# Patient Record
Sex: FEMALE | Race: ASIAN | NOT HISPANIC OR LATINO | Employment: FULL TIME | URBAN - METROPOLITAN AREA
[De-identification: names, ages, dates, MRNs, and addresses within clinical notes are randomized per-mention and may not be internally consistent; named-entity substitution may affect disease eponyms.]

---

## 2017-03-24 ENCOUNTER — HOSPITAL ENCOUNTER (OUTPATIENT)
Facility: HOSPITAL | Age: 62
Setting detail: HOSPITAL OUTPATIENT SURGERY
Discharge: HOME | End: 2017-03-24
Attending: INTERNAL MEDICINE | Admitting: INTERNAL MEDICINE

## 2017-03-24 ENCOUNTER — ANESTHESIA EVENT (OUTPATIENT)
Dept: GASTROENTEROLOGY | Facility: HOSPITAL | Age: 62
End: 2017-03-24

## 2017-03-24 ENCOUNTER — ON CAMPUS - OUTPATIENT (OUTPATIENT)
Dept: URBAN - METROPOLITAN AREA HOSPITAL 114 | Facility: HOSPITAL | Age: 62
End: 2017-03-24
Payer: COMMERCIAL

## 2017-03-24 ENCOUNTER — ANESTHESIA (OUTPATIENT)
Dept: GASTROENTEROLOGY | Facility: HOSPITAL | Age: 62
End: 2017-03-24

## 2017-03-24 VITALS
DIASTOLIC BLOOD PRESSURE: 83 MMHG | SYSTOLIC BLOOD PRESSURE: 121 MMHG | TEMPERATURE: 98.2 F | HEIGHT: 64 IN | HEART RATE: 85 BPM | WEIGHT: 133.44 LBS | RESPIRATION RATE: 12 BRPM | OXYGEN SATURATION: 100 % | BODY MASS INDEX: 22.78 KG/M2

## 2017-03-24 DIAGNOSIS — K21.9 GERD (GASTROESOPHAGEAL REFLUX DISEASE): ICD-10-CM

## 2017-03-24 DIAGNOSIS — K64.0 FIRST DEGREE HEMORRHOIDS: ICD-10-CM

## 2017-03-24 DIAGNOSIS — Z86.010 PERSONAL HISTORY OF COLONIC POLYPS: ICD-10-CM

## 2017-03-24 DIAGNOSIS — K31.7 POLYP OF STOMACH AND DUODENUM: ICD-10-CM

## 2017-03-24 DIAGNOSIS — Z98.890 OTHER SPECIFIED POSTPROCEDURAL STATES: ICD-10-CM

## 2017-03-24 DIAGNOSIS — K29.50 UNSPECIFIED CHRONIC GASTRITIS WITHOUT BLEEDING: ICD-10-CM

## 2017-03-24 PROCEDURE — 45378 DIAGNOSTIC COLONOSCOPY: CPT | Mod: 33 | Performed by: INTERNAL MEDICINE

## 2017-03-24 PROCEDURE — 43239 EGD BIOPSY SINGLE/MULTIPLE: CPT | Performed by: INTERNAL MEDICINE

## 2017-03-24 PROCEDURE — 25010000002 PROPOFOL 10 MG/ML EMULSION: Performed by: ANESTHESIOLOGY

## 2017-03-24 PROCEDURE — 88312 SPECIAL STAINS GROUP 1: CPT | Performed by: INTERNAL MEDICINE

## 2017-03-24 PROCEDURE — 88305 TISSUE EXAM BY PATHOLOGIST: CPT | Performed by: INTERNAL MEDICINE

## 2017-03-24 RX ORDER — SODIUM CHLORIDE, SODIUM LACTATE, POTASSIUM CHLORIDE, CALCIUM CHLORIDE 600; 310; 30; 20 MG/100ML; MG/100ML; MG/100ML; MG/100ML
30 INJECTION, SOLUTION INTRAVENOUS CONTINUOUS PRN
Status: DISCONTINUED | OUTPATIENT
Start: 2017-03-24 | End: 2017-03-24 | Stop reason: HOSPADM

## 2017-03-24 RX ORDER — ASPIRIN 81 MG/1
81 TABLET ORAL DAILY
COMMUNITY
End: 2018-08-02 | Stop reason: HOSPADM

## 2017-03-24 RX ORDER — OMEPRAZOLE/SODIUM BICARBONATE 40; 1680 MG/1; MG/1
1 POWDER, FOR SUSPENSION ORAL
COMMUNITY
End: 2018-07-20

## 2017-03-24 RX ORDER — LIDOCAINE HYDROCHLORIDE 20 MG/ML
INJECTION, SOLUTION INFILTRATION; PERINEURAL AS NEEDED
Status: DISCONTINUED | OUTPATIENT
Start: 2017-03-24 | End: 2017-03-24 | Stop reason: SURG

## 2017-03-24 RX ORDER — PROPOFOL 10 MG/ML
VIAL (ML) INTRAVENOUS CONTINUOUS PRN
Status: DISCONTINUED | OUTPATIENT
Start: 2017-03-24 | End: 2017-03-24 | Stop reason: SURG

## 2017-03-24 RX ORDER — PROPOFOL 10 MG/ML
VIAL (ML) INTRAVENOUS AS NEEDED
Status: DISCONTINUED | OUTPATIENT
Start: 2017-03-24 | End: 2017-03-24 | Stop reason: SURG

## 2017-03-24 RX ORDER — ESTRADIOL 0.5 MG/1
0.5 TABLET ORAL DAILY
COMMUNITY
End: 2018-07-20

## 2017-03-24 RX ORDER — FEXOFENADINE HCL AND PSEUDOEPHEDRINE HCI 180; 240 MG/1; MG/1
1 TABLET, EXTENDED RELEASE ORAL DAILY
COMMUNITY
End: 2021-03-26 | Stop reason: ALTCHOICE

## 2017-03-24 RX ADMIN — SODIUM CHLORIDE, POTASSIUM CHLORIDE, SODIUM LACTATE AND CALCIUM CHLORIDE 30 ML/HR: 600; 310; 30; 20 INJECTION, SOLUTION INTRAVENOUS at 08:02

## 2017-03-24 RX ADMIN — PROPOFOL 50 MG: 10 INJECTION, EMULSION INTRAVENOUS at 08:46

## 2017-03-24 RX ADMIN — LIDOCAINE HYDROCHLORIDE 40 MG: 20 INJECTION, SOLUTION INFILTRATION; PERINEURAL at 08:41

## 2017-03-24 RX ADMIN — PROPOFOL 150 MG: 10 INJECTION, EMULSION INTRAVENOUS at 08:41

## 2017-03-24 RX ADMIN — PROPOFOL 50 MG: 10 INJECTION, EMULSION INTRAVENOUS at 09:00

## 2017-03-24 RX ADMIN — PROPOFOL 300 MCG/KG/MIN: 10 INJECTION, EMULSION INTRAVENOUS at 08:42

## 2017-03-24 RX ADMIN — SODIUM CHLORIDE, POTASSIUM CHLORIDE, SODIUM LACTATE AND CALCIUM CHLORIDE: 600; 310; 30; 20 INJECTION, SOLUTION INTRAVENOUS at 08:37

## 2017-03-24 NOTE — ANESTHESIA POSTPROCEDURE EVALUATION
Patient: Carly Mendoza    Procedure Summary     Date Anesthesia Start Anesthesia Stop Room / Location    03/24/17 0837 0911  [facility] /  [facility]       Procedure Diagnosis Surgeon Provider    COLONOSCOPY TO CECUM AND TI (N/A ); ESOPHAGOGASTRODUODENOSCOPY (N/A Esophagus) No diagnosis on file. MD Floyd Williamson MD          Anesthesia Type: MAC  Last vitals  /79 (03/24/17 0922)    Temp      Pulse 80 (03/24/17 0922)   Resp 12 (03/24/17 0922)    SpO2 100 % (03/24/17 0922)      Post Anesthesia Care and Evaluation    Patient location during evaluation: bedside  Patient participation: complete - patient participated  Level of consciousness: awake and alert  Pain management: adequate  Airway patency: patent  Anesthetic complications: No anesthetic complications    Cardiovascular status: acceptable  Respiratory status: acceptable  Hydration status: acceptable

## 2017-03-24 NOTE — ANESTHESIA PREPROCEDURE EVALUATION
Anesthesia Evaluation     Patient summary reviewed and Nursing notes reviewed      Airway   Mallampati: II  TM distance: >3 FB  Neck ROM: full  no difficulty expected  Dental - normal exam     Pulmonary - negative pulmonary ROS and normal exam   Cardiovascular - negative cardio ROS and normal exam        Neuro/Psych- negative ROS  GI/Hepatic/Renal/Endo    (+)  GERD,     Musculoskeletal (-) negative ROS    Abdominal  - normal exam   Substance History - negative use     OB/GYN negative ob/gyn ROS         Other                                    Anesthesia Plan    ASA 2     MAC     intravenous induction   Anesthetic plan and risks discussed with patient.

## 2017-03-24 NOTE — H&P
Patient Care Team:  No Known Provider as PCP - General    Chief complaint gerd, hx of polyps    Subjective     History of Present Illness  Hx of gerd, requiring zegerid despite fundoplication   Review of Systems   All other systems reviewed and are negative.       Past Medical History:   Diagnosis Date   • Environmental allergies    • GERD (gastroesophageal reflux disease)      Past Surgical History:   Procedure Laterality Date   • HYSTERECTOMY       Family History   Problem Relation Age of Onset   • Colon cancer Mother      Social History   Substance Use Topics   • Smoking status: Never Smoker   • Smokeless tobacco: None   • Alcohol use Yes      Comment: social     Prescriptions Prior to Admission   Medication Sig Dispense Refill Last Dose   • aspirin 81 MG EC tablet Take 81 mg by mouth Daily.   3/17/2017   • estradiol (ESTRACE) 0.5 MG tablet Take 0.5 mg by mouth Daily.   3/23/2017 at Unknown time   • fexofenadine-pseudoephedrine (ALLEGRA-D 24) 180-240 MG per 24 hr tablet Take 1 tablet by mouth Daily.   3/24/2017 at Unknown time   • omeprazole-sodium bicarbonate (ZEGERID)  MG pack packet Take  by mouth Every Morning Before Breakfast.   Past Week at Unknown time     Allergies:  Latex and Moxifloxacin    Objective      Vital Signs  Temp:  [98.2 °F (36.8 °C)] 98.2 °F (36.8 °C)  Heart Rate:  [86] 86  Resp:  [20] 20  BP: (117)/(82) 117/82    Physical Exam   Constitutional: She is oriented to person, place, and time. She appears well-developed and well-nourished.   HENT:   Mouth/Throat: Oropharynx is clear and moist.   Eyes: Conjunctivae are normal.   Neck: Neck supple.   Cardiovascular: Normal rate and regular rhythm.    Pulmonary/Chest: Effort normal and breath sounds normal.   Abdominal: Soft. Bowel sounds are normal.   Neurological: She is alert and oriented to person, place, and time.   Skin: Skin is warm and dry.   Psychiatric: She has a normal mood and affect.       Results Review:   None       Assessment/Plan     Active Problems:    * No active hospital problems. *      Assessment:  (Gerd  Hx of polyps).     Plan:   (Upper and lower tract endoscopy, risks, alternatives and benefits dicussed  with patient and patient is agreeable to proceed.).       I discussed the patients findings and my recommendations with patient and nursing staff    Aleksander Bella MD  03/24/17  8:43 AM

## 2017-03-24 NOTE — PLAN OF CARE
Problem: Patient Care Overview (Adult)  Goal: Plan of Care Review  Outcome: Ongoing (interventions implemented as appropriate)    03/24/17 0736   Coping/Psychosocial Response Interventions   Plan Of Care Reviewed With patient       Goal: Adult Individualization and Mutuality  Outcome: Ongoing (interventions implemented as appropriate)    03/24/17 0736   Individualization   Patient Specific Preferences none       Goal: Discharge Needs Assessment  Outcome: Ongoing (interventions implemented as appropriate)    03/24/17 0736   Discharge Needs Assessment   Concerns To Be Addressed no discharge needs identified   Living Environment   Transportation Available family or friend will provide         Problem: GI Endoscopy (Adult)  Goal: Signs and Symptoms of Listed Potential Problems Will be Absent or Manageable (GI Endoscopy)  Outcome: Ongoing (interventions implemented as appropriate)    03/24/17 0736   GI Endoscopy   Problems Assessed (GI Endoscopy) pain   Problems Present (GI Endoscopy) none

## 2017-03-27 LAB
CYTO UR: NORMAL
LAB AP CASE REPORT: NORMAL
Lab: NORMAL
PATH REPORT.FINAL DX SPEC: NORMAL
PATH REPORT.GROSS SPEC: NORMAL

## 2018-05-30 ENCOUNTER — TRANSCRIBE ORDERS (OUTPATIENT)
Dept: ADMINISTRATIVE | Facility: HOSPITAL | Age: 63
End: 2018-05-30

## 2018-05-30 DIAGNOSIS — N63.10 BREAST MASS, RIGHT: Primary | ICD-10-CM

## 2018-06-01 ENCOUNTER — HOSPITAL ENCOUNTER (OUTPATIENT)
Dept: MAMMOGRAPHY | Facility: HOSPITAL | Age: 63
Discharge: HOME | End: 2018-06-01
Attending: OBSTETRICS & GYNECOLOGY | Admitting: OBSTETRICS & GYNECOLOGY

## 2018-06-01 ENCOUNTER — HOSPITAL ENCOUNTER (OUTPATIENT)
Dept: ULTRASOUND IMAGING | Facility: HOSPITAL | Age: 63
Discharge: HOME | End: 2018-06-01

## 2018-06-01 DIAGNOSIS — N63.10 BREAST MASS, RIGHT: ICD-10-CM

## 2018-06-01 PROCEDURE — 77066 DX MAMMO INCL CAD BI: CPT

## 2018-06-01 PROCEDURE — 76642 ULTRASOUND BREAST LIMITED: CPT

## 2018-06-15 ENCOUNTER — HOSPITAL ENCOUNTER (OUTPATIENT)
Dept: MAMMOGRAPHY | Facility: HOSPITAL | Age: 63
Discharge: HOME | End: 2018-06-15

## 2018-06-15 ENCOUNTER — HOSPITAL ENCOUNTER (OUTPATIENT)
Dept: ULTRASOUND IMAGING | Facility: HOSPITAL | Age: 63
Discharge: HOME | End: 2018-06-15
Attending: OBSTETRICS & GYNECOLOGY | Admitting: OBSTETRICS & GYNECOLOGY

## 2018-06-15 VITALS
HEART RATE: 88 BPM | BODY MASS INDEX: 23.9 KG/M2 | WEIGHT: 140 LBS | SYSTOLIC BLOOD PRESSURE: 124 MMHG | HEIGHT: 64 IN | RESPIRATION RATE: 18 BRPM | TEMPERATURE: 97.2 F | DIASTOLIC BLOOD PRESSURE: 84 MMHG

## 2018-06-15 DIAGNOSIS — IMO0002 MASS: ICD-10-CM

## 2018-06-15 PROCEDURE — 77065 DX MAMMO INCL CAD UNI: CPT

## 2018-06-15 PROCEDURE — A4648 IMPLANTABLE TISSUE MARKER: HCPCS

## 2018-06-15 PROCEDURE — 88341 IMHCHEM/IMCYTCHM EA ADD ANTB: CPT | Performed by: OBSTETRICS & GYNECOLOGY

## 2018-06-15 PROCEDURE — 88342 IMHCHEM/IMCYTCHM 1ST ANTB: CPT | Performed by: OBSTETRICS & GYNECOLOGY

## 2018-06-15 PROCEDURE — 88305 TISSUE EXAM BY PATHOLOGIST: CPT | Performed by: OBSTETRICS & GYNECOLOGY

## 2018-06-15 PROCEDURE — 88360 TUMOR IMMUNOHISTOCHEM/MANUAL: CPT | Performed by: OBSTETRICS & GYNECOLOGY

## 2018-06-15 RX ORDER — LIDOCAINE HYDROCHLORIDE 10 MG/ML
20 INJECTION, SOLUTION INFILTRATION; PERINEURAL ONCE
Status: COMPLETED | OUTPATIENT
Start: 2018-06-15 | End: 2018-06-15

## 2018-06-15 RX ADMIN — LIDOCAINE HYDROCHLORIDE 10 ML: 10 INJECTION, SOLUTION INFILTRATION; PERINEURAL at 11:35

## 2018-06-15 RX ADMIN — LIDOCAINE HYDROCHLORIDE 17 ML: 10 INJECTION, SOLUTION INFILTRATION; PERINEURAL at 10:42

## 2018-06-15 NOTE — NURSING NOTE
Patient with biopsy x2 to right breast. Firm pressure was help x20 minutes by two ultrasound techs. Hematoma 2.5 inches x 2.5 inches present at site when this nurse was called to ultrasound. Discussed with Dr. Arora that site still oozing. Ten minutes of additional pressure help to site and then applied D-stat per Dr. Arora's order. Hematoma decreased in size to 1.5 x 1.5 inches after additional pressure. Dr. Arora then proceeded on to biopsy additional site in right breast. No issues with bleeding with second site. D-stat remains in place to first biopsy site with skin affix dry and intact to more lateral second site. More medial site did have some dark pink bruising right at needle entry site. No bruising noted at other site. Post mammo. Images completed. Ice pack with protective covering applied over both sites. Patient reported she does find the area tender but not painful.  Explained to patient that I needed to apply a wide Ace pressure wrap in lieu of bra since she has a hematoma. 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 Discharge instructions discussed with understanding voiced by patient. Copies provided to patient. No distress noted. To home via private vehicle accompanied by her .

## 2018-06-25 LAB
CYTO UR: NORMAL
DX PRELIMINARY: NORMAL
LAB AP CASE REPORT: NORMAL
LAB AP CLINICAL INFORMATION: NORMAL
LAB AP INTRADEPARTMENTAL CONSULT: NORMAL
LAB AP SPECIAL STAINS: NORMAL
LAB AP SYNOPTIC CHECKLIST: NORMAL
PATH REPORT.ADDENDUM SPEC: NORMAL
PATH REPORT.FINAL DX SPEC: NORMAL
PATH REPORT.GROSS SPEC: NORMAL

## 2018-06-28 ENCOUNTER — OFFICE VISIT (OUTPATIENT)
Dept: MAMMOGRAPHY | Facility: CLINIC | Age: 63
End: 2018-06-28

## 2018-06-28 ENCOUNTER — TELEPHONE (OUTPATIENT)
Dept: MAMMOGRAPHY | Facility: CLINIC | Age: 63
End: 2018-06-28

## 2018-06-28 ENCOUNTER — DOCUMENTATION (OUTPATIENT)
Dept: OTHER | Facility: HOSPITAL | Age: 63
End: 2018-06-28

## 2018-06-28 VITALS
OXYGEN SATURATION: 95 % | HEIGHT: 64 IN | DIASTOLIC BLOOD PRESSURE: 75 MMHG | HEART RATE: 107 BPM | WEIGHT: 144 LBS | SYSTOLIC BLOOD PRESSURE: 118 MMHG | TEMPERATURE: 97.6 F | BODY MASS INDEX: 24.59 KG/M2

## 2018-06-28 DIAGNOSIS — C50.911 INVASIVE DUCTAL CARCINOMA OF RIGHT BREAST (HCC): Primary | ICD-10-CM

## 2018-06-28 DIAGNOSIS — Z17.0 MALIGNANT NEOPLASM OF OVERLAPPING SITES OF RIGHT BREAST IN FEMALE, ESTROGEN RECEPTOR POSITIVE (HCC): Primary | ICD-10-CM

## 2018-06-28 DIAGNOSIS — C50.811 MALIGNANT NEOPLASM OF OVERLAPPING SITES OF RIGHT BREAST IN FEMALE, ESTROGEN RECEPTOR POSITIVE (HCC): Primary | ICD-10-CM

## 2018-06-28 PROCEDURE — 99205 OFFICE O/P NEW HI 60 MIN: CPT | Performed by: SURGERY

## 2018-06-28 RX ORDER — TURMERIC ROOT EXTRACT 500 MG
1000 TABLET ORAL DAILY
COMMUNITY

## 2018-06-28 RX ORDER — CHLORTHALIDONE 25 MG/1
25 TABLET ORAL NIGHTLY
COMMUNITY
Start: 2018-06-05 | End: 2020-10-02 | Stop reason: SDUPTHER

## 2018-06-28 NOTE — PROGRESS NOTES
Met patient today with Dr. Gudino. He discussed genetic counseling/possible testing with patient and she is interested. I offered her a spot in our Tuesday morning clinic next week, but Tuesday's don't work with her schedule. We discussed that she could be seen at the [facility] and she prefers that and would like to see Dr. Wahl. Referral placed.

## 2018-06-28 NOTE — PROGRESS NOTES
Chief Complaint: Carly Mendoza is a 62 y.o.. female here today for Breast Cancer (right breast)        History of Present Illness:  Patient presents with newly diagnosed breast cancer.  The patient has never had a mammogram until her most recent one.  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  She felt a lump and promptly had mammograms and an ultrasound.  She was noted to have a 4.2 cm irregular mass in the 9:30 position of the right breast.  There were also some pleomorphic calcifications in the lower inner quadrant of the right breast.  The radiologist felt that there was an ultrasound correlate and recommended biopsy of this area as well.  The pathology reveals a grade 1 invasive ductal cancer at the 9:30 position.  There are also fragments of invasive ductal cancer at the 4 to 5 o'clock position.  Note was made that the cancer is probably in the lymphatic spaces.  The tumor is ER positive, WV positive, and HER-2 negative.  The patient underwent a total abdominal hysterectomy and BSO at age 33 and has been on hormone replacement therapy for the last 30 years.  She recently stopped taking them.  Her family history is significant for a mother with colon cancer, maternal aunt with breast cancer and a maternal aunt with uterine cancer.  The patient has never had any prior breast biopsies.    She denies any musculoskeletal pain, headaches, or balance problems.  Review of Systems:  Review of Systems   Constitutional: Positive for fatigue.   Endocrine: Positive for hot flashes.   Skin:        The patient denies any noticeable changes to the skin of the breast.    All other systems reviewed and are negative.       Past Medical and Surgical History:  Breast Biopsy History:  Patient has had the following breast biopsies:right breast 2018- malignant   Breast Cancer HIstory:  Patient does not have a past medical history of breast cancer.  Breast Operations, and  year:  none  History   Smoking Status   • Never Smoker   Smokeless Tobacco   • Never Used     Obstetric History:  Patient is postmenopausal due to removal of her uterus in the following year:1988   Number of pregnancies:0  Number of live births: 0  Number of abortions or miscarriages: 0  Length of time taking birth control pills:0  Patient is presently taking the following hormone replacement:Estradiol for past 30 years    Past Surgical History:   Procedure Laterality Date   • BREAST BIOPSY Right 2018    Malignant   • COLONOSCOPY N/A 3/24/2017    Procedure: COLONOSCOPY TO CECUM AND TI;  Surgeon: Aleksander Bella MD;  Location: [facility] ENDOSCOPY;  Service:    • ENDOSCOPY N/A 3/24/2017    Procedure: ESOPHAGOGASTRODUODENOSCOPY;  Surgeon: Aleksander Bella MD;  Location: [facility] ENDOSCOPY;  Service:    • HYSTERECTOMY     • OOPHORECTOMY         Past Medical History:   Diagnosis Date   • Environmental allergies    • GERD (gastroesophageal reflux disease)    • Hypertension        Prior Hospitalizations, other than for surgery or childbirth, and year:  none    Social History:  Patient is .  Patient has no children.    Family History:  Family History   Problem Relation Age of Onset   • Colon cancer Mother 73   • Breast cancer Maternal Aunt 60   • Uterine cancer Maternal Aunt 75       Vital Signs:  Vitals:    06/28/18 1406   BP: 118/75   Pulse: 107   Temp: 97.6 °F (36.4 °C)   SpO2: 95%       Medications:    Current Outpatient Prescriptions:     Current Outpatient Prescriptions:   •  aspirin 81 MG EC tablet, Take 81 mg by mouth Daily., Disp: , Rfl:   •  chlorthalidone (HYGROTON) 25 MG tablet, , Disp: , Rfl:   •  fexofenadine-pseudoephedrine (ALLEGRA-D 24) 180-240 MG per 24 hr tablet, Take 1 tablet by mouth Daily., Disp: , Rfl:   •  omeprazole-sodium bicarbonate (ZEGERID)  MG pack packet, Take  by mouth Every Morning Before Breakfast., Disp: , Rfl:   •  Turmeric 500 MG tablet, Take 1,000 mg by mouth., Disp: , Rfl:   •   estradiol (ESTRACE) 0.5 MG tablet, Take 0.5 mg by mouth Daily., Disp: , Rfl:     Physical Examination:  General Appearance:   Patient is in no distress.  She is well kept and has an average build.   Psychiatric:  Patient with appropriate mood and affect. Alert and oriented to self, time, and place.    Breast, RIGHT:  small sized, symmetric with the contralateral side.  there is an obvious palpable mass in the upper outer quadrant extending to just under the edge of the areola.  She has a large hematoma in the lower inner quadrant making evaluation difficult in this area.  I do not detect any nipple discharge or see any significant skin dimpling or retraction..    Breast, LEFT:  small sized, symmetric with the contralateral side.  Breast skin is without erythema, edema, rashes.  There are no visible abnormalities upon inspection during the arm-raising maneuver or with hands on hips in the sitting position. There is no nipple retraction, discharge or nipple/areolar skin changes.There are no masses palpable in the sitting or supine positions.    Lymphatic:  There is no axillary, cervical, infraclavicular, or supraclavicular adenopathy bilaterally.  Eyes:  Pupils are round and reactive to light.  Cardiovascular:  Heart rate and rhythm are regular.  Respiratory:  Lungs are clear bilaterally with no crackles or wheezes in any lung field.  Gastrointestinal:  Abdomen is soft, nondistended, and nontender.  I do not detect any abdominal masses or see evidence of hepatosplenomegaly.    Musculoskeletal:  Good strength in all 4 extremities.   There is good range of motion in both shoulders.    Skin:  No new skin lesions or rashes on the skin excluding the breast (see breast exam above).    Assessment:  1. Malignant neoplasm of overlapping sites of right breast in female, estrogen receptor positive          Plan:  The patient came by herself today.  The face-to-face office visit lasted 50 minutes.  We began the conversation  discussing her pathology report.  We talked about the origin of most of breast cancers from either the ducts or the lobules.  The difference between invasive and in situ disease was explained and the visual was drawn for her.  When invasion has occurred, the lymph nodes need to be evaluated.  When there is in situ disease the lymph nodes should be considered if the area of concern is widespread or it is high-grade.  We also discussed the significance of hormone receptors.  They often can give us some idea how the breast cancer will behave and potentially lead us to offer neoadjuvant chemotherapy.    Next we discussed the surgical options which include breast conserving therapy versus a mastectomy.  With breast conserving therapy we are talking about a lumpectomy with margins, lymph node evaluation, and radiation treatment.  The radiation treatment is generally given to the entire breast for 6 weeks.  The side effects consist of local skin change and potential injury to nearby structures such as the lung or the heart.  A mastectomy would involve removing the breast tissues with preservation of the pectoral muscles and evaluation of the lymph nodes if indicated.  The potential for reconstruction by either an implant or autologous tissue was discussed.  This could be performed on a delayed basis or immediately depending on a multitude of factors.  The survival rates for these 2 procedures are equivalent but there are incidences where one may be favored over the other.  There are times when a lumpectomy is not possible due to the large tumor to breast ratio or the location of the tumor.  Previous radiation to the chest wall or collagen disorders such as scleroderma would make radiation treatment and possible and therefore exclude breast conserving therapy as an option.    I do think an MRI would be helpful in her case.  She would also appeared to be a candidate for genetic testing which could potentially affect surgical  decision making.  The patient is very anxious to have surgery as she feels that this is potentially spreading.  She is not a candidate for lumpectomy and is quite comfortable with a mastectomy.  She would like to consider reconstruction and she has an appointment to see Dr. Waterhouse tomorrow.  I will call her back after the MRI.  The patient would probably prefer to have surgery rather than waiting on genetic testing and we will just see how all of that plays out.  CPT coding:    Next Appointment:  No Follow-up on file.            EMR Dragon/transcription disclaimer:    Much of this encounter note is an electronic transcription/translocation of spoken language to printed text.  The electronic translation of spoken language may permit erroneous, or at times, nonsensical words or phrases to be inadvertently transcribed.  Although I have reviewed the note from such areas, some may still exist.

## 2018-07-07 ENCOUNTER — HOSPITAL ENCOUNTER (OUTPATIENT)
Dept: MRI IMAGING | Facility: HOSPITAL | Age: 63
Discharge: HOME | End: 2018-07-07
Attending: SURGERY | Admitting: SURGERY

## 2018-07-07 DIAGNOSIS — C50.811 MALIGNANT NEOPLASM OF OVERLAPPING SITES OF RIGHT BREAST IN FEMALE, ESTROGEN RECEPTOR POSITIVE (HCC): ICD-10-CM

## 2018-07-07 DIAGNOSIS — Z17.0 MALIGNANT NEOPLASM OF OVERLAPPING SITES OF RIGHT BREAST IN FEMALE, ESTROGEN RECEPTOR POSITIVE (HCC): ICD-10-CM

## 2018-07-07 PROCEDURE — A9577 INJ MULTIHANCE: HCPCS | Performed by: SURGERY

## 2018-07-07 PROCEDURE — 0 GADOBENATE DIMEGLUMINE 529 MG/ML SOLUTION: Performed by: SURGERY

## 2018-07-07 PROCEDURE — 0159T HC MRI BREAST COMPUTER ANALYSIS: CPT

## 2018-07-07 PROCEDURE — 82565 ASSAY OF CREATININE: CPT

## 2018-07-07 PROCEDURE — C8908 MRI W/O FOL W/CONT, BREAST,: HCPCS

## 2018-07-07 RX ADMIN — GADOBENATE DIMEGLUMINE 13 ML: 529 INJECTION, SOLUTION INTRAVENOUS at 13:33

## 2018-07-08 LAB — CREAT BLDA-MCNC: 0.9 MG/DL (ref 0.6–1.3)

## 2018-07-10 ENCOUNTER — TELEPHONE (OUTPATIENT)
Dept: OTHER | Facility: HOSPITAL | Age: 63
End: 2018-07-10

## 2018-07-10 NOTE — TELEPHONE ENCOUNTER
I spoke with Carly today to see if she has had her appointment yet at the [facility]. She states she has not. I spoke with Bridgette at the [facility] and she will be calling the patient to schedule the appointment.

## 2018-07-11 ENCOUNTER — DOCUMENTATION (OUTPATIENT)
Dept: OTHER | Facility: HOSPITAL | Age: 63
End: 2018-07-11

## 2018-07-11 NOTE — PROGRESS NOTES
I received a message from Bridgette at the [facility] stating patient will be seen on 7-13-18 at 1:00.

## 2018-07-16 ENCOUNTER — TELEPHONE (OUTPATIENT)
Dept: MAMMOGRAPHY | Facility: CLINIC | Age: 63
End: 2018-07-16

## 2018-07-17 ENCOUNTER — TELEPHONE (OUTPATIENT)
Dept: MAMMOGRAPHY | Facility: CLINIC | Age: 63
End: 2018-07-17

## 2018-07-17 ENCOUNTER — PREP FOR SURGERY (OUTPATIENT)
Dept: OTHER | Facility: HOSPITAL | Age: 63
End: 2018-07-17

## 2018-07-17 DIAGNOSIS — Z17.0 MALIGNANT NEOPLASM OF OVERLAPPING SITES OF RIGHT BREAST IN FEMALE, ESTROGEN RECEPTOR POSITIVE (HCC): Primary | ICD-10-CM

## 2018-07-17 DIAGNOSIS — C50.811 MALIGNANT NEOPLASM OF OVERLAPPING SITES OF RIGHT BREAST IN FEMALE, ESTROGEN RECEPTOR POSITIVE (HCC): Primary | ICD-10-CM

## 2018-07-17 RX ORDER — LIDOCAINE AND PRILOCAINE 25; 25 MG/G; MG/G
CREAM TOPICAL ONCE
Status: CANCELLED | OUTPATIENT
Start: 2018-08-01 | End: 2018-08-01

## 2018-07-17 RX ORDER — DIAZEPAM 5 MG/1
10 TABLET ORAL ONCE
Status: CANCELLED | OUTPATIENT
Start: 2018-08-01 | End: 2018-08-01

## 2018-07-17 RX ORDER — ACETAMINOPHEN 500 MG
1000 TABLET ORAL ONCE
Status: CANCELLED | OUTPATIENT
Start: 2018-08-01 | End: 2018-08-01

## 2018-07-17 RX ORDER — CEFAZOLIN SODIUM 2 G/100ML
2 INJECTION, SOLUTION INTRAVENOUS ONCE
Status: CANCELLED | OUTPATIENT
Start: 2018-08-01 | End: 2018-08-01

## 2018-07-17 RX ORDER — CELECOXIB 200 MG/1
400 CAPSULE ORAL ONCE
Status: CANCELLED | OUTPATIENT
Start: 2018-08-01 | End: 2018-08-01

## 2018-07-17 NOTE — TELEPHONE ENCOUNTER
I told her the MRI shows extensive enhancement of almost the entire upper half of her breast.  She has some borderline enlarged lymph nodes but they all have normal architecture.  The other breast looks fine.  The patient however prefers bilateral mastectomies and I think that is reasonable considering the extent of her disease and difficulty in picking this up of the small size.

## 2018-07-18 DIAGNOSIS — C50.811 MALIGNANT NEOPLASM OF OVERLAPPING SITES OF RIGHT BREAST IN FEMALE, ESTROGEN RECEPTOR POSITIVE (HCC): Primary | ICD-10-CM

## 2018-07-18 DIAGNOSIS — Z17.0 MALIGNANT NEOPLASM OF OVERLAPPING SITES OF RIGHT BREAST IN FEMALE, ESTROGEN RECEPTOR POSITIVE (HCC): Primary | ICD-10-CM

## 2018-07-20 ENCOUNTER — APPOINTMENT (OUTPATIENT)
Dept: PREADMISSION TESTING | Facility: HOSPITAL | Age: 63
End: 2018-07-20

## 2018-07-20 VITALS
WEIGHT: 144.3 LBS | HEART RATE: 95 BPM | TEMPERATURE: 98.1 F | DIASTOLIC BLOOD PRESSURE: 84 MMHG | SYSTOLIC BLOOD PRESSURE: 120 MMHG | OXYGEN SATURATION: 98 % | BODY MASS INDEX: 24.63 KG/M2 | HEIGHT: 64 IN | RESPIRATION RATE: 16 BRPM

## 2018-07-20 LAB
ANION GAP SERPL CALCULATED.3IONS-SCNC: 15.7 MMOL/L
BUN BLD-MCNC: 20 MG/DL (ref 8–23)
BUN/CREAT SERPL: 19 (ref 7–25)
CALCIUM SPEC-SCNC: 9.4 MG/DL (ref 8.6–10.5)
CHLORIDE SERPL-SCNC: 96 MMOL/L (ref 98–107)
CO2 SERPL-SCNC: 29.3 MMOL/L (ref 22–29)
CREAT BLD-MCNC: 1.05 MG/DL (ref 0.57–1)
DEPRECATED RDW RBC AUTO: 43.8 FL (ref 37–54)
ERYTHROCYTE [DISTWIDTH] IN BLOOD BY AUTOMATED COUNT: 14.8 % (ref 11.7–13)
GFR SERPL CREATININE-BSD FRML MDRD: 53 ML/MIN/1.73
GFR SERPL CREATININE-BSD FRML MDRD: 65 ML/MIN/1.73
GLUCOSE BLD-MCNC: 90 MG/DL (ref 65–99)
HCT VFR BLD AUTO: 39.8 % (ref 35.6–45.5)
HGB BLD-MCNC: 12.5 G/DL (ref 11.9–15.5)
MCH RBC QN AUTO: 25.6 PG (ref 26.9–32)
MCHC RBC AUTO-ENTMCNC: 31.4 G/DL (ref 32.4–36.3)
MCV RBC AUTO: 81.6 FL (ref 80.5–98.2)
PLATELET # BLD AUTO: 365 10*3/MM3 (ref 140–500)
PMV BLD AUTO: 9.3 FL (ref 6–12)
POTASSIUM BLD-SCNC: 3.7 MMOL/L (ref 3.5–5.2)
RBC # BLD AUTO: 4.88 10*6/MM3 (ref 3.9–5.2)
SODIUM BLD-SCNC: 141 MMOL/L (ref 136–145)
WBC NRBC COR # BLD: 8.03 10*3/MM3 (ref 4.5–10.7)

## 2018-07-20 PROCEDURE — 80048 BASIC METABOLIC PNL TOTAL CA: CPT | Performed by: SURGERY

## 2018-07-20 PROCEDURE — 93010 ELECTROCARDIOGRAM REPORT: CPT | Performed by: INTERNAL MEDICINE

## 2018-07-20 PROCEDURE — 85027 COMPLETE CBC AUTOMATED: CPT | Performed by: SURGERY

## 2018-07-20 PROCEDURE — 36415 COLL VENOUS BLD VENIPUNCTURE: CPT

## 2018-07-20 PROCEDURE — 93005 ELECTROCARDIOGRAM TRACING: CPT

## 2018-07-20 RX ORDER — OMEPRAZOLE AND SODIUM BICARBONATE 40; 1100 MG/1; MG/1
1 CAPSULE ORAL NIGHTLY
COMMUNITY

## 2018-07-20 NOTE — DISCHARGE INSTRUCTIONS
Take the following medications the morning of surgery with a small sip of water:    ZEGERID    General Instructions:  • Do not eat solid food after midnight the night before surgery.  • You may drink clear liquids day of surgery but must stop at least one hour before your hospital arrival time.  • It is beneficial for you to have a clear drink that contains carbohydrates the day of surgery.  We suggest a 12 to 20 ounce bottle of Gatorade or Powerade for non-diabetic patients or a 12 to 20 ounce bottle of G2 or Powerade Zero for diabetic patients. (Pediatric patients, are not advised to drink a 12 to 20 ounce carbohydrate drink)    Clear liquids are liquids you can see through.  Nothing red in color.     Plain water                               Sports drinks  Sodas                                   Gelatin (Jell-O)  Fruit juices without pulp such as white grape juice and apple juice  Popsicles that contain no fruit or yogurt  Tea or coffee (no cream or milk added)  Gatorade / Powerade  G2 / Powerade Zero    • Infants may have breast milk up to four hours before surgery.  • Infants drinking formula may drink formula up to six hours before surgery.   • Patients who avoid smoking, chewing tobacco and alcohol for 4 weeks prior to surgery have a reduced risk of post-operative complications.  Quit smoking as many days before surgery as you can.  • Do not smoke, use chewing tobacco or drink alcohol the day of surgery.   • If applicable bring your C-PAP/ BI-PAP machine.  • Bring any papers given to you in the doctor’s office.  • Wear clean comfortable clothes and socks.  • Do not wear contact lenses or make-up.  Bring a case for your glasses.   • Bring crutches or walker if applicable.  • Remove all piercings.  Leave jewelry and any other valuables at home.  • Hair extensions with metal clips must be removed prior to surgery.  • The Pre-Admission Testing nurse will instruct you to bring medications if unable to obtain an  accurate list in Pre-Admission Testing.        If you were given a blood bank ID arm band remember to bring it with you the day of surgery.    Preventing a Surgical Site Infection:  • For 2 to 3 days before surgery, avoid shaving with a razor because the razor can irritate skin and make it easier to develop an infection.    • Any areas of open skin can increase the risk of a post-operative wound infection by allowing bacteria to enter and travel throughout the body.  Notify your surgeon if you have any skin wounds / rashes even if it is not near the expected surgical site.  The area will need assessed to determine if surgery should be delayed until it is healed.  • The night prior to surgery sleep in a clean bed with clean clothing.  Do not allow pets to sleep with you.  • Shower on the morning of surgery using a fresh bar of anti-bacterial soap (such as Dial) and clean washcloth.  Dry with a clean towel and dress in clean clothing.  • Ask your surgeon if you will be receiving antibiotics prior to surgery.  • Make sure you, your family, and all healthcare providers clean their hands with soap and water or an alcohol based hand  before caring for you or your wound.    Day of surgery:  Upon arrival, a Pre-op nurse and Anesthesiologist will review your health history, obtain vital signs, and answer questions you may have.  The only belongings needed at this time will be your home medications and if applicable your C-PAP/BI-PAP machine.  If you are staying overnight your family can leave the rest of your belongings in the car and bring them to your room later.  A Pre-op nurse will start an IV and you may receive medication in preparation for surgery, including something to help you relax.  Your family will be able to see you in the Pre-op area.  While you are in surgery your family should notify the waiting room  if they leave the waiting room area and provide a contact phone number.    Please be  aware that surgery does come with discomfort.  We want to make every effort to control your discomfort so please discuss any uncontrolled symptoms with your nurse.   Your doctor will most likely have prescribed pain medications.      If you are going home after surgery you will receive individualized written care instructions before being discharged.  A responsible adult must drive you to and from the hospital on the day of your surgery and stay with you for 24 hours.    If you are staying overnight following surgery, you will be transported to your hospital room following the recovery period.  [facility] has all private rooms.    You have received a list of surgical assistants for your reference.  If you have any questions please call Pre-Admission Testing at [phone].  Deductibles and co-payments are collected on the day of service. Please be prepared to pay the required co-pay, deductible or deposit on the day of service as defined by your plan.

## 2018-08-01 ENCOUNTER — HOSPITAL ENCOUNTER (OUTPATIENT)
Facility: HOSPITAL | Age: 63
Discharge: HOME | End: 2018-08-02
Attending: SURGERY | Admitting: SURGERY

## 2018-08-01 ENCOUNTER — HOSPITAL ENCOUNTER (OUTPATIENT)
Dept: NUCLEAR MEDICINE | Facility: HOSPITAL | Age: 63
Discharge: HOME | End: 2018-08-01
Attending: SURGERY

## 2018-08-01 ENCOUNTER — ANESTHESIA EVENT (OUTPATIENT)
Dept: PERIOP | Facility: HOSPITAL | Age: 63
End: 2018-08-01

## 2018-08-01 ENCOUNTER — ANESTHESIA (OUTPATIENT)
Dept: PERIOP | Facility: HOSPITAL | Age: 63
End: 2018-08-01

## 2018-08-01 DIAGNOSIS — Z17.0 MALIGNANT NEOPLASM OF OVERLAPPING SITES OF RIGHT BREAST IN FEMALE, ESTROGEN RECEPTOR POSITIVE (HCC): ICD-10-CM

## 2018-08-01 DIAGNOSIS — C50.811 MALIGNANT NEOPLASM OF OVERLAPPING SITES OF RIGHT BREAST IN FEMALE, ESTROGEN RECEPTOR POSITIVE (HCC): ICD-10-CM

## 2018-08-01 PROCEDURE — 19303 MAST SIMPLE COMPLETE: CPT | Performed by: SURGERY

## 2018-08-01 PROCEDURE — 25010000002 ONDANSETRON PER 1 MG: Performed by: NURSE ANESTHETIST, CERTIFIED REGISTERED

## 2018-08-01 PROCEDURE — A9541 TC99M SULFUR COLLOID: HCPCS | Performed by: SURGERY

## 2018-08-01 PROCEDURE — 38525 BIOPSY/REMOVAL LYMPH NODES: CPT | Performed by: SURGERY

## 2018-08-01 PROCEDURE — 25010000003 CEFAZOLIN 1-4 GM/50ML-% SOLUTION: Performed by: PLASTIC SURGERY

## 2018-08-01 PROCEDURE — 25010000002 PROPOFOL 10 MG/ML EMULSION: Performed by: NURSE ANESTHETIST, CERTIFIED REGISTERED

## 2018-08-01 PROCEDURE — 25010000002 FENTANYL CITRATE (PF) 100 MCG/2ML SOLUTION: Performed by: ANESTHESIOLOGY

## 2018-08-01 PROCEDURE — C1789 PROSTHESIS, BREAST, IMP: HCPCS | Performed by: SURGERY

## 2018-08-01 PROCEDURE — 0 TECHNETIUM FILTERED SULFUR COLLOID: Performed by: SURGERY

## 2018-08-01 PROCEDURE — 88307 TISSUE EXAM BY PATHOLOGIST: CPT | Performed by: SURGERY

## 2018-08-01 PROCEDURE — 25010000002 DEXAMETHASONE PER 1 MG: Performed by: NURSE ANESTHETIST, CERTIFIED REGISTERED

## 2018-08-01 PROCEDURE — 38792 RA TRACER ID OF SENTINL NODE: CPT

## 2018-08-01 PROCEDURE — 25010000002 MIDAZOLAM PER 1 MG: Performed by: ANESTHESIOLOGY

## 2018-08-01 PROCEDURE — 25010000002 HYDROMORPHONE PER 4 MG: Performed by: NURSE ANESTHETIST, CERTIFIED REGISTERED

## 2018-08-01 PROCEDURE — 88331 PATH CONSLTJ SURG 1 BLK 1SPC: CPT | Performed by: SURGERY

## 2018-08-01 PROCEDURE — 38900 IO MAP OF SENT LYMPH NODE: CPT | Performed by: SURGERY

## 2018-08-01 PROCEDURE — 25010000002 MORPHINE SULFATE (PF) 2 MG/ML SOLUTION: Performed by: PLASTIC SURGERY

## 2018-08-01 PROCEDURE — 25010000002 ONDANSETRON PER 1 MG: Performed by: PLASTIC SURGERY

## 2018-08-01 PROCEDURE — 88360 TUMOR IMMUNOHISTOCHEM/MANUAL: CPT | Performed by: SURGERY

## 2018-08-01 PROCEDURE — 25010000003 CEFAZOLIN IN DEXTROSE 2-4 GM/100ML-% SOLUTION: Performed by: SURGERY

## 2018-08-01 PROCEDURE — 25010000002 FENTANYL CITRATE (PF) 100 MCG/2ML SOLUTION: Performed by: NURSE ANESTHETIST, CERTIFIED REGISTERED

## 2018-08-01 PROCEDURE — 88305 TISSUE EXAM BY PATHOLOGIST: CPT | Performed by: SURGERY

## 2018-08-01 DEVICE — GRFT TISS ALLODERM RTM PERF THK 9.6X19.3CM: Type: IMPLANTABLE DEVICE | Site: BREAST | Status: FUNCTIONAL

## 2018-08-01 DEVICE — TEXTURED, HIGH PROFILE, SUTURE TABS, INTEGRAL INJECTION DOME, 475CC
Type: IMPLANTABLE DEVICE | Site: BREAST | Status: FUNCTIONAL
Brand: ARTOURA BREAST TISSUE EXPANDER

## 2018-08-01 RX ORDER — ROCURONIUM BROMIDE 10 MG/ML
INJECTION, SOLUTION INTRAVENOUS AS NEEDED
Status: DISCONTINUED | OUTPATIENT
Start: 2018-08-01 | End: 2018-08-01 | Stop reason: SURG

## 2018-08-01 RX ORDER — SODIUM CHLORIDE 0.9 % (FLUSH) 0.9 %
1-10 SYRINGE (ML) INJECTION AS NEEDED
Status: DISCONTINUED | OUTPATIENT
Start: 2018-08-01 | End: 2018-08-02 | Stop reason: HOSPADM

## 2018-08-01 RX ORDER — MAGNESIUM HYDROXIDE 1200 MG/15ML
LIQUID ORAL AS NEEDED
Status: DISCONTINUED | OUTPATIENT
Start: 2018-08-01 | End: 2018-08-01 | Stop reason: HOSPADM

## 2018-08-01 RX ORDER — SODIUM CHLORIDE, SODIUM LACTATE, POTASSIUM CHLORIDE, CALCIUM CHLORIDE 600; 310; 30; 20 MG/100ML; MG/100ML; MG/100ML; MG/100ML
INJECTION, SOLUTION INTRAVENOUS CONTINUOUS PRN
Status: DISCONTINUED | OUTPATIENT
Start: 2018-08-01 | End: 2018-08-01 | Stop reason: SURG

## 2018-08-01 RX ORDER — BUPIVACAINE HYDROCHLORIDE AND EPINEPHRINE 2.5; 5 MG/ML; UG/ML
INJECTION, SOLUTION INFILTRATION; PERINEURAL AS NEEDED
Status: DISCONTINUED | OUTPATIENT
Start: 2018-08-01 | End: 2018-08-01 | Stop reason: HOSPADM

## 2018-08-01 RX ORDER — ACETAMINOPHEN 325 MG/1
650 TABLET ORAL EVERY 4 HOURS PRN
Status: DISCONTINUED | OUTPATIENT
Start: 2018-08-01 | End: 2018-08-02 | Stop reason: HOSPADM

## 2018-08-01 RX ORDER — CELECOXIB 200 MG/1
400 CAPSULE ORAL ONCE
Status: DISCONTINUED | OUTPATIENT
Start: 2018-08-01 | End: 2018-08-01 | Stop reason: HOSPADM

## 2018-08-01 RX ORDER — LIDOCAINE HYDROCHLORIDE 20 MG/ML
INJECTION, SOLUTION INFILTRATION; PERINEURAL AS NEEDED
Status: DISCONTINUED | OUTPATIENT
Start: 2018-08-01 | End: 2018-08-01 | Stop reason: SURG

## 2018-08-01 RX ORDER — FAMOTIDINE 10 MG/ML
20 INJECTION, SOLUTION INTRAVENOUS ONCE
Status: COMPLETED | OUTPATIENT
Start: 2018-08-01 | End: 2018-08-01

## 2018-08-01 RX ORDER — ONDANSETRON 4 MG/1
4 TABLET, ORALLY DISINTEGRATING ORAL EVERY 6 HOURS PRN
Status: DISCONTINUED | OUTPATIENT
Start: 2018-08-01 | End: 2018-08-02 | Stop reason: HOSPADM

## 2018-08-01 RX ORDER — MIDAZOLAM HYDROCHLORIDE 1 MG/ML
1 INJECTION INTRAMUSCULAR; INTRAVENOUS
Status: DISCONTINUED | OUTPATIENT
Start: 2018-08-01 | End: 2018-08-01 | Stop reason: HOSPADM

## 2018-08-01 RX ORDER — DIAZEPAM 5 MG/1
10 TABLET ORAL ONCE
Status: COMPLETED | OUTPATIENT
Start: 2018-08-01 | End: 2018-08-01

## 2018-08-01 RX ORDER — SODIUM CHLORIDE, SODIUM LACTATE, POTASSIUM CHLORIDE, CALCIUM CHLORIDE 600; 310; 30; 20 MG/100ML; MG/100ML; MG/100ML; MG/100ML
9 INJECTION, SOLUTION INTRAVENOUS CONTINUOUS
Status: DISCONTINUED | OUTPATIENT
Start: 2018-08-01 | End: 2018-08-02 | Stop reason: HOSPADM

## 2018-08-01 RX ORDER — MORPHINE SULFATE 2 MG/ML
4 INJECTION, SOLUTION INTRAMUSCULAR; INTRAVENOUS
Status: DISCONTINUED | OUTPATIENT
Start: 2018-08-01 | End: 2018-08-02 | Stop reason: HOSPADM

## 2018-08-01 RX ORDER — PROMETHAZINE HYDROCHLORIDE 25 MG/1
25 TABLET ORAL ONCE AS NEEDED
Status: DISCONTINUED | OUTPATIENT
Start: 2018-08-01 | End: 2018-08-01 | Stop reason: HOSPADM

## 2018-08-01 RX ORDER — LIDOCAINE AND PRILOCAINE 25; 25 MG/G; MG/G
CREAM TOPICAL ONCE
Status: COMPLETED | OUTPATIENT
Start: 2018-08-01 | End: 2018-08-01

## 2018-08-01 RX ORDER — LIDOCAINE HYDROCHLORIDE 10 MG/ML
0.5 INJECTION, SOLUTION EPIDURAL; INFILTRATION; INTRACAUDAL; PERINEURAL ONCE AS NEEDED
Status: DISCONTINUED | OUTPATIENT
Start: 2018-08-01 | End: 2018-08-01 | Stop reason: HOSPADM

## 2018-08-01 RX ORDER — PROMETHAZINE HYDROCHLORIDE 25 MG/ML
12.5 INJECTION, SOLUTION INTRAMUSCULAR; INTRAVENOUS ONCE AS NEEDED
Status: DISCONTINUED | OUTPATIENT
Start: 2018-08-01 | End: 2018-08-01 | Stop reason: HOSPADM

## 2018-08-01 RX ORDER — SODIUM CHLORIDE, SODIUM LACTATE, POTASSIUM CHLORIDE, CALCIUM CHLORIDE 600; 310; 30; 20 MG/100ML; MG/100ML; MG/100ML; MG/100ML
75 INJECTION, SOLUTION INTRAVENOUS CONTINUOUS
Status: DISCONTINUED | OUTPATIENT
Start: 2018-08-01 | End: 2018-08-02 | Stop reason: HOSPADM

## 2018-08-01 RX ORDER — OXYCODONE AND ACETAMINOPHEN 7.5; 325 MG/1; MG/1
1 TABLET ORAL ONCE AS NEEDED
Status: DISCONTINUED | OUTPATIENT
Start: 2018-08-01 | End: 2018-08-01 | Stop reason: HOSPADM

## 2018-08-01 RX ORDER — NALOXONE HCL 0.4 MG/ML
0.4 VIAL (ML) INJECTION
Status: DISCONTINUED | OUTPATIENT
Start: 2018-08-01 | End: 2018-08-02 | Stop reason: HOSPADM

## 2018-08-01 RX ORDER — PROPOFOL 10 MG/ML
VIAL (ML) INTRAVENOUS AS NEEDED
Status: DISCONTINUED | OUTPATIENT
Start: 2018-08-01 | End: 2018-08-01 | Stop reason: SURG

## 2018-08-01 RX ORDER — MEPERIDINE HYDROCHLORIDE 25 MG/ML
12.5 INJECTION INTRAMUSCULAR; INTRAVENOUS; SUBCUTANEOUS
Status: DISCONTINUED | OUTPATIENT
Start: 2018-08-01 | End: 2018-08-01 | Stop reason: HOSPADM

## 2018-08-01 RX ORDER — OXYCODONE AND ACETAMINOPHEN 7.5; 325 MG/1; MG/1
1 TABLET ORAL EVERY 4 HOURS PRN
Status: DISCONTINUED | OUTPATIENT
Start: 2018-08-01 | End: 2018-08-02 | Stop reason: HOSPADM

## 2018-08-01 RX ORDER — PROMETHAZINE HYDROCHLORIDE 25 MG/1
25 TABLET ORAL EVERY 6 HOURS PRN
Status: DISCONTINUED | OUTPATIENT
Start: 2018-08-01 | End: 2018-08-02 | Stop reason: HOSPADM

## 2018-08-01 RX ORDER — ONDANSETRON 2 MG/ML
INJECTION INTRAMUSCULAR; INTRAVENOUS AS NEEDED
Status: DISCONTINUED | OUTPATIENT
Start: 2018-08-01 | End: 2018-08-01 | Stop reason: SURG

## 2018-08-01 RX ORDER — EPHEDRINE SULFATE 50 MG/ML
5 INJECTION, SOLUTION INTRAVENOUS ONCE AS NEEDED
Status: DISCONTINUED | OUTPATIENT
Start: 2018-08-01 | End: 2018-08-01 | Stop reason: HOSPADM

## 2018-08-01 RX ORDER — LABETALOL HYDROCHLORIDE 5 MG/ML
5 INJECTION, SOLUTION INTRAVENOUS
Status: DISCONTINUED | OUTPATIENT
Start: 2018-08-01 | End: 2018-08-01 | Stop reason: HOSPADM

## 2018-08-01 RX ORDER — PROMETHAZINE HYDROCHLORIDE 25 MG/1
25 SUPPOSITORY RECTAL EVERY 6 HOURS PRN
Status: DISCONTINUED | OUTPATIENT
Start: 2018-08-01 | End: 2018-08-02 | Stop reason: HOSPADM

## 2018-08-01 RX ORDER — LIDOCAINE HYDROCHLORIDE 40 MG/ML
SOLUTION TOPICAL AS NEEDED
Status: DISCONTINUED | OUTPATIENT
Start: 2018-08-01 | End: 2018-08-01 | Stop reason: SURG

## 2018-08-01 RX ORDER — HYDROMORPHONE HCL 110MG/55ML
PATIENT CONTROLLED ANALGESIA SYRINGE INTRAVENOUS AS NEEDED
Status: DISCONTINUED | OUTPATIENT
Start: 2018-08-01 | End: 2018-08-01 | Stop reason: SURG

## 2018-08-01 RX ORDER — PROMETHAZINE HYDROCHLORIDE 25 MG/1
12.5 TABLET ORAL ONCE AS NEEDED
Status: DISCONTINUED | OUTPATIENT
Start: 2018-08-01 | End: 2018-08-01 | Stop reason: HOSPADM

## 2018-08-01 RX ORDER — NALOXONE HCL 0.4 MG/ML
0.2 VIAL (ML) INJECTION AS NEEDED
Status: DISCONTINUED | OUTPATIENT
Start: 2018-08-01 | End: 2018-08-01 | Stop reason: HOSPADM

## 2018-08-01 RX ORDER — FLUMAZENIL 0.1 MG/ML
0.2 INJECTION INTRAVENOUS AS NEEDED
Status: DISCONTINUED | OUTPATIENT
Start: 2018-08-01 | End: 2018-08-01 | Stop reason: HOSPADM

## 2018-08-01 RX ORDER — SODIUM CHLORIDE 0.9 % (FLUSH) 0.9 %
1-10 SYRINGE (ML) INJECTION AS NEEDED
Status: DISCONTINUED | OUTPATIENT
Start: 2018-08-01 | End: 2018-08-01 | Stop reason: HOSPADM

## 2018-08-01 RX ORDER — DEXAMETHASONE SODIUM PHOSPHATE 10 MG/ML
INJECTION INTRAMUSCULAR; INTRAVENOUS AS NEEDED
Status: DISCONTINUED | OUTPATIENT
Start: 2018-08-01 | End: 2018-08-01 | Stop reason: SURG

## 2018-08-01 RX ORDER — ACETAMINOPHEN 500 MG
1000 TABLET ORAL ONCE
Status: COMPLETED | OUTPATIENT
Start: 2018-08-01 | End: 2018-08-01

## 2018-08-01 RX ORDER — ONDANSETRON 2 MG/ML
4 INJECTION INTRAMUSCULAR; INTRAVENOUS ONCE AS NEEDED
Status: DISCONTINUED | OUTPATIENT
Start: 2018-08-01 | End: 2018-08-01 | Stop reason: HOSPADM

## 2018-08-01 RX ORDER — SCOLOPAMINE TRANSDERMAL SYSTEM 1 MG/1
1 PATCH, EXTENDED RELEASE TRANSDERMAL
Status: DISCONTINUED | OUTPATIENT
Start: 2018-08-01 | End: 2018-08-01 | Stop reason: HOSPADM

## 2018-08-01 RX ORDER — ONDANSETRON 4 MG/1
4 TABLET, FILM COATED ORAL EVERY 6 HOURS PRN
Status: DISCONTINUED | OUTPATIENT
Start: 2018-08-01 | End: 2018-08-02 | Stop reason: HOSPADM

## 2018-08-01 RX ORDER — CEFAZOLIN SODIUM 2 G/100ML
2 INJECTION, SOLUTION INTRAVENOUS ONCE
Status: COMPLETED | OUTPATIENT
Start: 2018-08-01 | End: 2018-08-01

## 2018-08-01 RX ORDER — ONDANSETRON 2 MG/ML
4 INJECTION INTRAMUSCULAR; INTRAVENOUS EVERY 6 HOURS PRN
Status: DISCONTINUED | OUTPATIENT
Start: 2018-08-01 | End: 2018-08-02 | Stop reason: HOSPADM

## 2018-08-01 RX ORDER — HYDROMORPHONE HYDROCHLORIDE 1 MG/ML
0.5 INJECTION, SOLUTION INTRAMUSCULAR; INTRAVENOUS; SUBCUTANEOUS
Status: DISCONTINUED | OUTPATIENT
Start: 2018-08-01 | End: 2018-08-01 | Stop reason: HOSPADM

## 2018-08-01 RX ORDER — PROMETHAZINE HYDROCHLORIDE 25 MG/ML
12.5 INJECTION, SOLUTION INTRAMUSCULAR; INTRAVENOUS EVERY 6 HOURS PRN
Status: DISCONTINUED | OUTPATIENT
Start: 2018-08-01 | End: 2018-08-02 | Stop reason: HOSPADM

## 2018-08-01 RX ORDER — FENTANYL CITRATE 50 UG/ML
50 INJECTION, SOLUTION INTRAMUSCULAR; INTRAVENOUS
Status: DISCONTINUED | OUTPATIENT
Start: 2018-08-01 | End: 2018-08-01 | Stop reason: HOSPADM

## 2018-08-01 RX ORDER — HYDROCODONE BITARTRATE AND ACETAMINOPHEN 7.5; 325 MG/1; MG/1
1 TABLET ORAL ONCE AS NEEDED
Status: DISCONTINUED | OUTPATIENT
Start: 2018-08-01 | End: 2018-08-01 | Stop reason: HOSPADM

## 2018-08-01 RX ORDER — CEFAZOLIN SODIUM 1 G/50ML
1 INJECTION, SOLUTION INTRAVENOUS EVERY 8 HOURS
Status: COMPLETED | OUTPATIENT
Start: 2018-08-01 | End: 2018-08-02

## 2018-08-01 RX ORDER — DIPHENHYDRAMINE HYDROCHLORIDE 50 MG/ML
12.5 INJECTION INTRAMUSCULAR; INTRAVENOUS
Status: DISCONTINUED | OUTPATIENT
Start: 2018-08-01 | End: 2018-08-01 | Stop reason: HOSPADM

## 2018-08-01 RX ORDER — MIDAZOLAM HYDROCHLORIDE 1 MG/ML
2 INJECTION INTRAMUSCULAR; INTRAVENOUS
Status: DISCONTINUED | OUTPATIENT
Start: 2018-08-01 | End: 2018-08-01 | Stop reason: HOSPADM

## 2018-08-01 RX ORDER — PROMETHAZINE HYDROCHLORIDE 25 MG/1
25 SUPPOSITORY RECTAL ONCE AS NEEDED
Status: DISCONTINUED | OUTPATIENT
Start: 2018-08-01 | End: 2018-08-01 | Stop reason: HOSPADM

## 2018-08-01 RX ADMIN — FENTANYL CITRATE 50 MCG: 50 INJECTION INTRAMUSCULAR; INTRAVENOUS at 08:53

## 2018-08-01 RX ADMIN — SODIUM CHLORIDE, POTASSIUM CHLORIDE, SODIUM LACTATE AND CALCIUM CHLORIDE: 600; 310; 30; 20 INJECTION, SOLUTION INTRAVENOUS at 10:11

## 2018-08-01 RX ADMIN — FENTANYL CITRATE 50 MCG: 50 INJECTION INTRAMUSCULAR; INTRAVENOUS at 09:17

## 2018-08-01 RX ADMIN — PROPOFOL 150 MG: 10 INJECTION, EMULSION INTRAVENOUS at 08:15

## 2018-08-01 RX ADMIN — SODIUM CHLORIDE, POTASSIUM CHLORIDE, SODIUM LACTATE AND CALCIUM CHLORIDE 75 ML/HR: 600; 310; 30; 20 INJECTION, SOLUTION INTRAVENOUS at 22:29

## 2018-08-01 RX ADMIN — SODIUM CHLORIDE, POTASSIUM CHLORIDE, SODIUM LACTATE AND CALCIUM CHLORIDE: 600; 310; 30; 20 INJECTION, SOLUTION INTRAVENOUS at 12:36

## 2018-08-01 RX ADMIN — TECHNETIUM TC 99M SULFUR COLLOID 1 DOSE: KIT at 07:05

## 2018-08-01 RX ADMIN — CEFAZOLIN SODIUM 1 G: 1 INJECTION, SOLUTION INTRAVENOUS at 16:07

## 2018-08-01 RX ADMIN — MORPHINE SULFATE 2 MG: 2 INJECTION, SOLUTION INTRAMUSCULAR; INTRAVENOUS at 19:31

## 2018-08-01 RX ADMIN — FENTANYL CITRATE 50 MCG: 50 INJECTION INTRAMUSCULAR; INTRAVENOUS at 10:25

## 2018-08-01 RX ADMIN — LIDOCAINE HYDROCHLORIDE 100 MG: 20 INJECTION, SOLUTION INFILTRATION; PERINEURAL at 08:15

## 2018-08-01 RX ADMIN — CEFAZOLIN SODIUM 2 G: 2 INJECTION, SOLUTION INTRAVENOUS at 08:08

## 2018-08-01 RX ADMIN — ROCURONIUM BROMIDE 30 MG: 10 INJECTION INTRAVENOUS at 08:15

## 2018-08-01 RX ADMIN — HYDROMORPHONE HYDROCHLORIDE 0.5 MG: 2 INJECTION INTRAMUSCULAR; INTRAVENOUS; SUBCUTANEOUS at 09:35

## 2018-08-01 RX ADMIN — ROCURONIUM BROMIDE 20 MG: 10 INJECTION INTRAVENOUS at 11:31

## 2018-08-01 RX ADMIN — Medication 2 MG: at 08:08

## 2018-08-01 RX ADMIN — LIDOCAINE HYDROCHLORIDE 1 EACH: 40 SOLUTION TOPICAL at 08:17

## 2018-08-01 RX ADMIN — FAMOTIDINE 20 MG: 10 INJECTION, SOLUTION INTRAVENOUS at 06:44

## 2018-08-01 RX ADMIN — DIAZEPAM 10 MG: 5 TABLET ORAL at 06:41

## 2018-08-01 RX ADMIN — FENTANYL CITRATE 50 MCG: 50 INJECTION INTRAMUSCULAR; INTRAVENOUS at 08:15

## 2018-08-01 RX ADMIN — ACETAMINOPHEN 1000 MG: 500 TABLET, FILM COATED ORAL at 06:00

## 2018-08-01 RX ADMIN — ONDANSETRON 4 MG: 2 INJECTION INTRAMUSCULAR; INTRAVENOUS at 13:23

## 2018-08-01 RX ADMIN — SODIUM CHLORIDE, POTASSIUM CHLORIDE, SODIUM LACTATE AND CALCIUM CHLORIDE 75 ML/HR: 600; 310; 30; 20 INJECTION, SOLUTION INTRAVENOUS at 15:24

## 2018-08-01 RX ADMIN — HYDROMORPHONE HYDROCHLORIDE 0.5 MG: 1 INJECTION, SOLUTION INTRAMUSCULAR; INTRAVENOUS; SUBCUTANEOUS at 14:24

## 2018-08-01 RX ADMIN — LIDOCAINE AND PRILOCAINE 1 APPLICATION: 25; 25 CREAM TOPICAL at 06:00

## 2018-08-01 RX ADMIN — DEXAMETHASONE SODIUM PHOSPHATE 6 MG: 10 INJECTION INTRAMUSCULAR; INTRAVENOUS at 08:33

## 2018-08-01 RX ADMIN — FENTANYL CITRATE 50 MCG: 50 INJECTION, SOLUTION INTRAMUSCULAR; INTRAVENOUS at 14:04

## 2018-08-01 RX ADMIN — ONDANSETRON 4 MG: 2 INJECTION, SOLUTION INTRAMUSCULAR; INTRAVENOUS at 19:31

## 2018-08-01 RX ADMIN — SCOPALAMINE 1 PATCH: 1 PATCH, EXTENDED RELEASE TRANSDERMAL at 06:44

## 2018-08-01 RX ADMIN — SODIUM CHLORIDE, POTASSIUM CHLORIDE, SODIUM LACTATE AND CALCIUM CHLORIDE: 600; 310; 30; 20 INJECTION, SOLUTION INTRAVENOUS at 06:51

## 2018-08-01 RX ADMIN — FENTANYL CITRATE 50 MCG: 50 INJECTION INTRAMUSCULAR; INTRAVENOUS at 11:29

## 2018-08-01 RX ADMIN — SODIUM CHLORIDE, POTASSIUM CHLORIDE, SODIUM LACTATE AND CALCIUM CHLORIDE 9 ML/HR: 600; 310; 30; 20 INJECTION, SOLUTION INTRAVENOUS at 06:42

## 2018-08-01 NOTE — ANESTHESIA PROCEDURE NOTES
Airway  Urgency: elective    Date/Time: 8/1/2018 8:17 AM  Airway not difficult    General Information and Staff    Patient location during procedure: OR  Anesthesiologist: EARL AVERY  CRNA: CHEYENNE ROBERTSON    Indications and Patient Condition  Indications for airway management: airway protection    Preoxygenated: yes  MILS not maintained throughout  Mask difficulty assessment: 1 - vent by mask    Final Airway Details  Final airway type: endotracheal airway      Successful airway: ETT  Cuffed: yes   Successful intubation technique: direct laryngoscopy  Facilitating devices/methods: anterior pressure/BURP  Endotracheal tube insertion site: oral  Blade: Scott  Blade size: #3  ETT size: 7.0 mm  Cormack-Lehane Classification: grade IIa - partial view of glottis  Placement verified by: chest auscultation   Cuff volume (mL): 6  Measured from: lips  ETT to lips (cm): 20  Number of attempts at approach: 1    Additional Comments  Pre O2, SIAI

## 2018-08-01 NOTE — ANESTHESIA PREPROCEDURE EVALUATION
Anesthesia Evaluation     Patient summary reviewed and Nursing notes reviewed                Airway   Mallampati: II  Dental    (+) implants    Pulmonary - negative pulmonary ROS   Cardiovascular     ECG reviewed  Rhythm: regular  Rate: normal    (+) hypertension,       Neuro/Psych- negative ROS  GI/Hepatic/Renal/Endo    (+)  GERD,      Musculoskeletal (-) negative ROS    Abdominal    Substance History - negative use     OB/GYN negative ob/gyn ROS         Other      history of cancer active                    Anesthesia Plan    ASA 2     general     intravenous induction   Anesthetic plan and risks discussed with patient.

## 2018-08-01 NOTE — ANESTHESIA POSTPROCEDURE EVALUATION
"Patient: Carly Mendoza    Procedure Summary     Date:  08/01/18 Room / Location:  [facility] OR 02 / [facility] MAIN OR    Anesthesia Start:  0806 Anesthesia Stop:  1348    Procedures:       BILATERAL TOTAL MASTECTOMY WITH RIGHT SENTINEL LYMPH MARTINEZ BIOPSY (Bilateral Breast)      BILATERAL BREAST TISSUE EXPANDER INSERTION WITH ALLODERM (Bilateral Breast) Diagnosis:       Malignant neoplasm of overlapping sites of right breast in female, estrogen receptor positive (CMS/HCC)      (Malignant neoplasm of overlapping sites of right breast in female, estrogen receptor positive (CMS/HCC) [C50.811, Z17.0])    Surgeon:  Kwaku Gudino MD; Maurine Waterhouse, MD Provider:  Herson Plummer MD    Anesthesia Type:  general ASA Status:  2          Anesthesia Type: general  Last vitals  BP   113/72 (08/01/18 1420)   Temp   36.7 °C (98 °F) (08/01/18 1346)   Pulse   88 (08/01/18 1420)   Resp   16 (08/01/18 1420)     SpO2   97 % (08/01/18 1420)     Post Anesthesia Care and Evaluation    Patient location during evaluation: bedside  Patient participation: complete - patient participated  Level of consciousness: awake and alert  Pain management: adequate  Airway patency: patent  Anesthetic complications: No anesthetic complications    Cardiovascular status: acceptable  Respiratory status: acceptable  Hydration status: acceptable    Comments: /72 (BP Location: Left arm, Patient Position: Lying)   Pulse 88   Temp 36.7 °C (98 °F) (Oral)   Resp 16   Ht 162.6 cm (64\")   Wt 65.1 kg (143 lb 9 oz)   SpO2 97%   BMI 24.64 kg/m²       "

## 2018-08-02 VITALS
WEIGHT: 143.56 LBS | HEIGHT: 64 IN | RESPIRATION RATE: 16 BRPM | HEART RATE: 78 BPM | TEMPERATURE: 97.6 F | BODY MASS INDEX: 24.51 KG/M2 | SYSTOLIC BLOOD PRESSURE: 108 MMHG | OXYGEN SATURATION: 97 % | DIASTOLIC BLOOD PRESSURE: 69 MMHG

## 2018-08-02 PROCEDURE — 25010000003 CEFAZOLIN 1-4 GM/50ML-% SOLUTION: Performed by: PLASTIC SURGERY

## 2018-08-02 RX ORDER — OXYCODONE HYDROCHLORIDE AND ACETAMINOPHEN 5; 325 MG/1; MG/1
1 TABLET ORAL ONCE AS NEEDED
Status: DISCONTINUED | OUTPATIENT
Start: 2018-08-02 | End: 2018-08-02 | Stop reason: HOSPADM

## 2018-08-02 RX ORDER — OXYCODONE HYDROCHLORIDE AND ACETAMINOPHEN 5; 325 MG/1; MG/1
1-2 TABLET ORAL EVERY 4 HOURS PRN
Qty: 20 TABLET | Refills: 0 | Status: SHIPPED | OUTPATIENT
Start: 2018-08-02 | End: 2018-09-07

## 2018-08-02 RX ADMIN — CEFAZOLIN SODIUM 1 G: 1 INJECTION, SOLUTION INTRAVENOUS at 00:38

## 2018-08-02 RX ADMIN — OXYCODONE HYDROCHLORIDE AND ACETAMINOPHEN 1 TABLET: 7.5; 325 TABLET ORAL at 10:32

## 2018-08-02 RX ADMIN — CEFAZOLIN SODIUM 1 G: 1 INJECTION, SOLUTION INTRAVENOUS at 08:31

## 2018-08-02 RX ADMIN — OXYCODONE HYDROCHLORIDE AND ACETAMINOPHEN 1 TABLET: 7.5; 325 TABLET ORAL at 03:55

## 2018-08-10 ENCOUNTER — OFFICE VISIT (OUTPATIENT)
Dept: MAMMOGRAPHY | Facility: CLINIC | Age: 63
End: 2018-08-10

## 2018-08-10 VITALS
DIASTOLIC BLOOD PRESSURE: 72 MMHG | OXYGEN SATURATION: 98 % | SYSTOLIC BLOOD PRESSURE: 125 MMHG | TEMPERATURE: 98 F | HEART RATE: 98 BPM

## 2018-08-10 DIAGNOSIS — C50.811 MALIGNANT NEOPLASM OF OVERLAPPING SITES OF RIGHT BREAST IN FEMALE, ESTROGEN RECEPTOR POSITIVE (HCC): Primary | ICD-10-CM

## 2018-08-10 DIAGNOSIS — Z17.0 MALIGNANT NEOPLASM OF OVERLAPPING SITES OF RIGHT BREAST IN FEMALE, ESTROGEN RECEPTOR POSITIVE (HCC): Primary | ICD-10-CM

## 2018-08-10 PROCEDURE — 99024 POSTOP FOLLOW-UP VISIT: CPT | Performed by: SURGERY

## 2018-08-10 NOTE — PROGRESS NOTES
Chief Complaint: Carly Mendoza is a  62 y.o. female, initially referred by No ref. provider found , who is here today for a postoperative visit.    History of Present Illness:  In the interim,Carly Mendoza has had the following procedure and resultant pathology report: She is undergone bilateral mastectomies with a right sentinel lymph node biopsy.  The path report reveals extensive DCIS of the right breast with 2 foci of invasive cancer measuring 1 and 0.8 cm respectively.  The extreme inferior medial aspect of the specimen was positive focally for DCIS.    She has noted no redness, warmth,drainage, swelling at the incision site. Denies fever or chills.      Current Outpatient Prescriptions:   •  cephalexin (KEFLEX) 500 MG capsule, Take 1 capsule by mouth Every 8 (Eight) Hours, Disp: 40 capsule, Rfl: 0  •  chlorthalidone (HYGROTON) 25 MG tablet, Take 25 mg by mouth Daily., Disp: , Rfl:   •  fexofenadine-pseudoephedrine (ALLEGRA-D 24) 180-240 MG per 24 hr tablet, Take 1 tablet by mouth Daily., Disp: , Rfl:   •  omeprazole-sodium bicarbonate (ZEGERID)  MG per capsule, Take 1 capsule by mouth Every Morning Before Breakfast., Disp: , Rfl:   •  oxyCODONE-acetaminophen (PERCOCET) 5-325 MG per tablet, Take 1-2 tablets by mouth Every 4 (Four) Hours As Needed (Pain) for up to 20 doses., Disp: 20 tablet, Rfl: 0  •  Turmeric 500 MG tablet, Take 1,000 mg by mouth Daily., Disp: , Rfl:   Physical examination  Chest-she has undergone bilateral mastectomies with immediate reconstruction.  The incisions both look fine.  She did have a nipple sparing mastectomy on the left side and the nipple areolar complex is viable  Assessment:  Right breast cancer status post bilateral mastectomies area she appears to be healing well but is still restricted in her activity.  I have demonstrated to her how to do the wall walking exercises but asked her to wait until she gets permission from Dr. Waterhouse.    Plan:  I will see  her back 7-10 days after she has permission to do the wall walking exercises.  She does have appointments to see the medical oncologists          EMR Dragon/transcription disclaimer:    Much of this encounter note is an electronic transcription/translocation of spoken language to printed text.  The electronic translation of spoken language may permit erroneous, or at times, nonsensical words or phrases to be inadvertently transcribed.  Although I have reviewed the note from such areas, some may still exist.

## 2018-08-23 ENCOUNTER — APPOINTMENT (OUTPATIENT)
Dept: ONCOLOGY | Facility: CLINIC | Age: 63
End: 2018-08-23

## 2018-08-23 ENCOUNTER — CONSULT (OUTPATIENT)
Dept: ONCOLOGY | Facility: CLINIC | Age: 63
End: 2018-08-23

## 2018-08-23 ENCOUNTER — LAB (OUTPATIENT)
Dept: LAB | Facility: HOSPITAL | Age: 63
End: 2018-08-23

## 2018-08-23 ENCOUNTER — PREP FOR SURGERY (OUTPATIENT)
Dept: OTHER | Facility: HOSPITAL | Age: 63
End: 2018-08-23

## 2018-08-23 VITALS
SYSTOLIC BLOOD PRESSURE: 118 MMHG | HEART RATE: 93 BPM | RESPIRATION RATE: 18 BRPM | HEIGHT: 65 IN | OXYGEN SATURATION: 99 % | TEMPERATURE: 98 F | BODY MASS INDEX: 23.96 KG/M2 | WEIGHT: 143.8 LBS | DIASTOLIC BLOOD PRESSURE: 78 MMHG

## 2018-08-23 DIAGNOSIS — Z17.0 MALIGNANT NEOPLASM OF OVERLAPPING SITES OF RIGHT BREAST IN FEMALE, ESTROGEN RECEPTOR POSITIVE (HCC): Primary | ICD-10-CM

## 2018-08-23 DIAGNOSIS — C50.811 MALIGNANT NEOPLASM OF OVERLAPPING SITES OF RIGHT BREAST IN FEMALE, ESTROGEN RECEPTOR POSITIVE (HCC): Primary | ICD-10-CM

## 2018-08-23 DIAGNOSIS — C50.919 MALIGNANT NEOPLASM OF FEMALE BREAST, UNSPECIFIED ESTROGEN RECEPTOR STATUS, UNSPECIFIED LATERALITY, UNSPECIFIED SITE OF BREAST (HCC): Primary | ICD-10-CM

## 2018-08-23 LAB
BASOPHILS # BLD AUTO: 0.07 10*3/MM3 (ref 0–0.1)
BASOPHILS NFR BLD AUTO: 0.9 % (ref 0–1.1)
DEPRECATED RDW RBC AUTO: 41.7 FL (ref 37–49)
EOSINOPHIL # BLD AUTO: 0.21 10*3/MM3 (ref 0–0.36)
EOSINOPHIL NFR BLD AUTO: 2.8 % (ref 1–5)
ERYTHROCYTE [DISTWIDTH] IN BLOOD BY AUTOMATED COUNT: 14.7 % (ref 11.7–14.5)
HCT VFR BLD AUTO: 39.2 % (ref 34–45)
HGB BLD-MCNC: 12.5 G/DL (ref 11.5–14.9)
IMM GRANULOCYTES # BLD: 0.02 10*3/MM3 (ref 0–0.03)
IMM GRANULOCYTES NFR BLD: 0.3 % (ref 0–0.5)
LYMPHOCYTES # BLD AUTO: 2.49 10*3/MM3 (ref 1–3.5)
LYMPHOCYTES NFR BLD AUTO: 33.1 % (ref 20–49)
MCH RBC QN AUTO: 25.2 PG (ref 27–33)
MCHC RBC AUTO-ENTMCNC: 31.9 G/DL (ref 32–35)
MCV RBC AUTO: 79 FL (ref 83–97)
MONOCYTES # BLD AUTO: 0.7 10*3/MM3 (ref 0.25–0.8)
MONOCYTES NFR BLD AUTO: 9.3 % (ref 4–12)
NEUTROPHILS # BLD AUTO: 4.04 10*3/MM3 (ref 1.5–7)
NEUTROPHILS NFR BLD AUTO: 53.6 % (ref 39–75)
NRBC BLD MANUAL-RTO: 0 /100 WBC (ref 0–0)
PLATELET # BLD AUTO: 418 10*3/MM3 (ref 150–375)
PMV BLD AUTO: 9.6 FL (ref 8.9–12.1)
RBC # BLD AUTO: 4.96 10*6/MM3 (ref 3.9–5)
WBC NRBC COR # BLD: 7.53 10*3/MM3 (ref 4–10)

## 2018-08-23 PROCEDURE — 36415 COLL VENOUS BLD VENIPUNCTURE: CPT | Performed by: INTERNAL MEDICINE

## 2018-08-23 PROCEDURE — G0463 HOSPITAL OUTPT CLINIC VISIT: HCPCS | Performed by: INTERNAL MEDICINE

## 2018-08-23 PROCEDURE — 85025 COMPLETE CBC W/AUTO DIFF WBC: CPT | Performed by: INTERNAL MEDICINE

## 2018-08-23 PROCEDURE — 99245 OFF/OP CONSLTJ NEW/EST HI 55: CPT | Performed by: INTERNAL MEDICINE

## 2018-08-23 RX ORDER — CEFAZOLIN SODIUM 2 G/100ML
2 INJECTION, SOLUTION INTRAVENOUS ONCE
Status: CANCELLED | OUTPATIENT
Start: 2018-08-23 | End: 2018-08-23

## 2018-08-23 NOTE — PROGRESS NOTES
Subjective     REASON FOR CONSULTATION:  Multifocal right breast cancer post bilateral mastectomies  Provide an opinion on any further workup or treatment                             REQUESTING PHYSICIAN:  Kwaku Gudino M.D.    RECORDS OBTAINED:  Records of the patients history including those obtained from the referring provider were reviewed and summarized in detail.    HISTORY OF PRESENT ILLNESS:  The patient is a 62 y.o. year old female who is here for an opinion about the above issue.    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  The patient had never had a previous mammogram and was referred for evaluation at which time she was found to have a 4.2 cm mass in the upper outer quadrant of the right breast at the 9:30 position associated with pleomorphic calcifications in the lower inner quadrant also felt to be suspicious left breast was benign.  Patient underwent ultrasound guided biopsy on 6/15/18 with the biopsy at the 9:30 position with clip placement showing invasive mammary carcinoma ductal type low-grade ER % VA 9800% HER-2 2+ negative by fish.  Patient also underwent biopsy at the 4:00 position which showed a microscopic focus of invasive ductal carcinoma felt to be probably a lymphatic space involvement and was too small to do additional testing and a clip was placed.  Patient went on to have bilateral breast MRIs and was referred to Dr. Gudino MRIs confirmed 2 areas of abnormality at the breast-lower inner quadrant showed a large hematoma with a clip identified and there is extensive clumped abnormal enhancement beginning superior to the biopsy cavity and extending laterally to the entire upper half of the right breast into the upper outer quadrant measuring 1.2 x 1.4 cm and extending over 7 cm.  A biopsy clip was identified in this area the abnormal  enhancement focally abutted the pectoralis muscle medially with no invasion through few borderline enlarged right axillary nodes with normal morphology no internal mammary adenopathy was noted left breast was benign.  Patient went on to have bilateral mastectomies the final path report showed 2 separate primaries in the breast associated with extensive high-grade DCIS measuring 6 cm  The lower inner quadrant lesion measured 1 cm was a grade 3 invasive ductal carcinoma ER 0 TX 0 HER-2 3+ and the lesion at the 9:30 position was a grade 1 invasive ductal carcinoma measuring 8 mm strongly ER/TX positive and HER-2 negative based on preop testing.  3 sentinel nodes were obtained 1 showed isolated tumor cells.  Margins showed  Positivity with DCIS in the inferior medial margin and was close to the deep margin (2mm) and inferior superficial margin(3mm).  Lymphovascular invasion was present.  Left breast is benign    Postoperatively she's done well and had her expanders filled and is here today to discuss adjuvant treatment.  Unfortunately she did not know about the testing on the second tumor at surgery and thought she had a grade 1 ER/TX positive HER-2 negative tumor and was very surprised when I told her the prior markers on the second tumor.    She is  0 para 0 menarche was at age 13 menopause at age 33 when she had a hysterectomy and oophorectomy.  She's been on hormone replacement since age of 33 until her recent diagnosis .    Family history is positive for mother having colon cancer age 72 maternal aunt with breast cancer in her 60s and another maternal aunt with uterine cancer in her 70s.  She's had negative genetic testing but the extended panel is being done and is pending    Past Medical History:   Diagnosis Date   • Environmental allergies    • GERD (gastroesophageal reflux disease)    • Hypertension    • Malignant neoplasm of overlapping sites of right breast in female, estrogen receptor positive  (CMS/[facility]) 7/18/2018        Past Surgical History:   Procedure Laterality Date   • BREAST BIOPSY Right 2018    Malignant   • BREAST RECONSTRUCTION Bilateral 8/1/2018    Procedure: BILATERAL BREAST TISSUE EXPANDER INSERTION WITH ALLODERM;  Surgeon: Waterhouse, Maurine, MD;  Location: [facility] MAIN OR;  Service: Plastics   • COLONOSCOPY N/A 3/24/2017    Procedure: COLONOSCOPY TO CECUM AND TI;  Surgeon: Aleksander Bella MD;  Location: [facility] ENDOSCOPY;  Service:    • ENDOSCOPY N/A 3/24/2017    Procedure: ESOPHAGOGASTRODUODENOSCOPY;  Surgeon: Aleksander Bella MD;  Location: [facility] ENDOSCOPY;  Service:    • HYSTERECTOMY     • MASTECTOMY W/ SENTINEL NODE BIOPSY Bilateral 8/1/2018    Procedure: BILATERAL TOTAL MASTECTOMY WITH RIGHT SENTINEL LYMPH MARTINEZ BIOPSY;  Surgeon: Kwaku Gudino MD;  Location: [facility] MAIN OR;  Service: General   • OOPHORECTOMY          Current Outpatient Prescriptions on File Prior to Visit   Medication Sig Dispense Refill   • chlorthalidone (HYGROTON) 25 MG tablet Take 25 mg by mouth Daily.     • fexofenadine-pseudoephedrine (ALLEGRA-D 24) 180-240 MG per 24 hr tablet Take 1 tablet by mouth Daily.     • omeprazole-sodium bicarbonate (ZEGERID)  MG per capsule Take 1 capsule by mouth Every Morning Before Breakfast.     • Turmeric 500 MG tablet Take 1,000 mg by mouth Daily.     • cephalexin (KEFLEX) 500 MG capsule Take 1 capsule by mouth Every 8 (Eight) Hours 40 capsule 0   • oxyCODONE-acetaminophen (PERCOCET) 5-325 MG per tablet Take 1-2 tablets by mouth Every 4 (Four) Hours As Needed (Pain) for up to 20 doses. 20 tablet 0     No current facility-administered medications on file prior to visit.         ALLERGIES:    Allergies   Allergen Reactions   • Moxifloxacin Anaphylaxis and Rash   • Latex Rash     Added based on information entered during log entry, please review and add reactions, type, and severity as needed        Social History     Social History   • Marital status:      Spouse  "name: FLORINA Gross     Occupational History   •  [employer]     Social History Main Topics   • Smoking status: Never Smoker   • Smokeless tobacco: Never Used   • Alcohol use Yes     1 Glasses of wine per week      Comment: social   • Drug use: No   • Sexual activity: Defer      Comment:      Other Topics Concern   • Not on file        Family History   Problem Relation Age of Onset   • Colon cancer Mother 73   • Breast cancer Maternal Aunt 60   • Uterine cancer Maternal Aunt 75   • Malig Hyperthermia Neg Hx         Review of Systems   Constitutional: Positive for diaphoresis.   Gastrointestinal:        Chronic indigestion   Endocrine: Positive for heat intolerance.   Neurological: Negative for numbness.        Objective     Vitals:    08/23/18 1310   BP: 118/78   Pulse: 93   Resp: 18   Temp: 98 °F (36.7 °C)   SpO2: 99%   Weight: 65.2 kg (143 lb 12.8 oz)   Height: 165 cm (64.96\")   PainSc: 0-No pain     Current Status 8/23/2018   ECOG score 0       Physical Exam    GENERAL:  Well-developed, well-nourished in no acute distress.   SKIN:  Warm, dry without rashes, purpura or petechiae.  EYES:  Pupils equal, round and reactive to light.  EOMs intact.  Conjunctivae normal.  EARS:  Hearing intact.  NOSE:  Septum midline.  No excoriations or nasal discharge.  MOUTH:  Tongue is well-papillated; no stomatitis or ulcers.  Lips normal.  THROAT:  Oropharynx without lesions or exudates.  NECK:  Supple with good range of motion; no thyromegaly or masses, no JVD.  LYMPHATICS:  No cervical, supraclavicular, axillary or inguinal adenopathy.  CHEST:  Lungs clear to auscultation. Good airflow.  BREASTS: biLateral mastectomies with expanders in place  CARDIAC:  Regular rate and rhythm without murmurs, rubs or gallops. Normal S1,S2.  ABDOMEN:  Soft, nontender with no hepatosplenomegaly or masses.  EXTREMITIES:  No clubbing, cyanosis or edema.  NEUROLOGICAL:  Cranial Nerves II-XII grossly intact.  No focal " neurological deficits.  PSYCHIATRIC:  Normal affect and mood.        RECENT LABS:  Hematology WBC   Date Value Ref Range Status   08/23/2018 7.53 4.00 - 10.00 10*3/mm3 Final     RBC   Date Value Ref Range Status   08/23/2018 4.96 3.90 - 5.00 10*6/mm3 Final     Hemoglobin   Date Value Ref Range Status   08/23/2018 12.5 11.5 - 14.9 g/dL Final     Hematocrit   Date Value Ref Range Status   08/23/2018 39.2 34.0 - 45.0 % Final     Platelets   Date Value Ref Range Status   08/23/2018 418 (H) 150 - 375 10*3/mm3 Final        SYNOPTIC REPORT (Based on [facility] Cancer Case Summary, version January 2018):                Procedure: Total mastectomy.               Specimen/tumor laterality: Right.                Tumor site: Lower inner quadrant and central/inferior breast.                 Tumor size (greastest dimension of largest invasive focus): 1 cm (lower inner                       quadrant tumor).               Histologic type: Invasive carcinoma of no special type (ductal, not otherwise specified).                 Histologic grade (Frannie Histologic Score):                              Glandular (acinar)/tubular differentiation: Score 3.                             Nuclear pleomorphism: Score 3.                             Mitotic rate: Score 2.                            Overall grade: Grade 3 (score 8 of 9).                            NOTE: The above Frannie Histologic Score applies to the largest and                                    highest grade tumor (lower inner quadrant tumor).  The tumor in the                                    central/inferior breast is intermediate grade (tubular score 3, nuclear                                    score 2, mitotic score 1).               Tumor focality: Multiple foci of invasive carcinoma.                             Number of foci: 2.                              Sizes of individual foci: 1 cm (lower inner quadrant tumor) and approximately                                     0.8 cm (central/inferior tumor).               Ductal carcinoma in situ (DCIS): Present, positive for extensive intraductal component.                            Size (extent) of DCIS: Approximately greater than 6 cm (estimated based                                    on gross and microscopic findings).                            Architectural pattern: Solid and cribriform.                            Nuclear grade: Grade III (high).                            Necrosis: Central comedonecrosis.               Lobular carcinoma in situ (LCIS): No LCIS in specimen.               Margins:                             Invasive carcinoma margins: Uninvolved by invasive carcinoma.                                          Distance from closest margin: 2 mm (deep margin).                             DCIS margins: Positive for DCIS (inferior medial margin [lower inner quadrant]).                                          Additional DCIS margins: DCIS extends to within 2 mm of deep margin                                                 and to within 3 mm of inferior superficial soft tissue margin.  Atypical                                                 ductal hyperplasia is focally present at the deep margin.               Regional lymph nodes: Involved by tumor cells.                             Number of lymph nodes with macrometastases: 0.                            Number of lymph nodes with micrometastases: 0.                            Number of lymph nodes with isolated tumor cells: 1.                            Number of lymph nodes examined: 3.                             Number of sentinel lymph nodes examined: 3.                Treatment effect: No known presurgical therapy.                Lymphovascular invasion: Present.               Dermal lymphovascular invasion: Not identified.                Pathologic stage classification:                             TNM descriptors: m (multiple foci of invasive carcinoma).                              Primary tumor: pT1b.                            Regional lymph nodes: pN0(i+)(sn).                             Distant metastasis: Not applicable.                Additional pathologic findings:                              Ductal hyperplasia of the usual type.                              Fibroinflammatory changes consistent with sites of prior biopsy (2 prior biopsy                                    sites).                 Ancillary studies: ER, NV, and HER-2/yoselin studies performed on previous biopsy                       specimen (see [id number]), presumably on the central/inferior tumor.  ER, NV,                       and HER-2/yoselin testing will be performed on the lower inner quadrant tumor with                       results to be reported in an addendum.      Assessment/Plan   1.  Multifocal right breast cancer T1 BN(ic)-isolated tumor cells medial tumor being grade 3 ER/NV negative HER-2 3+ lateral tumor being grade 1 ER/NV positive HER-2 2+ negative fish  2.  Positive family history of multiple malignancies genetic testing pending    Plan  1.  Echocardiogram  2.  Port placement  3.  Oncotype testing of her hormone positive tumor and HER-2 testing on the isolated tumor cells  4.  Chemotherapy education with tentative plans for weekly Taxol Herceptin for 12 weeks followed by Herceptin for a year although if the isolated tumor cells are found to be HER-2 positive consider addition of perjeta  5 .return in 4 weeks to start chemotherapy final chemotherapy protocol will be discussed after presentation at the tumor board    We discussed whether or not radiation would be indicated and except for the fact that there is a positive margin with high-grade DCIS normally she would not need radiation but I recommended a consultation because of the margins and the high grade nature of the DCIS.    We also discussed thePAXMAN system for hair loss and she is interested and would like to know the cost  involved and this will be discussed when she comes in for chemotherapy education in 2 weeks    Overall but he was taken aback because she did not expect the HER-2 positivity of her tumor but adjusted well to the news and hopefully will be able to take the treatment is recommended  -

## 2018-09-07 ENCOUNTER — APPOINTMENT (OUTPATIENT)
Dept: PREADMISSION TESTING | Facility: HOSPITAL | Age: 63
End: 2018-09-07

## 2018-09-07 ENCOUNTER — APPOINTMENT (OUTPATIENT)
Dept: LAB | Facility: HOSPITAL | Age: 63
End: 2018-09-07

## 2018-09-07 ENCOUNTER — OFFICE VISIT (OUTPATIENT)
Dept: ONCOLOGY | Facility: CLINIC | Age: 63
End: 2018-09-07

## 2018-09-07 VITALS — WEIGHT: 143.3 LBS | BODY MASS INDEX: 23.88 KG/M2

## 2018-09-07 DIAGNOSIS — C50.811 MALIGNANT NEOPLASM OF OVERLAPPING SITES OF RIGHT BREAST IN FEMALE, ESTROGEN RECEPTOR POSITIVE (HCC): Primary | ICD-10-CM

## 2018-09-07 DIAGNOSIS — Z17.0 MALIGNANT NEOPLASM OF OVERLAPPING SITES OF RIGHT BREAST IN FEMALE, ESTROGEN RECEPTOR POSITIVE (HCC): Primary | ICD-10-CM

## 2018-09-07 LAB
ANION GAP SERPL CALCULATED.3IONS-SCNC: 12.5 MMOL/L
BASOPHILS # BLD AUTO: 0.04 10*3/MM3 (ref 0–0.2)
BASOPHILS NFR BLD AUTO: 0.6 % (ref 0–1.5)
BUN BLD-MCNC: 21 MG/DL (ref 8–23)
BUN/CREAT SERPL: 24.1 (ref 7–25)
CALCIUM SPEC-SCNC: 10.2 MG/DL (ref 8.6–10.5)
CHLORIDE SERPL-SCNC: 97 MMOL/L (ref 98–107)
CO2 SERPL-SCNC: 29.5 MMOL/L (ref 22–29)
CREAT BLD-MCNC: 0.87 MG/DL (ref 0.57–1)
DEPRECATED RDW RBC AUTO: 42.8 FL (ref 37–54)
EOSINOPHIL # BLD AUTO: 0.14 10*3/MM3 (ref 0–0.7)
EOSINOPHIL NFR BLD AUTO: 2 % (ref 0.3–6.2)
ERYTHROCYTE [DISTWIDTH] IN BLOOD BY AUTOMATED COUNT: 14.8 % (ref 11.7–13)
GFR SERPL CREATININE-BSD FRML MDRD: 66 ML/MIN/1.73
GFR SERPL CREATININE-BSD FRML MDRD: 80 ML/MIN/1.73
GLUCOSE BLD-MCNC: 89 MG/DL (ref 65–99)
HCT VFR BLD AUTO: 38.4 % (ref 35.6–45.5)
HGB BLD-MCNC: 12.1 G/DL (ref 11.9–15.5)
IMM GRANULOCYTES # BLD: 0.01 10*3/MM3 (ref 0–0.03)
IMM GRANULOCYTES NFR BLD: 0.1 % (ref 0–0.5)
LYMPHOCYTES # BLD AUTO: 2.36 10*3/MM3 (ref 0.9–4.8)
LYMPHOCYTES NFR BLD AUTO: 34.2 % (ref 19.6–45.3)
MCH RBC QN AUTO: 24.9 PG (ref 26.9–32)
MCHC RBC AUTO-ENTMCNC: 31.5 G/DL (ref 32.4–36.3)
MCV RBC AUTO: 79 FL (ref 80.5–98.2)
MONOCYTES # BLD AUTO: 0.39 10*3/MM3 (ref 0.2–1.2)
MONOCYTES NFR BLD AUTO: 5.7 % (ref 5–12)
NEUTROPHILS # BLD AUTO: 3.97 10*3/MM3 (ref 1.9–8.1)
NEUTROPHILS NFR BLD AUTO: 57.5 % (ref 42.7–76)
PLATELET # BLD AUTO: 340 10*3/MM3 (ref 140–500)
PMV BLD AUTO: 9.7 FL (ref 6–12)
POTASSIUM BLD-SCNC: 4.1 MMOL/L (ref 3.5–5.2)
RBC # BLD AUTO: 4.86 10*6/MM3 (ref 3.9–5.2)
SODIUM BLD-SCNC: 139 MMOL/L (ref 136–145)
WBC NRBC COR # BLD: 6.9 10*3/MM3 (ref 4.5–10.7)

## 2018-09-07 PROCEDURE — 99215 OFFICE O/P EST HI 40 MIN: CPT | Performed by: NURSE PRACTITIONER

## 2018-09-07 PROCEDURE — 85025 COMPLETE CBC W/AUTO DIFF WBC: CPT | Performed by: SURGERY

## 2018-09-07 PROCEDURE — 36415 COLL VENOUS BLD VENIPUNCTURE: CPT

## 2018-09-07 PROCEDURE — 80048 BASIC METABOLIC PNL TOTAL CA: CPT | Performed by: SURGERY

## 2018-09-07 RX ORDER — ACETAMINOPHEN 500 MG
500 TABLET ORAL EVERY 6 HOURS PRN
COMMUNITY
End: 2018-09-14 | Stop reason: HOSPADM

## 2018-09-07 RX ORDER — ONDANSETRON HYDROCHLORIDE 8 MG/1
8 TABLET, FILM COATED ORAL EVERY 8 HOURS PRN
Qty: 30 TABLET | Refills: 2 | Status: SHIPPED | OUTPATIENT
Start: 2018-09-07 | End: 2019-03-29

## 2018-09-07 NOTE — PROGRESS NOTES
Subjective     PATIENT NAME:  Carly Mendoza  YOB: 1955  PATIENTS AGE:  62 y.o.  PATIENTS SEX:  female  DATE OF SERVICE:  09/07/2018  PROVIDER:  JESSICA An      ____________________PATIENT EDUCATION____________________    PATIENT EDUCATION:  Today I met with the patient to discuss the chemotherapy regimen recommended for treatment of her disease.  The patient was given explanation of treatment premed side effects including office policy that prohibits patients to drive if sedating medications are administered, MD explanation given regarding benefits, side effects, toxicities and goals of treatment.  The patient received a Chemotherapy/Biotherapy Plan Summary including diagnosis and specific treatment plan.    SIDE EFFECTS:  Common side effects were discussed with the patient.  Discussion included hair loss/discoloration, anemia/fatigue, infection/chills/fever, appetite, bleeding risk/precautions, constipation, diarrhea, mouth sores, taste alteration, loss of appetite,nausea/vomiting, peripheral neuropathy, skin/nail changes, rash, muscle aches/weakness, photosensitivity, weight gain/loss, hearing loss, dizziness, menopausal symptoms, menstrrual irregularity, sterility, high blood pressure, heart damage, liver damage, lung damage, kidney damage, DVT/PE risk, fluid retention, pleural/pericardial effusion, somnolence, electrolyte/LFT imbalance, vein exercises and/or the possible need for vascular access/port placement.  The patient was advice that although uncommon, leakage of an infused medication from the vein or venous access device (port) may lead to skin breakdown and/or other tissue damage.  The patient was advised that he/she may have pain, bleeding, and/or bruising from the insertion of a needle in their vein or venous access device (port).  The patient was further advised that, in spite of proper technique, infection with redness and irritation may rarely occur at the site  where the needle was inserted.  The patient was advised that if complications occur, additional medical treatment is available.    Discussion also included side effects specific to drugs in the treatment plan, Taxol and Herceptin specifically.    The patient was also educated about the [employer] and filled out the enrollment.    A total of 60  minutes were spent with the patient, with 100% of time spent in education and counseling.

## 2018-09-07 NOTE — DISCHARGE INSTRUCTIONS
Take the following medications the morning of surgery with a small sip of water:        General Instructions:  • Do not eat solid food after midnight the night before surgery.  • You may drink clear liquids day of surgery but must stop at least one hour before your hospital arrival time.  • It is beneficial for you to have a clear drink that contains carbohydrates the day of surgery.  We suggest a 12 to 20 ounce bottle of Gatorade or Powerade for non-diabetic patients or a 12 to 20 ounce bottle of G2 or Powerade Zero for diabetic patients. (Pediatric patients, are not advised to drink a 12 to 20 ounce carbohydrate drink)    Clear liquids are liquids you can see through.  Nothing red in color.     Plain water                               Sports drinks  Sodas                                   Gelatin (Jell-O)  Fruit juices without pulp such as white grape juice and apple juice  Popsicles that contain no fruit or yogurt  Tea or coffee (no cream or milk added)  Gatorade / Powerade  G2 / Powerade Zero    • Infants may have breast milk up to four hours before surgery.  • Infants drinking formula may drink formula up to six hours before surgery.   • Patients who avoid smoking, chewing tobacco and alcohol for 4 weeks prior to surgery have a reduced risk of post-operative complications.  Quit smoking as many days before surgery as you can.  • Do not smoke, use chewing tobacco or drink alcohol the day of surgery.   • If applicable bring your C-PAP/ BI-PAP machine.  • Bring any papers given to you in the doctor’s office.  • Wear clean comfortable clothes and socks.  • Do not wear contact lenses or make-up.  Bring a case for your glasses.   • Bring crutches or walker if applicable.  • Remove all piercings.  Leave jewelry and any other valuables at home.  • Hair extensions with metal clips must be removed prior to surgery.  • The Pre-Admission Testing nurse will instruct you to bring medications if unable to obtain an accurate  list in Pre-Admission Testing.        If you were given a blood bank ID arm band remember to bring it with you the day of surgery.    Preventing a Surgical Site Infection:  • For 2 to 3 days before surgery, avoid shaving with a razor because the razor can irritate skin and make it easier to develop an infection.    • Any areas of open skin can increase the risk of a post-operative wound infection by allowing bacteria to enter and travel throughout the body.  Notify your surgeon if you have any skin wounds / rashes even if it is not near the expected surgical site.  The area will need assessed to determine if surgery should be delayed until it is healed.  • The night prior to surgery sleep in a clean bed with clean clothing.  Do not allow pets to sleep with you.  • Shower on the morning of surgery using a fresh bar of anti-bacterial soap (such as Dial) and clean washcloth.  Dry with a clean towel and dress in clean clothing.  • Ask your surgeon if you will be receiving antibiotics prior to surgery.  • Make sure you, your family, and all healthcare providers clean their hands with soap and water or an alcohol based hand  before caring for you or your wound.    Day of surgery:9/14/18   1200  Upon arrival, a Pre-op nurse and Anesthesiologist will review your health history, obtain vital signs, and answer questions you may have.  The only belongings needed at this time will be your home medications and if applicable your C-PAP/BI-PAP machine.  If you are staying overnight your family can leave the rest of your belongings in the car and bring them to your room later.  A Pre-op nurse will start an IV and you may receive medication in preparation for surgery, including something to help you relax.  Your family will be able to see you in the Pre-op area.  While you are in surgery your family should notify the waiting room  if they leave the waiting room area and provide a contact phone number.    Please  be aware that surgery does come with discomfort.  We want to make every effort to control your discomfort so please discuss any uncontrolled symptoms with your nurse.   Your doctor will most likely have prescribed pain medications.      If you are going home after surgery you will receive individualized written care instructions before being discharged.  A responsible adult must drive you to and from the hospital on the day of your surgery and stay with you for 24 hours.    If you are staying overnight following surgery, you will be transported to your hospital room following the recovery period.  [facility] has all private rooms.    You have received a list of surgical assistants for your reference.  If you have any questions please call Pre-Admission Testing at [phone].  Deductibles and co-payments are collected on the day of service. Please be prepared to pay the required co-pay, deductible or deposit on the day of service as defined by your plan.

## 2018-09-14 ENCOUNTER — ANESTHESIA (OUTPATIENT)
Dept: PERIOP | Facility: HOSPITAL | Age: 63
End: 2018-09-14

## 2018-09-14 ENCOUNTER — HOSPITAL ENCOUNTER (OUTPATIENT)
Facility: HOSPITAL | Age: 63
Setting detail: HOSPITAL OUTPATIENT SURGERY
Discharge: HOME | End: 2018-09-14
Attending: SURGERY | Admitting: SURGERY

## 2018-09-14 ENCOUNTER — ANESTHESIA EVENT (OUTPATIENT)
Dept: PERIOP | Facility: HOSPITAL | Age: 63
End: 2018-09-14

## 2018-09-14 ENCOUNTER — HOSPITAL ENCOUNTER (OUTPATIENT)
Dept: CARDIOLOGY | Facility: HOSPITAL | Age: 63
Discharge: HOME | End: 2018-09-14
Attending: INTERNAL MEDICINE

## 2018-09-14 ENCOUNTER — APPOINTMENT (OUTPATIENT)
Dept: GENERAL RADIOLOGY | Facility: HOSPITAL | Age: 63
End: 2018-09-14

## 2018-09-14 VITALS
HEIGHT: 65 IN | WEIGHT: 143 LBS | HEART RATE: 77 BPM | OXYGEN SATURATION: 98 % | BODY MASS INDEX: 23.82 KG/M2 | DIASTOLIC BLOOD PRESSURE: 70 MMHG | SYSTOLIC BLOOD PRESSURE: 128 MMHG

## 2018-09-14 VITALS
SYSTOLIC BLOOD PRESSURE: 132 MMHG | DIASTOLIC BLOOD PRESSURE: 89 MMHG | OXYGEN SATURATION: 97 % | WEIGHT: 141 LBS | HEART RATE: 82 BPM | HEIGHT: 64 IN | TEMPERATURE: 97.5 F | RESPIRATION RATE: 18 BRPM | BODY MASS INDEX: 24.07 KG/M2

## 2018-09-14 DIAGNOSIS — C50.811 MALIGNANT NEOPLASM OF OVERLAPPING SITES OF RIGHT BREAST IN FEMALE, ESTROGEN RECEPTOR POSITIVE (HCC): ICD-10-CM

## 2018-09-14 DIAGNOSIS — Z17.0 MALIGNANT NEOPLASM OF OVERLAPPING SITES OF RIGHT BREAST IN FEMALE, ESTROGEN RECEPTOR POSITIVE (HCC): ICD-10-CM

## 2018-09-14 LAB
BH CV ECHO MEAS - AO MAX PG (FULL): 2 MMHG
BH CV ECHO MEAS - AO MAX PG: 4.8 MMHG
BH CV ECHO MEAS - AO MEAN PG (FULL): 1.2 MMHG
BH CV ECHO MEAS - AO MEAN PG: 2.8 MMHG
BH CV ECHO MEAS - AO V2 MAX: 109.9 CM/SEC
BH CV ECHO MEAS - AO V2 MEAN: 78.8 CM/SEC
BH CV ECHO MEAS - AO V2 VTI: 22.5 CM
BH CV ECHO MEAS - BSA(HAYCOCK): 1.7 M^2
BH CV ECHO MEAS - BSA: 1.7 M^2
BH CV ECHO MEAS - BZI_BMI: 24.5 KILOGRAMS/M^2
BH CV ECHO MEAS - BZI_METRIC_HEIGHT: 162.6 CM
BH CV ECHO MEAS - BZI_METRIC_WEIGHT: 64.9 KG
BH CV ECHO MEAS - CONTRAST EF (2CH): 61 CM2
BH CV ECHO MEAS - CONTRAST EF 4CH: 62 CM2
BH CV ECHO MEAS - EDV(TEICH): 45.5 ML
BH CV ECHO MEAS - EF(CUBED): 78.6 %
BH CV ECHO MEAS - EF(MOD-BP): 61 %
BH CV ECHO MEAS - EF(TEICH): 72.1 %
BH CV ECHO MEAS - ESV(TEICH): 12.7 ML
BH CV ECHO MEAS - FS: 40.2 %
BH CV ECHO MEAS - IVS/LVPW: 1
BH CV ECHO MEAS - IVSD: 1 CM
BH CV ECHO MEAS - LAT PEAK E' VEL: 6 CM/SEC
BH CV ECHO MEAS - LV MASS(C)D: 96.2 GRAMS
BH CV ECHO MEAS - LV MASS(C)DI: 56.7 GRAMS/M^2
BH CV ECHO MEAS - LV MAX PG: 2.8 MMHG
BH CV ECHO MEAS - LV MEAN PG: 1.7 MMHG
BH CV ECHO MEAS - LV V1 MAX: 84.4 CM/SEC
BH CV ECHO MEAS - LV V1 MEAN: 59.7 CM/SEC
BH CV ECHO MEAS - LV V1 VTI: 16.2 CM
BH CV ECHO MEAS - LVIDD: 3.3 CM
BH CV ECHO MEAS - LVIDS: 2 CM
BH CV ECHO MEAS - LVPWD: 1 CM
BH CV ECHO MEAS - MED PEAK E' VEL: 7 CM/SEC
BH CV ECHO MEAS - MV A DUR: 0.13 SEC
BH CV ECHO MEAS - MV A MAX VEL: 84.4 CM/SEC
BH CV ECHO MEAS - MV DEC SLOPE: 230.3 CM/SEC^2
BH CV ECHO MEAS - MV DEC TIME: 0.21 SEC
BH CV ECHO MEAS - MV E MAX VEL: 49.5 CM/SEC
BH CV ECHO MEAS - MV E/A: 0.59
BH CV ECHO MEAS - MV MAX PG: 3.3 MMHG
BH CV ECHO MEAS - MV MEAN PG: 1.8 MMHG
BH CV ECHO MEAS - MV P1/2T MAX VEL: 50 CM/SEC
BH CV ECHO MEAS - MV P1/2T: 63.5 MSEC
BH CV ECHO MEAS - MV V2 MAX: 91.2 CM/SEC
BH CV ECHO MEAS - MV V2 MEAN: 63.9 CM/SEC
BH CV ECHO MEAS - MV V2 VTI: 15.8 CM
BH CV ECHO MEAS - MVA P1/2T LCG: 4.4 CM^2
BH CV ECHO MEAS - MVA(P1/2T): 3.5 CM^2
BH CV ECHO MEAS - PULM A REVS DUR: 0.1 SEC
BH CV ECHO MEAS - PULM A REVS VEL: 50.9 CM/SEC
BH CV ECHO MEAS - PULM DIAS VEL: 44.6 CM/SEC
BH CV ECHO MEAS - PULM S/D: 1.4
BH CV ECHO MEAS - PULM SYS VEL: 61.6 CM/SEC
BH CV ECHO MEAS - SI(CUBED): 17.3 ML/M^2
BH CV ECHO MEAS - SI(TEICH): 19.3 ML/M^2
BH CV ECHO MEAS - SUP REN AO DIAM: 1.8 CM
BH CV ECHO MEAS - SV(CUBED): 29.3 ML
BH CV ECHO MEAS - SV(TEICH): 32.8 ML
BH CV ECHO MEASUREMENTS AVERAGE E/E' RATIO: 7.62
LEFT ATRIUM VOLUME INDEX: 16 ML/M2
LEFT ATRIUM VOLUME: 28 CM3
LV EF 2D ECHO EST: 60 %
MAXIMAL PREDICTED HEART RATE: 159 BPM
STRESS TARGET HR: 135 BPM

## 2018-09-14 PROCEDURE — 25010000002 MIDAZOLAM PER 1 MG: Performed by: ANESTHESIOLOGY

## 2018-09-14 PROCEDURE — 36561 INSERT TUNNELED CV CATH: CPT | Performed by: SURGERY

## 2018-09-14 PROCEDURE — 25010000002 PROPOFOL 10 MG/ML EMULSION: Performed by: NURSE ANESTHETIST, CERTIFIED REGISTERED

## 2018-09-14 PROCEDURE — C1788 PORT, INDWELLING, IMP: HCPCS | Performed by: SURGERY

## 2018-09-14 PROCEDURE — 93306 TTE W/DOPPLER COMPLETE: CPT | Performed by: INTERNAL MEDICINE

## 2018-09-14 PROCEDURE — 25010000002 FENTANYL CITRATE (PF) 100 MCG/2ML SOLUTION: Performed by: NURSE ANESTHETIST, CERTIFIED REGISTERED

## 2018-09-14 PROCEDURE — 25010000002 DEXAMETHASONE PER 1 MG: Performed by: NURSE ANESTHETIST, CERTIFIED REGISTERED

## 2018-09-14 PROCEDURE — 77001 FLUOROGUIDE FOR VEIN DEVICE: CPT | Performed by: SURGERY

## 2018-09-14 PROCEDURE — 25010000002 PROMETHAZINE PER 50 MG: Performed by: NURSE ANESTHETIST, CERTIFIED REGISTERED

## 2018-09-14 PROCEDURE — 25010000002 HEPARIN (PORCINE) PER 1000 UNITS: Performed by: SURGERY

## 2018-09-14 PROCEDURE — 77001 FLUOROGUIDE FOR VEIN DEVICE: CPT

## 2018-09-14 PROCEDURE — 25010000002 ONDANSETRON PER 1 MG: Performed by: NURSE ANESTHETIST, CERTIFIED REGISTERED

## 2018-09-14 PROCEDURE — 25010000002 PERFLUTREN (DEFINITY) 8.476 MG IN SODIUM CHLORIDE 0.9 % 10 ML INJECTION: Performed by: INTERNAL MEDICINE

## 2018-09-14 PROCEDURE — 93306 TTE W/DOPPLER COMPLETE: CPT

## 2018-09-14 PROCEDURE — 25010000003 CEFAZOLIN IN DEXTROSE 2-4 GM/100ML-% SOLUTION: Performed by: SURGERY

## 2018-09-14 DEVICE — POWERPORT CLEARVUE IMPLANTABLE PORT WITH ATTACHABLE 8F POLYURETHANE OPEN-ENDED SINGLE-LUMEN VENOUS CATHETER INTERMEDIATE KIT
Type: IMPLANTABLE DEVICE | Site: SUBCLAVIAN | Status: FUNCTIONAL
Brand: POWERPORT CLEARVUE

## 2018-09-14 RX ORDER — CEFAZOLIN SODIUM 2 G/100ML
2 INJECTION, SOLUTION INTRAVENOUS ONCE
Status: COMPLETED | OUTPATIENT
Start: 2018-09-14 | End: 2018-09-14

## 2018-09-14 RX ORDER — FLUMAZENIL 0.1 MG/ML
0.2 INJECTION INTRAVENOUS AS NEEDED
Status: DISCONTINUED | OUTPATIENT
Start: 2018-09-14 | End: 2018-09-14 | Stop reason: HOSPADM

## 2018-09-14 RX ORDER — BUPIVACAINE HYDROCHLORIDE 2.5 MG/ML
INJECTION, SOLUTION EPIDURAL; INFILTRATION; INTRACAUDAL AS NEEDED
Status: DISCONTINUED | OUTPATIENT
Start: 2018-09-14 | End: 2018-09-14 | Stop reason: HOSPADM

## 2018-09-14 RX ORDER — ONDANSETRON 2 MG/ML
4 INJECTION INTRAMUSCULAR; INTRAVENOUS ONCE AS NEEDED
Status: COMPLETED | OUTPATIENT
Start: 2018-09-14 | End: 2018-09-14

## 2018-09-14 RX ORDER — PROMETHAZINE HYDROCHLORIDE 25 MG/1
12.5 TABLET ORAL ONCE AS NEEDED
Status: DISCONTINUED | OUTPATIENT
Start: 2018-09-14 | End: 2018-09-14 | Stop reason: HOSPADM

## 2018-09-14 RX ORDER — PROMETHAZINE HYDROCHLORIDE 25 MG/ML
12.5 INJECTION, SOLUTION INTRAMUSCULAR; INTRAVENOUS ONCE AS NEEDED
Status: COMPLETED | OUTPATIENT
Start: 2018-09-14 | End: 2018-09-14

## 2018-09-14 RX ORDER — SODIUM CHLORIDE 0.9 % (FLUSH) 0.9 %
1-10 SYRINGE (ML) INJECTION AS NEEDED
Status: DISCONTINUED | OUTPATIENT
Start: 2018-09-14 | End: 2018-09-14 | Stop reason: HOSPADM

## 2018-09-14 RX ORDER — PROPOFOL 10 MG/ML
VIAL (ML) INTRAVENOUS AS NEEDED
Status: DISCONTINUED | OUTPATIENT
Start: 2018-09-14 | End: 2018-09-14 | Stop reason: SURG

## 2018-09-14 RX ORDER — RANITIDINE 150 MG/1
150 TABLET ORAL DAILY
COMMUNITY
End: 2019-12-06

## 2018-09-14 RX ORDER — ONDANSETRON 2 MG/ML
INJECTION INTRAMUSCULAR; INTRAVENOUS AS NEEDED
Status: DISCONTINUED | OUTPATIENT
Start: 2018-09-14 | End: 2018-09-14 | Stop reason: SURG

## 2018-09-14 RX ORDER — DIPHENHYDRAMINE HYDROCHLORIDE 50 MG/ML
12.5 INJECTION INTRAMUSCULAR; INTRAVENOUS
Status: DISCONTINUED | OUTPATIENT
Start: 2018-09-14 | End: 2018-09-14 | Stop reason: HOSPADM

## 2018-09-14 RX ORDER — MIDAZOLAM HYDROCHLORIDE 1 MG/ML
2 INJECTION INTRAMUSCULAR; INTRAVENOUS
Status: DISCONTINUED | OUTPATIENT
Start: 2018-09-14 | End: 2018-09-14 | Stop reason: HOSPADM

## 2018-09-14 RX ORDER — EPHEDRINE SULFATE 50 MG/ML
5 INJECTION, SOLUTION INTRAVENOUS ONCE AS NEEDED
Status: DISCONTINUED | OUTPATIENT
Start: 2018-09-14 | End: 2018-09-14 | Stop reason: HOSPADM

## 2018-09-14 RX ORDER — FAMOTIDINE 10 MG/ML
20 INJECTION, SOLUTION INTRAVENOUS ONCE
Status: COMPLETED | OUTPATIENT
Start: 2018-09-14 | End: 2018-09-14

## 2018-09-14 RX ORDER — LIDOCAINE HYDROCHLORIDE 20 MG/ML
INJECTION, SOLUTION INFILTRATION; PERINEURAL AS NEEDED
Status: DISCONTINUED | OUTPATIENT
Start: 2018-09-14 | End: 2018-09-14 | Stop reason: SURG

## 2018-09-14 RX ORDER — MIDAZOLAM HYDROCHLORIDE 1 MG/ML
1 INJECTION INTRAMUSCULAR; INTRAVENOUS
Status: DISCONTINUED | OUTPATIENT
Start: 2018-09-14 | End: 2018-09-14 | Stop reason: HOSPADM

## 2018-09-14 RX ORDER — FENTANYL CITRATE 50 UG/ML
INJECTION, SOLUTION INTRAMUSCULAR; INTRAVENOUS AS NEEDED
Status: DISCONTINUED | OUTPATIENT
Start: 2018-09-14 | End: 2018-09-14 | Stop reason: SURG

## 2018-09-14 RX ORDER — SODIUM CHLORIDE, SODIUM LACTATE, POTASSIUM CHLORIDE, CALCIUM CHLORIDE 600; 310; 30; 20 MG/100ML; MG/100ML; MG/100ML; MG/100ML
9 INJECTION, SOLUTION INTRAVENOUS CONTINUOUS
Status: DISCONTINUED | OUTPATIENT
Start: 2018-09-14 | End: 2018-09-14 | Stop reason: HOSPADM

## 2018-09-14 RX ORDER — DEXAMETHASONE SODIUM PHOSPHATE 10 MG/ML
INJECTION INTRAMUSCULAR; INTRAVENOUS AS NEEDED
Status: DISCONTINUED | OUTPATIENT
Start: 2018-09-14 | End: 2018-09-14 | Stop reason: SURG

## 2018-09-14 RX ORDER — HYDROCODONE BITARTRATE AND ACETAMINOPHEN 7.5; 325 MG/1; MG/1
1 TABLET ORAL ONCE AS NEEDED
Status: DISCONTINUED | OUTPATIENT
Start: 2018-09-14 | End: 2018-09-14 | Stop reason: HOSPADM

## 2018-09-14 RX ORDER — FENTANYL CITRATE 50 UG/ML
50 INJECTION, SOLUTION INTRAMUSCULAR; INTRAVENOUS
Status: DISCONTINUED | OUTPATIENT
Start: 2018-09-14 | End: 2018-09-14 | Stop reason: HOSPADM

## 2018-09-14 RX ORDER — LABETALOL HYDROCHLORIDE 5 MG/ML
5 INJECTION, SOLUTION INTRAVENOUS
Status: DISCONTINUED | OUTPATIENT
Start: 2018-09-14 | End: 2018-09-14 | Stop reason: HOSPADM

## 2018-09-14 RX ORDER — PROMETHAZINE HYDROCHLORIDE 25 MG/1
25 TABLET ORAL ONCE AS NEEDED
Status: COMPLETED | OUTPATIENT
Start: 2018-09-14 | End: 2018-09-14

## 2018-09-14 RX ORDER — LIDOCAINE HYDROCHLORIDE 10 MG/ML
0.5 INJECTION, SOLUTION EPIDURAL; INFILTRATION; INTRACAUDAL; PERINEURAL ONCE AS NEEDED
Status: DISCONTINUED | OUTPATIENT
Start: 2018-09-14 | End: 2018-09-14 | Stop reason: HOSPADM

## 2018-09-14 RX ORDER — MAGNESIUM HYDROXIDE 1200 MG/15ML
LIQUID ORAL AS NEEDED
Status: DISCONTINUED | OUTPATIENT
Start: 2018-09-14 | End: 2018-09-14 | Stop reason: HOSPADM

## 2018-09-14 RX ORDER — OXYCODONE AND ACETAMINOPHEN 7.5; 325 MG/1; MG/1
1 TABLET ORAL ONCE AS NEEDED
Status: DISCONTINUED | OUTPATIENT
Start: 2018-09-14 | End: 2018-09-14 | Stop reason: HOSPADM

## 2018-09-14 RX ORDER — HYDROCODONE BITARTRATE AND ACETAMINOPHEN 5; 325 MG/1; MG/1
1-2 TABLET ORAL EVERY 4 HOURS PRN
Qty: 8 TABLET | Refills: 0 | Status: SHIPPED | OUTPATIENT
Start: 2018-09-14 | End: 2019-01-25

## 2018-09-14 RX ORDER — PROMETHAZINE HYDROCHLORIDE 25 MG/1
25 SUPPOSITORY RECTAL ONCE AS NEEDED
Status: COMPLETED | OUTPATIENT
Start: 2018-09-14 | End: 2018-09-14

## 2018-09-14 RX ORDER — NALOXONE HCL 0.4 MG/ML
0.2 VIAL (ML) INJECTION AS NEEDED
Status: DISCONTINUED | OUTPATIENT
Start: 2018-09-14 | End: 2018-09-14 | Stop reason: HOSPADM

## 2018-09-14 RX ADMIN — ONDANSETRON 4 MG: 2 INJECTION INTRAMUSCULAR; INTRAVENOUS at 14:40

## 2018-09-14 RX ADMIN — SODIUM CHLORIDE, POTASSIUM CHLORIDE, SODIUM LACTATE AND CALCIUM CHLORIDE: 600; 310; 30; 20 INJECTION, SOLUTION INTRAVENOUS at 15:10

## 2018-09-14 RX ADMIN — LIDOCAINE HYDROCHLORIDE 60 MG: 20 INJECTION, SOLUTION INFILTRATION; PERINEURAL at 14:12

## 2018-09-14 RX ADMIN — FENTANYL CITRATE 25 MCG: 50 INJECTION INTRAMUSCULAR; INTRAVENOUS at 15:05

## 2018-09-14 RX ADMIN — ONDANSETRON 4 MG: 2 INJECTION INTRAMUSCULAR; INTRAVENOUS at 16:45

## 2018-09-14 RX ADMIN — FENTANYL CITRATE 50 MCG: 50 INJECTION INTRAMUSCULAR; INTRAVENOUS at 15:45

## 2018-09-14 RX ADMIN — PROMETHAZINE HYDROCHLORIDE 12.5 MG: 25 INJECTION INTRAMUSCULAR; INTRAVENOUS at 17:19

## 2018-09-14 RX ADMIN — CEFAZOLIN SODIUM 2 G: 2 INJECTION, SOLUTION INTRAVENOUS at 14:11

## 2018-09-14 RX ADMIN — PERFLUTREN 1.5 ML: 6.52 INJECTION, SUSPENSION INTRAVENOUS at 09:39

## 2018-09-14 RX ADMIN — SODIUM CHLORIDE, POTASSIUM CHLORIDE, SODIUM LACTATE AND CALCIUM CHLORIDE: 600; 310; 30; 20 INJECTION, SOLUTION INTRAVENOUS at 14:12

## 2018-09-14 RX ADMIN — FENTANYL CITRATE 25 MCG: 50 INJECTION INTRAMUSCULAR; INTRAVENOUS at 14:55

## 2018-09-14 RX ADMIN — FENTANYL CITRATE 25 MCG: 50 INJECTION INTRAMUSCULAR; INTRAVENOUS at 14:12

## 2018-09-14 RX ADMIN — PROPOFOL 200 MG: 10 INJECTION, EMULSION INTRAVENOUS at 14:12

## 2018-09-14 RX ADMIN — MIDAZOLAM HYDROCHLORIDE 2 MG: 2 INJECTION, SOLUTION INTRAMUSCULAR; INTRAVENOUS at 12:33

## 2018-09-14 RX ADMIN — FENTANYL CITRATE 25 MCG: 50 INJECTION INTRAMUSCULAR; INTRAVENOUS at 14:40

## 2018-09-14 RX ADMIN — FAMOTIDINE 20 MG: 10 INJECTION, SOLUTION INTRAVENOUS at 12:33

## 2018-09-14 RX ADMIN — SODIUM CHLORIDE, POTASSIUM CHLORIDE, SODIUM LACTATE AND CALCIUM CHLORIDE 9 ML/HR: 600; 310; 30; 20 INJECTION, SOLUTION INTRAVENOUS at 12:33

## 2018-09-14 RX ADMIN — DEXAMETHASONE SODIUM PHOSPHATE 6 MG: 10 INJECTION INTRAMUSCULAR; INTRAVENOUS at 14:40

## 2018-09-14 NOTE — PERIOPERATIVE NURSING NOTE
PT STATES HAD BILAT MASTECTOMY IN AUG 2018, AS WELL AS SENTINEL LYMPH NODE REMOVAL ON RIGHT.  PT STATES WAS INSTRUCTED PER DR. LEON THAT SHE COULD HAVE BP AND IV STICKS ON RIGHT ARM.  SURGEON TOOK TWO OTHER LYMPH NODES ON THE RIGHT WITH THE SENTINEL, BUT GAVE PT NO RESTRICTION ON USE OF THIS ARM

## 2018-09-14 NOTE — ANESTHESIA PREPROCEDURE EVALUATION
Anesthesia Evaluation     Patient summary reviewed   no history of anesthetic complications:  NPO Solid Status: > 8 hours  NPO Liquid Status: > 2 hours           Airway   Mallampati: I  TM distance: >3 FB  Neck ROM: full  no difficulty expected  Dental      Pulmonary     breath sounds clear to auscultation  (-) shortness of breath, not a smoker  Cardiovascular   Exercise tolerance: good (4-7 METS)    Rhythm: regular  Rate: normal    (+) hypertension,   (-) angina, HER      Neuro/Psych  GI/Hepatic/Renal/Endo    (+)  GERD well controlled,      Musculoskeletal     Abdominal    Substance History      OB/GYN          Other      history of cancer                    Anesthesia Plan    ASA 2     MAC     intravenous induction   Anesthetic plan, all risks, benefits, and alternatives have been provided, discussed and informed consent has been obtained with: patient.

## 2018-09-14 NOTE — H&P
History of Present Illness:  In the interim,Carly Mendoza has had the following procedure and resultant pathology report: She is undergone bilateral mastectomies with a right sentinel lymph node biopsy.  The path report reveals extensive DCIS of the right breast with 2 foci of invasive cancer measuring 1 and 0.8 cm respectively.  The extreme inferior medial aspect of the specimen was positive focally for DCIS.     She has noted no redness, warmth,drainage, swelling at the incision site. Denies fever or chills.   The patient has seen the medical oncologist who recommended chemotherapy.  She needs a MediPort to do that.     Current Outpatient Prescriptions:   •  cephalexin (KEFLEX) 500 MG capsule, Take 1 capsule by mouth Every 8 (Eight) Hours, Disp: 40 capsule, Rfl: 0  •  chlorthalidone (HYGROTON) 25 MG tablet, Take 25 mg by mouth Daily., Disp: , Rfl:   •  fexofenadine-pseudoephedrine (ALLEGRA-D 24) 180-240 MG per 24 hr tablet, Take 1 tablet by mouth Daily., Disp: , Rfl:   •  omeprazole-sodium bicarbonate (ZEGERID)  MG per capsule, Take 1 capsule by mouth Every Morning Before Breakfast., Disp: , Rfl:   •  oxyCODONE-acetaminophen (PERCOCET) 5-325 MG per tablet, Take 1-2 tablets by mouth Every 4 (Four) Hours As Needed (Pain) for up to 20 doses., Disp: 20 tablet, Rfl: 0  •  Turmeric 500 MG tablet, Take 1,000 mg by mouth Daily., Disp: , Rfl:   Physical examination  Chest-she has undergone bilateral mastectomies with immediate reconstruction.  The incisions both look fine.  She did have a nipple sparing mastectomy on the left side and the nipple areolar complex is viable  Assessment:  Right breast cancer     Plan-we will place a left sided MediPort today.  The patient understands the risk of infection, bleeding, anesthesia, and pneumothorax.   : Yes

## 2018-09-14 NOTE — ANESTHESIA POSTPROCEDURE EVALUATION
Patient: Carly Mendoza    Procedure Summary     Date:  09/14/18 Room / Location:  [facility] OR 06 / [facility] MAIN OR    Anesthesia Start:  1408 Anesthesia Stop:  1517    Procedure:  INSERTION VENOUS ACCESS DEVICE (Left ) Diagnosis:       Malignant neoplasm of overlapping sites of right breast in female, estrogen receptor positive (CMS/HCC)      (Malignant neoplasm of overlapping sites of right breast in female, estrogen receptor positive (CMS/HCC) [C50.811, Z17.0])    Surgeon:  Kwaku Gudino MD Provider:  Villa Murphy MD    Anesthesia Type:  MAC ASA Status:  2          Anesthesia Type: MAC  Last vitals  BP   132/89 (09/14/18 1735)   Temp   36.4 °C (97.5 °F) (09/14/18 1515)   Pulse   82 (09/14/18 1735)   Resp   18 (09/14/18 1735)     SpO2   97 % (09/14/18 1735)     Post Anesthesia Care and Evaluation    Patient location during evaluation: PHASE II  Level of consciousness: awake and alert  Anesthetic complications: No anesthetic complications

## 2018-09-14 NOTE — OP NOTE
Name: Carly Mendoza  Age: 62 y.o.  Sex: female  :  1955  MRN: [id number]    Pre- op Dx: poor IV access, right breast cancer    Postop DX:  Same    Procedure: Insertion of Central Venous Port    Surgeon : Kwaku Gudino MD    Indications:  vascular access for administration of chemotherapy.    Procedure Details   Informed consent was obtained for the procedure, including sedation.  Risks of lung perforation, hemorrhage, arrhythmia, and adverse drug reaction were discussed.     Sterile technique was used.     Under sterile conditions the neck and chest were prepped and covered with a sterile drape. Local anesthesia was applied to the skin and subcutaneous tissues.  A needle was then inserted into the left  subclavian vein.   A guide wire was then passed easily through the catheter. There were no arrhythmias. A dilator and sheath were advanced over the wire and the wire was removed. The catheter was advanced through the sheath and the sheath was removed.  Fluoroscopy was used to position the tip in the superior vena cava. There was no evidence of a pneumothorax.   A pocket was developed on the anterior chest wall.The catheter was trimmed to the appropriate length.  The catheter was attached to the port in the usual fashion.  The port was anchored to the fascia with prolene and the skin was closed with vicryl.  Steri-Strips and or Dermabond was applied.  Tegaderm was applied    Findings:  There were no changes to vital signs. The port was flushed with heparin. Patient did tolerate procedure well.    EBL:    minimal    Condition:   good

## 2018-09-14 NOTE — ANESTHESIA PROCEDURE NOTES
Airway  Urgency: elective    Airway not difficult    General Information and Staff    Patient location during procedure: OR  Anesthesiologist: ROSS ROBERTSON  CRNA: BUD CHOWDARY    Indications and Patient Condition  Indications for airway management: airway protection    Preoxygenated: yes  MILS not maintained throughout  Mask difficulty assessment: 1 - vent by mask    Final Airway Details  Final airway type: supraglottic airway      Successful airway: classic  Size 4    Number of attempts at approach: 1    Additional Comments  Preoxygenation FEO2 >85, SIVI, LMA placed with ease, teeth/lips as preop. Secured and placement confirmed.

## 2018-09-20 PROBLEM — Z45.2 FITTING AND ADJUSTMENT OF VASCULAR CATHETER: Status: ACTIVE | Noted: 2018-09-20

## 2018-09-20 RX ORDER — SODIUM CHLORIDE 0.9 % (FLUSH) 0.9 %
10 SYRINGE (ML) INJECTION AS NEEDED
Status: CANCELLED | OUTPATIENT
Start: 2018-09-21

## 2018-09-21 ENCOUNTER — INFUSION (OUTPATIENT)
Dept: ONCOLOGY | Facility: HOSPITAL | Age: 63
End: 2018-09-21

## 2018-09-21 ENCOUNTER — OFFICE VISIT (OUTPATIENT)
Dept: ONCOLOGY | Facility: CLINIC | Age: 63
End: 2018-09-21

## 2018-09-21 VITALS
RESPIRATION RATE: 18 BRPM | OXYGEN SATURATION: 99 % | DIASTOLIC BLOOD PRESSURE: 88 MMHG | TEMPERATURE: 98 F | HEART RATE: 88 BPM | WEIGHT: 142.2 LBS | HEIGHT: 65 IN | BODY MASS INDEX: 23.69 KG/M2 | SYSTOLIC BLOOD PRESSURE: 126 MMHG

## 2018-09-21 VITALS — SYSTOLIC BLOOD PRESSURE: 111 MMHG | DIASTOLIC BLOOD PRESSURE: 82 MMHG | HEART RATE: 99 BPM

## 2018-09-21 DIAGNOSIS — C50.811 MALIGNANT NEOPLASM OF OVERLAPPING SITES OF RIGHT BREAST IN FEMALE, ESTROGEN RECEPTOR NEGATIVE (HCC): Primary | ICD-10-CM

## 2018-09-21 DIAGNOSIS — C50.811 MALIGNANT NEOPLASM OF OVERLAPPING SITES OF RIGHT BREAST IN FEMALE, ESTROGEN RECEPTOR NEGATIVE (HCC): ICD-10-CM

## 2018-09-21 DIAGNOSIS — Z17.0 MALIGNANT NEOPLASM OF OVERLAPPING SITES OF RIGHT BREAST IN FEMALE, ESTROGEN RECEPTOR POSITIVE (HCC): ICD-10-CM

## 2018-09-21 DIAGNOSIS — Z17.1 MALIGNANT NEOPLASM OF OVERLAPPING SITES OF RIGHT BREAST IN FEMALE, ESTROGEN RECEPTOR NEGATIVE (HCC): Primary | ICD-10-CM

## 2018-09-21 DIAGNOSIS — C50.811 MALIGNANT NEOPLASM OF OVERLAPPING SITES OF RIGHT BREAST IN FEMALE, ESTROGEN RECEPTOR POSITIVE (HCC): ICD-10-CM

## 2018-09-21 DIAGNOSIS — Z45.2 FITTING AND ADJUSTMENT OF VASCULAR CATHETER: ICD-10-CM

## 2018-09-21 DIAGNOSIS — Z17.1 MALIGNANT NEOPLASM OF OVERLAPPING SITES OF RIGHT BREAST IN FEMALE, ESTROGEN RECEPTOR NEGATIVE (HCC): ICD-10-CM

## 2018-09-21 LAB
ALBUMIN SERPL-MCNC: 4.5 G/DL (ref 3.5–5.2)
ALBUMIN/GLOB SERPL: 1.5 G/DL (ref 1.1–2.4)
ALP SERPL-CCNC: 83 U/L (ref 38–116)
ALT SERPL W P-5'-P-CCNC: 27 U/L (ref 0–33)
ANION GAP SERPL CALCULATED.3IONS-SCNC: 11.6 MMOL/L
AST SERPL-CCNC: 20 U/L (ref 0–32)
BASOPHILS # BLD AUTO: 0.06 10*3/MM3 (ref 0–0.1)
BASOPHILS NFR BLD AUTO: 0.9 % (ref 0–1.1)
BILIRUB SERPL-MCNC: 0.3 MG/DL (ref 0.1–1.2)
BUN BLD-MCNC: 23 MG/DL (ref 6–20)
BUN/CREAT SERPL: 30.3 (ref 7.3–30)
CALCIUM SPEC-SCNC: 9.8 MG/DL (ref 8.5–10.2)
CHLORIDE SERPL-SCNC: 95 MMOL/L (ref 98–107)
CO2 SERPL-SCNC: 31.4 MMOL/L (ref 22–29)
CREAT BLD-MCNC: 0.76 MG/DL (ref 0.6–1.1)
DEPRECATED RDW RBC AUTO: 42 FL (ref 37–49)
EOSINOPHIL # BLD AUTO: 0.17 10*3/MM3 (ref 0–0.36)
EOSINOPHIL NFR BLD AUTO: 2.5 % (ref 1–5)
ERYTHROCYTE [DISTWIDTH] IN BLOOD BY AUTOMATED COUNT: 14.8 % (ref 11.7–14.5)
GFR SERPL CREATININE-BSD FRML MDRD: 77 ML/MIN/1.73
GFR SERPL CREATININE-BSD FRML MDRD: 94 ML/MIN/1.73
GLOBULIN UR ELPH-MCNC: 3.1 GM/DL (ref 1.8–3.5)
GLUCOSE BLD-MCNC: 115 MG/DL (ref 74–124)
HCT VFR BLD AUTO: 37.8 % (ref 34–45)
HGB BLD-MCNC: 11.7 G/DL (ref 11.5–14.9)
IMM GRANULOCYTES # BLD: 0.02 10*3/MM3 (ref 0–0.03)
IMM GRANULOCYTES NFR BLD: 0.3 % (ref 0–0.5)
LYMPHOCYTES # BLD AUTO: 2.04 10*3/MM3 (ref 1–3.5)
LYMPHOCYTES NFR BLD AUTO: 30.4 % (ref 20–49)
MCH RBC QN AUTO: 24.5 PG (ref 27–33)
MCHC RBC AUTO-ENTMCNC: 31 G/DL (ref 32–35)
MCV RBC AUTO: 79.1 FL (ref 83–97)
MONOCYTES # BLD AUTO: 0.6 10*3/MM3 (ref 0.25–0.8)
MONOCYTES NFR BLD AUTO: 9 % (ref 4–12)
NEUTROPHILS # BLD AUTO: 3.81 10*3/MM3 (ref 1.5–7)
NEUTROPHILS NFR BLD AUTO: 56.9 % (ref 39–75)
NRBC BLD MANUAL-RTO: 0 /100 WBC (ref 0–0)
PLATELET # BLD AUTO: 360 10*3/MM3 (ref 150–375)
PMV BLD AUTO: 9.2 FL (ref 8.9–12.1)
POTASSIUM BLD-SCNC: 3.5 MMOL/L (ref 3.5–4.7)
PROT SERPL-MCNC: 7.6 G/DL (ref 6.3–8)
RBC # BLD AUTO: 4.78 10*6/MM3 (ref 3.9–5)
SODIUM BLD-SCNC: 138 MMOL/L (ref 134–145)
WBC NRBC COR # BLD: 6.7 10*3/MM3 (ref 4–10)

## 2018-09-21 PROCEDURE — 25010000002 HEPARIN FLUSH (PORCINE) 100 UNIT/ML SOLUTION: Performed by: INTERNAL MEDICINE

## 2018-09-21 PROCEDURE — 96417 CHEMO IV INFUS EACH ADDL SEQ: CPT | Performed by: INTERNAL MEDICINE

## 2018-09-21 PROCEDURE — 80053 COMPREHEN METABOLIC PANEL: CPT

## 2018-09-21 PROCEDURE — 85025 COMPLETE CBC W/AUTO DIFF WBC: CPT

## 2018-09-21 PROCEDURE — 96413 CHEMO IV INFUSION 1 HR: CPT | Performed by: INTERNAL MEDICINE

## 2018-09-21 PROCEDURE — 25010000002 TRASTUZUMAB PER 10 MG: Performed by: INTERNAL MEDICINE

## 2018-09-21 PROCEDURE — S0353 CANCER TREATMENTPLAN INITIAL: HCPCS | Performed by: INTERNAL MEDICINE

## 2018-09-21 PROCEDURE — 25010000002 PACLITAXEL PER 30 MG: Performed by: INTERNAL MEDICINE

## 2018-09-21 PROCEDURE — 25010000002 DIPHENHYDRAMINE: Performed by: INTERNAL MEDICINE

## 2018-09-21 PROCEDURE — 96415 CHEMO IV INFUSION ADDL HR: CPT | Performed by: INTERNAL MEDICINE

## 2018-09-21 PROCEDURE — 96375 TX/PRO/DX INJ NEW DRUG ADDON: CPT | Performed by: INTERNAL MEDICINE

## 2018-09-21 PROCEDURE — 25010000003 DEXAMETHASONE SODIUM PHOSPHATE 100 MG/10ML SOLUTION: Performed by: INTERNAL MEDICINE

## 2018-09-21 PROCEDURE — 99215 OFFICE O/P EST HI 40 MIN: CPT | Performed by: INTERNAL MEDICINE

## 2018-09-21 RX ORDER — SODIUM CHLORIDE 9 MG/ML
250 INJECTION, SOLUTION INTRAVENOUS ONCE
Status: CANCELLED | OUTPATIENT
Start: 2018-09-28

## 2018-09-21 RX ORDER — FAMOTIDINE 10 MG/ML
20 INJECTION, SOLUTION INTRAVENOUS ONCE
Status: CANCELLED | OUTPATIENT
Start: 2018-09-28

## 2018-09-21 RX ORDER — FAMOTIDINE 10 MG/ML
20 INJECTION, SOLUTION INTRAVENOUS ONCE
Status: COMPLETED | OUTPATIENT
Start: 2018-09-21 | End: 2018-09-21

## 2018-09-21 RX ORDER — SODIUM CHLORIDE 9 MG/ML
250 INJECTION, SOLUTION INTRAVENOUS ONCE
Status: CANCELLED | OUTPATIENT
Start: 2018-10-05

## 2018-09-21 RX ORDER — FAMOTIDINE 10 MG/ML
20 INJECTION, SOLUTION INTRAVENOUS ONCE
Status: CANCELLED | OUTPATIENT
Start: 2018-10-05

## 2018-09-21 RX ORDER — FAMOTIDINE 10 MG/ML
20 INJECTION, SOLUTION INTRAVENOUS ONCE
Status: CANCELLED | OUTPATIENT
Start: 2018-09-21

## 2018-09-21 RX ORDER — SODIUM CHLORIDE 9 MG/ML
250 INJECTION, SOLUTION INTRAVENOUS ONCE
Status: COMPLETED | OUTPATIENT
Start: 2018-09-21 | End: 2018-09-21

## 2018-09-21 RX ORDER — SODIUM CHLORIDE 0.9 % (FLUSH) 0.9 %
10 SYRINGE (ML) INJECTION AS NEEDED
Status: CANCELLED | OUTPATIENT
Start: 2018-09-21

## 2018-09-21 RX ORDER — SODIUM CHLORIDE 9 MG/ML
250 INJECTION, SOLUTION INTRAVENOUS ONCE
Status: CANCELLED | OUTPATIENT
Start: 2018-09-21

## 2018-09-21 RX ORDER — SODIUM CHLORIDE 9 MG/ML
250 INJECTION, SOLUTION INTRAVENOUS ONCE
Status: CANCELLED | OUTPATIENT
Start: 2018-10-12

## 2018-09-21 RX ORDER — SODIUM CHLORIDE 0.9 % (FLUSH) 0.9 %
10 SYRINGE (ML) INJECTION AS NEEDED
Status: DISCONTINUED | OUTPATIENT
Start: 2018-09-21 | End: 2018-09-21 | Stop reason: HOSPADM

## 2018-09-21 RX ADMIN — FAMOTIDINE 20 MG: 10 INJECTION, SOLUTION INTRAVENOUS at 09:55

## 2018-09-21 RX ADMIN — SODIUM CHLORIDE, PRESERVATIVE FREE 500 UNITS: 5 INJECTION INTRAVENOUS at 15:53

## 2018-09-21 RX ADMIN — DEXAMETHASONE SODIUM PHOSPHATE 12 MG: 10 INJECTION, SOLUTION INTRAMUSCULAR; INTRAVENOUS at 10:25

## 2018-09-21 RX ADMIN — TRASTUZUMAB 260 MG: 150 INJECTION, POWDER, LYOPHILIZED, FOR SOLUTION INTRAVENOUS at 13:14

## 2018-09-21 RX ADMIN — DIPHENHYDRAMINE HYDROCHLORIDE 50 MG: 50 INJECTION INTRAMUSCULAR; INTRAVENOUS at 10:05

## 2018-09-21 RX ADMIN — PACLITAXEL 135 MG: 6 INJECTION, SOLUTION INTRAVENOUS at 11:15

## 2018-09-21 RX ADMIN — SODIUM CHLORIDE 250 ML: 9 INJECTION, SOLUTION INTRAVENOUS at 09:53

## 2018-09-21 NOTE — PROGRESS NOTES
Kyree cap intact for 45 minutes prior to chemo infusion. Cap remained on during 2 hour infusion of Taxol and for I hour post infusion. Patient discharged with personal cap and token present. Instructed to notify MD if any signs of infection or fever of 100.4 of greater. Patient verbalized understanding.

## 2018-09-21 NOTE — PROGRESS NOTES
Subjective     REASON FOR CONSULTATION:    1.Multifocal right breast cancer post bilateral mastectomies- T1b N0+-10:00 tumor 8 mm grade 1 ER/VA positive HER-2 negative Oncotype pending    2.  5:00 tumor right breast ER/VA negative HER-2 positive  3.  Negative genetic testing  4.  Adequate echo weekly Taxol Herceptin started on 9/21/18                          Referring physician Kwaku Gudino M.D.      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  She is presented at the multidiscipline conference and everybody felt comfortable with weekly Taxol Herceptin for 12 weeks followed by Herceptin for a year and Oncotype testing on the second tumor for which she would receive an aromatase inhibitor for 5 years unless it was high risk which is very unlikely    She is here today having had an echo which showed an adequate ejection fraction  Of 61% and she had a port placed and is here to start treatment.    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    Unfortunately the wrong specimen was sent for Oncotype testing and ER negative specimen was sent and I talked to the pathologist and they will send the correct specimen for Oncotype DX testing with no talked to the patient.    That he has no questions and is happy to finally get started with therapy.    Genetic testing results are negative using the INVITAE  panel  -      Past Medical History:   Diagnosis Date   • Environmental allergies    • GERD  (gastroesophageal reflux disease)    • History of foreign travel     [place]   • Hypertension    • Malignant neoplasm of overlapping sites of right breast in female, estrogen receptor positive (CMS/HCC) 7/18/2018        Past Surgical History:   Procedure Laterality Date   • BREAST BIOPSY Right 2018    Malignant   • BREAST RECONSTRUCTION Bilateral 8/1/2018    Procedure: BILATERAL BREAST TISSUE EXPANDER INSERTION WITH ALLODERM;  Surgeon: Waterhouse, Maurine, MD;  Location: [facility] MAIN OR;  Service: Plastics   • COLONOSCOPY N/A 3/24/2017    Procedure: COLONOSCOPY TO CECUM AND TI;  Surgeon: Aleksander Bella MD;  Location: [facility] ENDOSCOPY;  Service:    • ENDOSCOPY N/A 3/24/2017    Procedure: ESOPHAGOGASTRODUODENOSCOPY;  Surgeon: Aleksander Bella MD;  Location: [facility] ENDOSCOPY;  Service:    • HYSTERECTOMY  1989   • MASTECTOMY W/ SENTINEL NODE BIOPSY Bilateral 8/1/2018    Procedure: BILATERAL TOTAL MASTECTOMY WITH RIGHT SENTINEL LYMPH MARTINEZ BIOPSY;  Surgeon: Kwaku Gudino MD;  Location: [facility] MAIN OR;  Service: General   • OOPHORECTOMY Bilateral 1989   • VENOUS ACCESS DEVICE (PORT) INSERTION Left 9/14/2018    Procedure: INSERTION VENOUS ACCESS DEVICE;  Surgeon: Kwaku Gudino MD;  Location: [facility] MAIN OR;  Service: General      ONCOLOGIC HISTORY;  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  The patient had never had a previous mammogram and was referred for evaluation at which time she was found to have a 4.2 cm mass in the upper outer quadrant of the right breast at the 9:30 position associated with pleomorphic calcifications in the lower inner quadrant also felt to be suspicious left breast was benign.  Patient underwent ultrasound guided biopsy on 6/15/18 with the biopsy at the 9:30 position with clip placement showing invasive mammary carcinoma ductal type low-grade ER % GA 9800% HER-2 2+  negative by fish.  Patient also underwent biopsy at the 4:00 position which showed a microscopic focus of invasive ductal carcinoma felt to be probably a lymphatic space involvement and was too small to do additional testing and a clip was placed.  Patient went on to have bilateral breast MRIs and was referred to Dr. Gudino MRIs confirmed 2 areas of abnormality at the breast-lower inner quadrant showed a large hematoma with a clip identified and there is extensive clumped abnormal enhancement beginning superior to the biopsy cavity and extending laterally to the entire upper half of the right breast into the upper outer quadrant measuring 1.2 x 1.4 cm and extending over 7 cm.  A biopsy clip was identified in this area the abnormal enhancement focally abutted the pectoralis muscle medially with no invasion through few borderline enlarged right axillary nodes with normal morphology no internal mammary adenopathy was noted left breast was benign.  Patient went on to have bilateral mastectomies the final path report showed 2 separate primaries in the breast associated with extensive high-grade DCIS measuring 6 cm  The lower inner quadrant lesion measured 1 cm was a grade 3 invasive ductal carcinoma ER 0 HI 0 HER-2 3+ and the lesion at the 9:30 position was a grade 1 invasive ductal carcinoma measuring 8 mm strongly ER/HI positive and HER-2 negative based on preop testing.  3 sentinel nodes were obtained 1 showed isolated tumor cells.  Margins showed  Positivity with DCIS in the inferior medial margin and was close to the deep margin (2mm) and inferior superficial margin(3mm).  Lymphovascular invasion was present.  Left breast is benign    Postoperatively she's done well and had her expanders filled and is here today to discuss adjuvant treatment.  Unfortunately she did not know about the testing on the second tumor at surgery and thought she had a grade 1 ER/HI positive HER-2 negative tumor and was very surprised when  I told her the prior markers on the second tumor.    She is  0 para 0 menarche was at age 13 menopause at age 33 when she had a hysterectomy and oophorectomy.  She's been on hormone replacement since age of 33 until her recent diagnosis .    Family history is positive for mother having colon cancer age 72 maternal aunt with breast cancer in her 60s and another maternal aunt with uterine cancer in her 70s.  She's had negative genetic testing but the extended panel is being done and is pending    We discussed treatment plans and I have scheduled her for Chemotherapy education with tentative plans for weekly Taxol Herceptin for 12 weeks followed by Herceptin for a year  We will do an Oncotype DX score on the hormone positive tumor which is low-grade unlikely to be high risk    We discussed whether or not radiation would be indicated and except for the fact that there is a positive margin with high-grade DCIS normally she would not need radiation but I recommended a consultation because of the margins and the high grade nature of the DCIS.    We also discussed [facility] system for hair loss and she is interested and would like to know the cost involved and this will be discussed when she comes in for chemotherapy education in 2 weeks    Overall but he was taken aback because she did not expect the HER-2 positivity of her tumor but adjusted well to the news and hopefully will be able to take the treatment is recommended    Current Outpatient Prescriptions on File Prior to Visit   Medication Sig Dispense Refill   • chlorthalidone (HYGROTON) 25 MG tablet Take 25 mg by mouth Every Night.     • fexofenadine-pseudoephedrine (ALLEGRA-D 24) 180-240 MG per 24 hr tablet Take 1 tablet by mouth Daily.     • omeprazole-sodium bicarbonate (ZEGERID)  MG per capsule Take 1 capsule by mouth Every Night.     • raNITIdine (ZANTAC) 150 MG tablet Take 150 mg by mouth 2 (Two) Times a Day.     • Turmeric 500 MG tablet Take 1,000  "mg by mouth Daily.     • HYDROcodone-acetaminophen (NORCO) 5-325 MG per tablet Take 1-2 tablets by mouth Every 4 (Four) Hours As Needed (Pain). 8 tablet 0   • ondansetron (ZOFRAN) 8 MG tablet Take 1 tablet by mouth Every 8 (Eight) Hours As Needed for Nausea or Vomiting. 30 tablet 2     No current facility-administered medications on file prior to visit.         ALLERGIES:    Allergies   Allergen Reactions   • Moxifloxacin Anaphylaxis and Rash   • Latex Rash     Added based on information entered during log entry, please review and add reactions, type, and severity as needed        Social History     Social History   • Marital status:      Spouse name: FLORINA Gross   • Number of children: 0   • Years of education: Graduate School     Occupational History   • Physician [employer]     Social History Main Topics   • Smoking status: Never Smoker   • Smokeless tobacco: Never Used   • Alcohol use Yes     1 Glasses of wine per week   • Drug use: No     Other Topics Concern   • Not on file        Family History   Problem Relation Age of Onset   • Colon cancer Mother 72   • Breast cancer Maternal Aunt 60   • Uterine cancer Maternal Aunt 75   • Stroke Father    • Malig Hyperthermia Neg Hx         Review of Systems   Constitutional: Positive for diaphoresis.   Gastrointestinal:        Chronic indigestion   Endocrine: Positive for heat intolerance.   Neurological: Negative for numbness.        Objective     Vitals:    09/21/18 0837   BP: 126/88   Pulse: 88   Resp: 18   Temp: 98 °F (36.7 °C)   SpO2: 99%   Weight: 64.5 kg (142 lb 3.2 oz)   Height: 165 cm (64.96\")   PainSc: 0-No pain     Current Status 9/21/2018   ECOG score 0       Physical Exam    GENERAL:  Well-developed, well-nourished in no acute distress.   SKIN:  Warm, dry without rashes, purpura or petechiae.  EYES:  Pupils equal, round and reactive to light.  EOMs intact.  Conjunctivae normal.  EARS:  Hearing intact.  NOSE:  Septum midline. "  No excoriations or nasal discharge.  MOUTH:  Tongue is well-papillated; no stomatitis or ulcers.  Lips normal.  THROAT:  Oropharynx without lesions or exudates.  NECK:  Supple with good range of motion; no thyromegaly or masses, no JVD.  LYMPHATICS:  No cervical, supraclavicular, axillary or inguinal adenopathy.  CHEST:  Lungs clear to auscultation. Good airflow.  BREASTS: biLateral mastectomies with expanders in place-the middle of her incision on the right breast shows sutures with no active infection   CARDIAC:  Regular rate and rhythm without murmurs, rubs or gallops. Normal S1,S2.  ABDOMEN:  Soft, nontender with no hepatosplenomegaly or masses.  EXTREMITIES:  No clubbing, cyanosis or edema.  NEUROLOGICAL:  Cranial Nerves II-XII grossly intact.  No focal neurological deficits.  PSYCHIATRIC:  Normal affect and mood.        RECENT LABS:  Hematology WBC   Date Value Ref Range Status   09/21/2018 6.70 4.00 - 10.00 10*3/mm3 Final     RBC   Date Value Ref Range Status   09/21/2018 4.78 3.90 - 5.00 10*6/mm3 Final     Hemoglobin   Date Value Ref Range Status   09/21/2018 11.7 11.5 - 14.9 g/dL Final     Hematocrit   Date Value Ref Range Status   09/21/2018 37.8 34.0 - 45.0 % Final     Platelets   Date Value Ref Range Status   09/21/2018 360 150 - 375 10*3/mm3 Final        SYNOPTIC REPORT (Based on [facility] Cancer Case Summary, version January 2018):                Procedure: Total mastectomy.               Specimen/tumor laterality: Right.                Tumor site: Lower inner quadrant and central/inferior breast.                 Tumor size (greastest dimension of largest invasive focus): 1 cm (lower inner                       quadrant tumor).               Histologic type: Invasive carcinoma of no special type (ductal, not otherwise specified).                 Histologic grade (Rebeca Histologic Score):                              Glandular (acinar)/tubular differentiation: Score 3.                              Nuclear pleomorphism: Score 3.                             Mitotic rate: Score 2.                            Overall grade: Grade 3 (score 8 of 9).                            NOTE: The above [place] Histologic Score applies to the largest and                                    highest grade tumor (lower inner quadrant tumor).  The tumor in the                                    central/inferior breast is intermediate grade (tubular score 3, nuclear                                    score 2, mitotic score 1).               Tumor focality: Multiple foci of invasive carcinoma.                             Number of foci: 2.                              Sizes of individual foci: 1 cm (lower inner quadrant tumor) and approximately                                    0.8 cm (central/inferior tumor).               Ductal carcinoma in situ (DCIS): Present, positive for extensive intraductal component.                            Size (extent) of DCIS: Approximately greater than 6 cm (estimated based                                    on gross and microscopic findings).                            Architectural pattern: Solid and cribriform.                            Nuclear grade: Grade III (high).                            Necrosis: Central comedonecrosis.               Lobular carcinoma in situ (LCIS): No LCIS in specimen.               Margins:                             Invasive carcinoma margins: Uninvolved by invasive carcinoma.                                          Distance from closest margin: 2 mm (deep margin).                             DCIS margins: Positive for DCIS (inferior medial margin [lower inner quadrant]).                                          Additional DCIS margins: DCIS extends to within 2 mm of deep margin                                                 and to within 3 mm of inferior superficial soft tissue margin.  Atypical                                                 ductal  hyperplasia is focally present at the deep margin.               Regional lymph nodes: Involved by tumor cells.                             Number of lymph nodes with macrometastases: 0.                            Number of lymph nodes with micrometastases: 0.                            Number of lymph nodes with isolated tumor cells: 1.                            Number of lymph nodes examined: 3.                             Number of sentinel lymph nodes examined: 3.                Treatment effect: No known presurgical therapy.                Lymphovascular invasion: Present.               Dermal lymphovascular invasion: Not identified.                Pathologic stage classification:                             TNM descriptors: m (multiple foci of invasive carcinoma).                             Primary tumor: pT1b.                            Regional lymph nodes: pN0(i+)(sn).                             Distant metastasis: Not applicable.                Additional pathologic findings:                              Ductal hyperplasia of the usual type.                              Fibroinflammatory changes consistent with sites of prior biopsy (2 prior biopsy                                    sites).                 Ancillary studies:ER [place] negative HER-2 positive lower inner quadrant tumor       Assessment/Plan   1.  Multifocal right breast cancer T1 BN(ic)-isolated tumor cells medial tumor being grade 3 ER/WY negative HER-2 3+ lateral tumor being grade 1 ER/[place] positive HER-2 2+ negative fish  2.  Positive family history of multiple malignancies genetic testing negative  3.  Oncotype DX score sent on the wrong tumor will be recent on 9/21/18   4.  Wound dehiscence no signs of infection resutured    Plan  1.  Taxol Herceptin weekly  2 [employer] cooling system  3.  Repeat Oncotype on corrected specimen  4.  Return in 1 week for follow-up.              History of Present Illness    Review of Systems    Physical  Exam

## 2018-09-28 ENCOUNTER — OFFICE VISIT (OUTPATIENT)
Dept: ONCOLOGY | Facility: CLINIC | Age: 63
End: 2018-09-28

## 2018-09-28 ENCOUNTER — INFUSION (OUTPATIENT)
Dept: ONCOLOGY | Facility: HOSPITAL | Age: 63
End: 2018-09-28

## 2018-09-28 VITALS
TEMPERATURE: 97.8 F | OXYGEN SATURATION: 98 % | DIASTOLIC BLOOD PRESSURE: 74 MMHG | SYSTOLIC BLOOD PRESSURE: 110 MMHG | BODY MASS INDEX: 24.19 KG/M2 | HEIGHT: 65 IN | RESPIRATION RATE: 16 BRPM | HEART RATE: 78 BPM | WEIGHT: 145.2 LBS

## 2018-09-28 VITALS — HEART RATE: 74 BPM | DIASTOLIC BLOOD PRESSURE: 96 MMHG | SYSTOLIC BLOOD PRESSURE: 156 MMHG

## 2018-09-28 DIAGNOSIS — Z45.2 FITTING AND ADJUSTMENT OF VASCULAR CATHETER: ICD-10-CM

## 2018-09-28 DIAGNOSIS — C50.811 MALIGNANT NEOPLASM OF OVERLAPPING SITES OF RIGHT BREAST IN FEMALE, ESTROGEN RECEPTOR NEGATIVE (HCC): Primary | ICD-10-CM

## 2018-09-28 DIAGNOSIS — Z17.1 MALIGNANT NEOPLASM OF OVERLAPPING SITES OF RIGHT BREAST IN FEMALE, ESTROGEN RECEPTOR NEGATIVE (HCC): Primary | ICD-10-CM

## 2018-09-28 DIAGNOSIS — C50.811 MALIGNANT NEOPLASM OF OVERLAPPING SITES OF RIGHT BREAST IN FEMALE, ESTROGEN RECEPTOR NEGATIVE (HCC): ICD-10-CM

## 2018-09-28 DIAGNOSIS — Z17.1 MALIGNANT NEOPLASM OF OVERLAPPING SITES OF RIGHT BREAST IN FEMALE, ESTROGEN RECEPTOR NEGATIVE (HCC): ICD-10-CM

## 2018-09-28 LAB
BASOPHILS # BLD AUTO: 0.04 10*3/MM3 (ref 0–0.1)
BASOPHILS NFR BLD AUTO: 0.7 % (ref 0–1.1)
DEPRECATED RDW RBC AUTO: 43.2 FL (ref 37–49)
EOSINOPHIL # BLD AUTO: 0.13 10*3/MM3 (ref 0–0.36)
EOSINOPHIL NFR BLD AUTO: 2.3 % (ref 1–5)
ERYTHROCYTE [DISTWIDTH] IN BLOOD BY AUTOMATED COUNT: 15.3 % (ref 11.7–14.5)
HCT VFR BLD AUTO: 35.3 % (ref 34–45)
HGB BLD-MCNC: 11 G/DL (ref 11.5–14.9)
IMM GRANULOCYTES # BLD: 0.05 10*3/MM3 (ref 0–0.03)
IMM GRANULOCYTES NFR BLD: 0.9 % (ref 0–0.5)
LYMPHOCYTES # BLD AUTO: 1.9 10*3/MM3 (ref 1–3.5)
LYMPHOCYTES NFR BLD AUTO: 34.1 % (ref 20–49)
MCH RBC QN AUTO: 24.6 PG (ref 27–33)
MCHC RBC AUTO-ENTMCNC: 31.2 G/DL (ref 32–35)
MCV RBC AUTO: 79 FL (ref 83–97)
MONOCYTES # BLD AUTO: 0.27 10*3/MM3 (ref 0.25–0.8)
MONOCYTES NFR BLD AUTO: 4.8 % (ref 4–12)
NEUTROPHILS # BLD AUTO: 3.19 10*3/MM3 (ref 1.5–7)
NEUTROPHILS NFR BLD AUTO: 57.2 % (ref 39–75)
NRBC BLD MANUAL-RTO: 0 /100 WBC (ref 0–0)
PLATELET # BLD AUTO: 350 10*3/MM3 (ref 150–375)
PMV BLD AUTO: 9.2 FL (ref 8.9–12.1)
RBC # BLD AUTO: 4.47 10*6/MM3 (ref 3.9–5)
WBC NRBC COR # BLD: 5.58 10*3/MM3 (ref 4–10)

## 2018-09-28 PROCEDURE — 99214 OFFICE O/P EST MOD 30 MIN: CPT | Performed by: INTERNAL MEDICINE

## 2018-09-28 PROCEDURE — 96375 TX/PRO/DX INJ NEW DRUG ADDON: CPT | Performed by: INTERNAL MEDICINE

## 2018-09-28 PROCEDURE — 25010000002 TRASTUZUMAB PER 10 MG: Performed by: INTERNAL MEDICINE

## 2018-09-28 PROCEDURE — 25010000002 PALONOSETRON PER 25 MCG: Performed by: INTERNAL MEDICINE

## 2018-09-28 PROCEDURE — 85025 COMPLETE CBC W/AUTO DIFF WBC: CPT

## 2018-09-28 PROCEDURE — 96417 CHEMO IV INFUS EACH ADDL SEQ: CPT | Performed by: INTERNAL MEDICINE

## 2018-09-28 PROCEDURE — 25010000003 DEXAMETHASONE SODIUM PHOSPHATE 100 MG/10ML SOLUTION: Performed by: INTERNAL MEDICINE

## 2018-09-28 PROCEDURE — 25010000002 DIPHENHYDRAMINE PER 50 MG: Performed by: INTERNAL MEDICINE

## 2018-09-28 PROCEDURE — 25010000002 PACLITAXEL PER 30 MG: Performed by: INTERNAL MEDICINE

## 2018-09-28 PROCEDURE — 96413 CHEMO IV INFUSION 1 HR: CPT | Performed by: INTERNAL MEDICINE

## 2018-09-28 PROCEDURE — 25010000002 HEPARIN FLUSH (PORCINE) 100 UNIT/ML SOLUTION: Performed by: INTERNAL MEDICINE

## 2018-09-28 PROCEDURE — 96415 CHEMO IV INFUSION ADDL HR: CPT | Performed by: INTERNAL MEDICINE

## 2018-09-28 RX ORDER — SODIUM CHLORIDE 9 MG/ML
250 INJECTION, SOLUTION INTRAVENOUS ONCE
Status: COMPLETED | OUTPATIENT
Start: 2018-09-28 | End: 2018-09-28

## 2018-09-28 RX ORDER — FAMOTIDINE 10 MG/ML
20 INJECTION, SOLUTION INTRAVENOUS ONCE
Status: COMPLETED | OUTPATIENT
Start: 2018-09-28 | End: 2018-09-28

## 2018-09-28 RX ORDER — GRANISETRON HYDROCHLORIDE 1 MG/ML
1 INJECTION INTRAVENOUS ONCE
Status: CANCELLED
Start: 2018-09-28 | End: 2018-09-28

## 2018-09-28 RX ORDER — PALONOSETRON 0.05 MG/ML
0.25 INJECTION, SOLUTION INTRAVENOUS ONCE
Status: COMPLETED | OUTPATIENT
Start: 2018-09-28 | End: 2018-09-28

## 2018-09-28 RX ORDER — SODIUM CHLORIDE 0.9 % (FLUSH) 0.9 %
10 SYRINGE (ML) INJECTION AS NEEDED
Status: CANCELLED | OUTPATIENT
Start: 2018-09-28

## 2018-09-28 RX ADMIN — SODIUM CHLORIDE 250 ML: 900 INJECTION, SOLUTION INTRAVENOUS at 09:43

## 2018-09-28 RX ADMIN — TRASTUZUMAB 130 MG: 150 INJECTION, POWDER, LYOPHILIZED, FOR SOLUTION INTRAVENOUS at 12:33

## 2018-09-28 RX ADMIN — PACLITAXEL 135 MG: 6 INJECTION, SOLUTION INTRAVENOUS at 10:53

## 2018-09-28 RX ADMIN — PALONOSETRON 0.25 MG: 0.05 INJECTION, SOLUTION INTRAVENOUS at 09:47

## 2018-09-28 RX ADMIN — DEXAMETHASONE SODIUM PHOSPHATE 12 MG: 10 INJECTION, SOLUTION INTRAMUSCULAR; INTRAVENOUS at 10:07

## 2018-09-28 RX ADMIN — DIPHENHYDRAMINE HYDROCHLORIDE 25 MG: 50 INJECTION, SOLUTION INTRAMUSCULAR; INTRAVENOUS at 09:50

## 2018-09-28 RX ADMIN — SODIUM CHLORIDE, PRESERVATIVE FREE 500 UNITS: 5 INJECTION INTRAVENOUS at 13:32

## 2018-09-28 RX ADMIN — FAMOTIDINE 20 MG: 10 INJECTION, SOLUTION INTRAVENOUS at 09:48

## 2018-09-28 NOTE — PROGRESS NOTES
Immediately after receiving Aloxi, pt started having watery itchy eyes and nasal congestion. Pt c/o itching all over but didn't have any hives or redness. After pepcid and Benadryl pre-meds were given, symptoms completely resolved without any additional meds added. Aloxi added to allergy list. Natalia was notified and was able to verify Kytril approval with Tosin DESOUZA and added it to future treatments. Aloxi d/c'd.    Pt had Paxman applied to scalp 45 minutes prior to tx and 90 minutes during Taxol infusion. Paxman remained intact to scalp 1 hour post tx and removed 10 minutes after it was turned off. Pt used our size small cap for tx because her cap did not fit snug enough. Pt was d/cd with her cap and key.Was advised to contact [place] for instructions on how to ship it back and get the correct fitting cap. Pt also reminded to remove and keep her key prior to shipping it. Pt v/u.

## 2018-09-28 NOTE — PROGRESS NOTES
Per verbal order Dr Hudson Velazco added to treatment plan due to patient vomitting. Per [employer] [place] pre-cert Insurance approved Aloxi.    Addendum: patient had reaction to Aloxi. Aloxi discontinue. Per [employer] pre-cert patient approved for Kytril.

## 2018-09-28 NOTE — PROGRESS NOTES
Subjective     REASON FOR CONSULTATION:    1.Multifocal right breast cancer post bilateral mastectomies- T1b N0+-10:00 tumor 8 mm grade 1 ER/MA positive HER-2 negative Oncotype pending    2.  5:00 tumor right breast ER/MA negative HER-2 positive  3.  Negative genetic testing  4.  Adequate echo weekly Taxol Herceptin started on 9/21/18                          Referring physician Kwaku Gudino M.D.      History of Present Illness : This is a 62-year-old lady with a multifocal right breast cancer T1b N0+ ER/MA negative HER-2 positive and the second T1b ER/MA positive HER-2 negative tumor here for second dose of Taxol Herceptin on a weekly schedule.    She did fairly well with the first treatment until Sunday when she had nausea most of the day and then his symptoms improved  She has no neuropathy in her hair obviously has no thinning    Repeat Oncotype on her second tumor is still pending    Right implant was resutured and there is no signs of infection         Past Medical History:   Diagnosis Date   • Environmental allergies    • GERD (gastroesophageal reflux disease)    • History of foreign travel     [place]   • Hypertension    • Malignant neoplasm of overlapping sites of right breast in female, estrogen receptor positive (CMS/HCC) 7/18/2018        Past Surgical History:   Procedure Laterality Date   • BREAST BIOPSY Right 2018    Malignant   • BREAST RECONSTRUCTION Bilateral 8/1/2018    Procedure: BILATERAL BREAST TISSUE EXPANDER INSERTION WITH ALLODERM;  Surgeon: Waterhouse, Maurine, MD;  Location: [facility] OR;  Service: Plastics   • COLONOSCOPY N/A 3/24/2017    Procedure: COLONOSCOPY TO CECUM AND TI;  Surgeon: Aleksander Bella MD;  Location: [facility] END[facility]COPY;  Service:    • ENDOSCOPY N/A 3/24/2017    Procedure: ESOPHAGOGASTRODUODENOSCOPY;  Surgeon: Aleksander Bella MD;  Location: [facility] ENDOSCOPY;  Service:    • HYSTERECTOMY  1989   • MASTECTOMY W/ SENTINEL NODE BIOPSY Bilateral 8/1/2018    Procedure:  BILATERAL TOTAL MASTECTOMY WITH RIGHT SENTINEL LYMPH MARTINEZ BIOPSY;  Surgeon: Kwaku Gudino MD;  Location: [facility];  Service: General   • OOPHORECTOMY Bilateral 1989   • VENOUS ACCESS DEVICE (PORT) INSERTION Left 9/14/2018    Procedure: INSERTION VENOUS ACCESS DEVICE;  Surgeon: Kwaku Gudino MD;  Location: [facility];  Service: General      ONCOLOGIC HISTORY;  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  The patient had never had a previous mammogram and was referred for evaluation at which time she was found to have a 4.2 cm mass in the upper outer quadrant of the right breast at the 9:30 position associated with pleomorphic calcifications in the lower inner quadrant also felt to be suspicious left breast was benign.  Patient underwent ultrasound guided biopsy on 6/15/18 with the biopsy at the 9:30 position with clip placement showing invasive mammary carcinoma ductal type low-grade ER % FL 9800% HER-2 2+ negative by fish.  Patient also underwent biopsy at the 4:00 position which showed a microscopic focus of invasive ductal carcinoma felt to be probably a lymphatic space involvement and was too small to do additional testing and a clip was placed.  Patient went on to have bilateral breast MRIs and was referred to Dr. Gudino MRIs confirmed 2 areas of abnormality at the breast-lower inner quadrant showed a large hematoma with a clip identified and there is extensive clumped abnormal enhancement beginning superior to the biopsy cavity and extending laterally to the entire upper half of the right breast into the upper outer quadrant measuring 1.2 x 1.4 cm and extending over 7 cm.  A biopsy clip was identified in this area the abnormal enhancement focally abutted the pectoralis muscle medially with no invasion through few borderline enlarged right axillary nodes with normal  morphology no internal mammary adenopathy was noted left breast was benign.  Patient went on to have bilateral mastectomies the final path report showed 2 separate primaries in the breast associated with extensive high-grade DCIS measuring 6 cm  The lower inner quadrant lesion measured 1 cm was a grade 3 invasive ductal carcinoma ER 0 IL 0 HER-2 3+ and the lesion at the 9:30 position was a grade 1 invasive ductal carcinoma measuring 8 mm strongly ER/IL positive and HER-2 negative based on preop testing.  3 sentinel nodes were obtained 1 showed isolated tumor cells.  Margins showed  Positivity with DCIS in the inferior medial margin and was close to the deep margin (2mm) and inferior superficial margin(3mm).  Lymphovascular invasion was present.  Left breast is benign    Postoperatively she's done well and had her expanders filled and is here today to discuss adjuvant treatment.  Unfortunately she did not know about the testing on the second tumor at surgery and thought she had a grade 1 ER/IL positive HER-2 negative tumor and was very surprised when I told her the prior markers on the second tumor.    She is  0 para 0 menarche was at age 13 menopause at age 33 when she had a hysterectomy and oophorectomy.  She's been on hormone replacement since age of 33 until her recent diagnosis .    Family history is positive for mother having colon cancer age 72 maternal aunt with breast cancer in her 60s and another maternal aunt with uterine cancer in her 70s.  She's had negative genetic testing but the extended panel is being done and is pending    We discussed treatment plans and I have scheduled her for Chemotherapy education with tentative plans for weekly Taxol Herceptin for 12 weeks followed by Herceptin for a year  We will do an Oncotype DX score on the hormone positive tumor which is low-grade unlikely to be high risk    We discussed whether or not radiation would be indicated and except for the fact that there  is a positive margin with high-grade DCIS normally she would not need radiation but I recommended a consultation because of the margins and the high grade nature of the DCIS.    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  She is presented at the multidiscipline conference and everybody felt comfortable with weekly Taxol Herceptin for 12 weeks followed by Herceptin for a year and Oncotype testing on the second tumor for which she would receive an aromatase inhibitor for 5 years unless it was high risk which is very unlikely    She is here today having had an echo which showed an adequate ejection fraction  Of 61% and she had a port placed and is here to start treatment.    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    Unfortunately the wrong specimen was sent for Oncotype testing and ER negative specimen was sent and I talked to the pathologist and they will send the correct specimen for Oncotype DX testing with no talked to the patient.    That he has no questions and is happy to finally get started with therapy.    Genetic testing  results are negative using the INVITAE  panel  -      Current Outpatient Prescriptions on File Prior to Visit   Medication Sig Dispense Refill   • chlorthalidone (HYGROTON) 25 MG tablet Take 25 mg by mouth Every Night.     • fexofenadine-pseudoephedrine (ALLEGRA-D 24) 180-240 MG per 24 hr tablet Take 1 tablet by mouth Daily.     • omeprazole-sodium bicarbonate (ZEGERID)  MG per capsule Take 1 capsule by mouth Every Night.     • ondansetron (ZOFRAN) 8 MG tablet Take 1 tablet by mouth Every 8 (Eight) Hours As Needed for Nausea or Vomiting. 30 tablet 2   • raNITIdine (ZANTAC) 150 MG tablet Take 150 mg by mouth 2 (Two) Times a Day.     • Turmeric 500 MG tablet Take 1,000 mg by mouth Daily.     • HYDROcodone-acetaminophen (NORCO) 5-325 MG per tablet Take 1-2 tablets by mouth Every 4 (Four) Hours As Needed (Pain). 8 tablet 0     No current facility-administered medications on file prior to visit.         ALLERGIES:    Allergies   Allergen Reactions   • Moxifloxacin Anaphylaxis and Rash   • Latex Rash     Added based on information entered during log entry, please review and add reactions, type, and severity as needed        Social History     Social History   • Marital status:      Spouse name: FLORINA Gross   • Number of children: 0   • Years of education: Graduate School     Occupational History   • Physician [employer]     Social History Main Topics   • Smoking status: Never Smoker   • Smokeless tobacco: Never Used   • Alcohol use Yes     1 Glasses of wine per week   • Drug use: No     Other Topics Concern   • Not on file        Family History   Problem Relation Age of Onset   • Colon cancer Mother 72   • Breast cancer Maternal Aunt 60   • Uterine cancer Maternal Aunt 75   • Stroke Father    • Malig Hyperthermia Neg Hx         Review of Systems   Constitutional: Positive for diaphoresis.   Gastrointestinal:        Chronic indigestion   Endocrine: Positive for heat intolerance.  "  Neurological: Negative for numbness.        Objective     Vitals:    09/28/18 0856   BP: 110/74   Pulse: 78   Resp: 16   Temp: 97.8 °F (36.6 °C)   SpO2: 98%   Weight: 65.9 kg (145 lb 3.2 oz)   Height: 165 cm (64.96\")   PainSc: 0-No pain     Current Status 9/28/2018   ECOG score 0       Physical Exam    GENERAL:  Well-developed, well-nourished in no acute distress.   SKIN:  Warm, dry without rashes, purpura or petechiae.  EYES:  Pupils equal, round and reactive to light.  EOMs intact.  Conjunctivae normal.  EARS:  Hearing intact.  NOSE:  Septum midline.  No excoriations or nasal discharge.  MOUTH:  Tongue is well-papillated; no stomatitis or ulcers.  Lips normal.  THROAT:  Oropharynx without lesions or exudates.  NECK:  Supple with good range of motion; no thyromegaly or masses, no JVD.  LYMPHATICS:  No cervical, supraclavicular, axillary or inguinal adenopathy.  CHEST:  Lungs clear to auscultation. Good airflow.  BREASTS: biLateral mastectomies with expanders in place-the middle of her incision on the right breast shows sutures with no active infection   CARDIAC:  Regular rate and rhythm without murmurs, rubs or gallops. Normal S1,S2.  ABDOMEN:  Soft, nontender with no hepatosplenomegaly or masses.  EXTREMITIES:  No clubbing, cyanosis or edema.  NEUROLOGICAL:  Cranial Nerves II-XII grossly intact.  No focal neurological deficits.  PSYCHIATRIC:  Normal affect and mood.        RECENT LABS:  Hematology WBC   Date Value Ref Range Status   09/28/2018 5.58 4.00 - 10.00 10*3/mm3 Final     RBC   Date Value Ref Range Status   09/28/2018 4.47 3.90 - 5.00 10*6/mm3 Final     Hemoglobin   Date Value Ref Range Status   09/28/2018 11.0 (L) 11.5 - 14.9 g/dL Final     Hematocrit   Date Value Ref Range Status   09/28/2018 35.3 34.0 - 45.0 % Final     Platelets   Date Value Ref Range Status   09/28/2018 350 150 - 375 10*3/mm3 Final        SYNOPTIC REPORT (Based on [facility] Cancer Case Summary, version January 2018):                " Procedure: Total mastectomy.               Specimen/tumor laterality: Right.                Tumor site: Lower inner quadrant and central/inferior breast.                 Tumor size (greastest dimension of largest invasive focus): 1 cm (lower inner                       quadrant tumor).               Histologic type: Invasive carcinoma of no special type (ductal, not otherwise specified).                 Histologic grade (Galloway Histologic Score):                              Glandular (acinar)/tubular differentiation: Score 3.                             Nuclear pleomorphism: Score 3.                             Mitotic rate: Score 2.                            Overall grade: Grade 3 (score 8 of 9).                            NOTE: The above Galloway Histologic Score applies to the largest and                                    highest grade tumor (lower inner quadrant tumor).  The tumor in the                                    central/inferior breast is intermediate grade (tubular score 3, nuclear                                    score 2, mitotic score 1).               Tumor focality: Multiple foci of invasive carcinoma.                             Number of foci: 2.                              Sizes of individual foci: 1 cm (lower inner quadrant tumor) and approximately                                    0.8 cm (central/inferior tumor).               Ductal carcinoma in situ (DCIS): Present, positive for extensive intraductal component.                            Size (extent) of DCIS: Approximately greater than 6 cm (estimated based                                    on gross and microscopic findings).                            Architectural pattern: Solid and cribriform.                            Nuclear grade: Grade III (high).                            Necrosis: Central comedonecrosis.               Lobular carcinoma in situ (LCIS): No LCIS in specimen.               Margins:                              Invasive carcinoma margins: Uninvolved by invasive carcinoma.                                          Distance from closest margin: 2 mm (deep margin).                             DCIS margins: Positive for DCIS (inferior medial margin [lower inner quadrant]).                                          Additional DCIS margins: DCIS extends to within 2 mm of deep margin                                                 and to within 3 mm of inferior superficial soft tissue margin.  Atypical                                                 ductal hyperplasia is focally present at the deep margin.               Regional lymph nodes: Involved by tumor cells.                             Number of lymph nodes with macrometastases: 0.                            Number of lymph nodes with micrometastases: 0.                            Number of lymph nodes with isolated tumor cells: 1.                            Number of lymph nodes examined: 3.                             Number of sentinel lymph nodes examined: 3.                Treatment effect: No known presurgical therapy.                Lymphovascular invasion: Present.               Dermal lymphovascular invasion: Not identified.                Pathologic stage classification:                             TNM descriptors: m (multiple foci of invasive carcinoma).                             Primary tumor: pT1b.                            Regional lymph nodes: pN0(i+)(sn).                             Distant metastasis: Not applicable.                Additional pathologic findings:                              Ductal hyperplasia of the usual type.                              Fibroinflammatory changes consistent with sites of prior biopsy (2 prior biopsy                                    sites).                 Ancillary studies:ER MO negative HER-2 positive lower inner quadrant tumor       Assessment/Plan   1.  Multifocal right breast cancer T1 BN(ic)-isolated  tumor cells medial tumor being grade 3 ER/PA negative HER-2 3+ lateral tumor being grade 1 ER/PA positive HER-2 2+ negative fish  2.  Positive family history of multiple malignancies genetic testing negative  3.  Oncotype DX score sent on the wrong tumor will be recent on 9/21/18   4.  Wound dehiscence no signs of infection resutured    Plan  1.  Taxol Herceptin weekly-add aloxi for delayed nausea  2 [employer] cooling system  3.  Repeat Oncotype on corrected specimen pending  4.  Return in 2 week for follow-up.              History of Present Illness    Review of Systems Physical Exam

## 2018-10-02 LAB
CYTO UR: NORMAL
LAB AP CASE REPORT: NORMAL
Lab: NORMAL
PATH REPORT.ADDENDUM SPEC: NORMAL
PATH REPORT.FINAL DX SPEC: NORMAL
PATH REPORT.GROSS SPEC: NORMAL

## 2018-10-05 ENCOUNTER — INFUSION (OUTPATIENT)
Dept: ONCOLOGY | Facility: HOSPITAL | Age: 63
End: 2018-10-05

## 2018-10-05 VITALS
SYSTOLIC BLOOD PRESSURE: 147 MMHG | DIASTOLIC BLOOD PRESSURE: 89 MMHG | HEART RATE: 72 BPM | TEMPERATURE: 97.8 F | WEIGHT: 134 LBS | BODY MASS INDEX: 22.33 KG/M2

## 2018-10-05 DIAGNOSIS — Z45.2 FITTING AND ADJUSTMENT OF VASCULAR CATHETER: ICD-10-CM

## 2018-10-05 DIAGNOSIS — C50.811 MALIGNANT NEOPLASM OF OVERLAPPING SITES OF RIGHT BREAST IN FEMALE, ESTROGEN RECEPTOR NEGATIVE (HCC): Primary | ICD-10-CM

## 2018-10-05 DIAGNOSIS — Z17.1 MALIGNANT NEOPLASM OF OVERLAPPING SITES OF RIGHT BREAST IN FEMALE, ESTROGEN RECEPTOR NEGATIVE (HCC): Primary | ICD-10-CM

## 2018-10-05 DIAGNOSIS — C50.811 MALIGNANT NEOPLASM OF OVERLAPPING SITES OF RIGHT BREAST IN FEMALE, ESTROGEN RECEPTOR NEGATIVE (HCC): ICD-10-CM

## 2018-10-05 DIAGNOSIS — Z17.1 MALIGNANT NEOPLASM OF OVERLAPPING SITES OF RIGHT BREAST IN FEMALE, ESTROGEN RECEPTOR NEGATIVE (HCC): ICD-10-CM

## 2018-10-05 LAB
BASOPHILS # BLD AUTO: 0.04 10*3/MM3 (ref 0–0.1)
BASOPHILS NFR BLD AUTO: 1 % (ref 0–1.1)
DEPRECATED RDW RBC AUTO: 43.8 FL (ref 37–49)
EOSINOPHIL # BLD AUTO: 0.07 10*3/MM3 (ref 0–0.36)
EOSINOPHIL NFR BLD AUTO: 1.7 % (ref 1–5)
ERYTHROCYTE [DISTWIDTH] IN BLOOD BY AUTOMATED COUNT: 15.8 % (ref 11.7–14.5)
HCT VFR BLD AUTO: 34.3 % (ref 34–45)
HGB BLD-MCNC: 10.7 G/DL (ref 11.5–14.9)
IMM GRANULOCYTES # BLD: 0.02 10*3/MM3 (ref 0–0.03)
IMM GRANULOCYTES NFR BLD: 0.5 % (ref 0–0.5)
LYMPHOCYTES # BLD AUTO: 1.48 10*3/MM3 (ref 1–3.5)
LYMPHOCYTES NFR BLD AUTO: 36.8 % (ref 20–49)
MCH RBC QN AUTO: 24.5 PG (ref 27–33)
MCHC RBC AUTO-ENTMCNC: 31.2 G/DL (ref 32–35)
MCV RBC AUTO: 78.7 FL (ref 83–97)
MONOCYTES # BLD AUTO: 0.21 10*3/MM3 (ref 0.25–0.8)
MONOCYTES NFR BLD AUTO: 5.2 % (ref 4–12)
NEUTROPHILS # BLD AUTO: 2.2 10*3/MM3 (ref 1.5–7)
NEUTROPHILS NFR BLD AUTO: 54.8 % (ref 39–75)
NRBC BLD MANUAL-RTO: 0 /100 WBC (ref 0–0)
PLATELET # BLD AUTO: 428 10*3/MM3 (ref 150–375)
PMV BLD AUTO: 8.7 FL (ref 8.9–12.1)
RBC # BLD AUTO: 4.36 10*6/MM3 (ref 3.9–5)
WBC NRBC COR # BLD: 4.02 10*3/MM3 (ref 4–10)

## 2018-10-05 PROCEDURE — 85025 COMPLETE CBC W/AUTO DIFF WBC: CPT

## 2018-10-05 PROCEDURE — 96375 TX/PRO/DX INJ NEW DRUG ADDON: CPT | Performed by: INTERNAL MEDICINE

## 2018-10-05 PROCEDURE — 96413 CHEMO IV INFUSION 1 HR: CPT | Performed by: INTERNAL MEDICINE

## 2018-10-05 PROCEDURE — 25010000002 DIPHENHYDRAMINE PER 50 MG: Performed by: INTERNAL MEDICINE

## 2018-10-05 PROCEDURE — 25010000002 PACLITAXEL PER 30 MG: Performed by: INTERNAL MEDICINE

## 2018-10-05 PROCEDURE — 96417 CHEMO IV INFUS EACH ADDL SEQ: CPT | Performed by: INTERNAL MEDICINE

## 2018-10-05 PROCEDURE — 25010000002 TRASTUZUMAB PER 10 MG: Performed by: INTERNAL MEDICINE

## 2018-10-05 PROCEDURE — 25010000002 GRANISETRON PER 100 MCG: Performed by: INTERNAL MEDICINE

## 2018-10-05 PROCEDURE — 25010000003 DEXAMETHASONE SODIUM PHOSPHATE 100 MG/10ML SOLUTION: Performed by: INTERNAL MEDICINE

## 2018-10-05 RX ORDER — SODIUM CHLORIDE 9 MG/ML
250 INJECTION, SOLUTION INTRAVENOUS ONCE
Status: COMPLETED | OUTPATIENT
Start: 2018-10-05 | End: 2018-10-05

## 2018-10-05 RX ORDER — SODIUM CHLORIDE 0.9 % (FLUSH) 0.9 %
10 SYRINGE (ML) INJECTION AS NEEDED
Status: DISCONTINUED | OUTPATIENT
Start: 2018-10-05 | End: 2018-10-05 | Stop reason: HOSPADM

## 2018-10-05 RX ORDER — GRANISETRON HYDROCHLORIDE 1 MG/ML
1 INJECTION INTRAVENOUS ONCE
Status: COMPLETED | OUTPATIENT
Start: 2018-10-05 | End: 2018-10-05

## 2018-10-05 RX ORDER — SODIUM CHLORIDE 0.9 % (FLUSH) 0.9 %
10 SYRINGE (ML) INJECTION AS NEEDED
Status: CANCELLED | OUTPATIENT
Start: 2018-10-05

## 2018-10-05 RX ADMIN — PACLITAXEL 135 MG: 6 INJECTION, SOLUTION INTRAVENOUS at 10:46

## 2018-10-05 RX ADMIN — SODIUM CHLORIDE 250 ML: 900 INJECTION, SOLUTION INTRAVENOUS at 09:35

## 2018-10-05 RX ADMIN — SODIUM CHLORIDE, PRESERVATIVE FREE 500 UNITS: 5 INJECTION INTRAVENOUS at 12:50

## 2018-10-05 RX ADMIN — GRANISETRON HYDROCHLORIDE 1 MG: 1 INJECTION INTRAVENOUS at 10:10

## 2018-10-05 RX ADMIN — DIPHENHYDRAMINE HYDROCHLORIDE 25 MG: 50 INJECTION, SOLUTION INTRAMUSCULAR; INTRAVENOUS at 09:35

## 2018-10-05 RX ADMIN — Medication 10 ML: at 12:50

## 2018-10-05 RX ADMIN — TRASTUZUMAB 130 MG: 150 INJECTION, POWDER, LYOPHILIZED, FOR SOLUTION INTRAVENOUS at 10:13

## 2018-10-05 RX ADMIN — DEXAMETHASONE SODIUM PHOSPHATE 12 MG: 10 INJECTION, SOLUTION INTRAMUSCULAR; INTRAVENOUS at 09:54

## 2018-10-05 RX ADMIN — FAMOTIDINE 20 MG: 10 INJECTION, SOLUTION INTRAVENOUS at 09:35

## 2018-10-11 ENCOUNTER — APPOINTMENT (OUTPATIENT)
Dept: ONCOLOGY | Facility: CLINIC | Age: 63
End: 2018-10-11

## 2018-10-11 ENCOUNTER — APPOINTMENT (OUTPATIENT)
Dept: LAB | Facility: HOSPITAL | Age: 63
End: 2018-10-11

## 2018-10-12 ENCOUNTER — INFUSION (OUTPATIENT)
Dept: ONCOLOGY | Facility: HOSPITAL | Age: 63
End: 2018-10-12

## 2018-10-12 VITALS
TEMPERATURE: 98.4 F | DIASTOLIC BLOOD PRESSURE: 76 MMHG | SYSTOLIC BLOOD PRESSURE: 136 MMHG | WEIGHT: 135.4 LBS | HEART RATE: 102 BPM | BODY MASS INDEX: 22.56 KG/M2

## 2018-10-12 DIAGNOSIS — C50.811 MALIGNANT NEOPLASM OF OVERLAPPING SITES OF RIGHT BREAST IN FEMALE, ESTROGEN RECEPTOR NEGATIVE (HCC): Primary | ICD-10-CM

## 2018-10-12 DIAGNOSIS — C50.811 MALIGNANT NEOPLASM OF OVERLAPPING SITES OF RIGHT BREAST IN FEMALE, ESTROGEN RECEPTOR NEGATIVE (HCC): ICD-10-CM

## 2018-10-12 DIAGNOSIS — Z17.1 MALIGNANT NEOPLASM OF OVERLAPPING SITES OF RIGHT BREAST IN FEMALE, ESTROGEN RECEPTOR NEGATIVE (HCC): Primary | ICD-10-CM

## 2018-10-12 DIAGNOSIS — Z17.1 MALIGNANT NEOPLASM OF OVERLAPPING SITES OF RIGHT BREAST IN FEMALE, ESTROGEN RECEPTOR NEGATIVE (HCC): ICD-10-CM

## 2018-10-12 LAB
BASOPHILS # BLD AUTO: 0.05 10*3/MM3 (ref 0–0.1)
BASOPHILS NFR BLD AUTO: 1 % (ref 0–1.1)
DEPRECATED RDW RBC AUTO: 46.7 FL (ref 37–49)
EOSINOPHIL # BLD AUTO: 0.09 10*3/MM3 (ref 0–0.36)
EOSINOPHIL NFR BLD AUTO: 1.9 % (ref 1–5)
ERYTHROCYTE [DISTWIDTH] IN BLOOD BY AUTOMATED COUNT: 17.1 % (ref 11.7–14.5)
HCT VFR BLD AUTO: 33.8 % (ref 34–45)
HGB BLD-MCNC: 10.6 G/DL (ref 11.5–14.9)
IMM GRANULOCYTES # BLD: 0.06 10*3/MM3 (ref 0–0.03)
IMM GRANULOCYTES NFR BLD: 1.2 % (ref 0–0.5)
LYMPHOCYTES # BLD AUTO: 1.65 10*3/MM3 (ref 1–3.5)
LYMPHOCYTES NFR BLD AUTO: 34 % (ref 20–49)
MCH RBC QN AUTO: 24.8 PG (ref 27–33)
MCHC RBC AUTO-ENTMCNC: 31.4 G/DL (ref 32–35)
MCV RBC AUTO: 79.2 FL (ref 83–97)
MONOCYTES # BLD AUTO: 0.42 10*3/MM3 (ref 0.25–0.8)
MONOCYTES NFR BLD AUTO: 8.6 % (ref 4–12)
NEUTROPHILS # BLD AUTO: 2.59 10*3/MM3 (ref 1.5–7)
NEUTROPHILS NFR BLD AUTO: 53.3 % (ref 39–75)
NRBC BLD MANUAL-RTO: 0 /100 WBC (ref 0–0)
PLATELET # BLD AUTO: 406 10*3/MM3 (ref 150–375)
PMV BLD AUTO: 8.7 FL (ref 8.9–12.1)
RBC # BLD AUTO: 4.27 10*6/MM3 (ref 3.9–5)
WBC NRBC COR # BLD: 4.86 10*3/MM3 (ref 4–10)

## 2018-10-12 PROCEDURE — 25010000002 PACLITAXEL PER 30 MG: Performed by: INTERNAL MEDICINE

## 2018-10-12 PROCEDURE — 96375 TX/PRO/DX INJ NEW DRUG ADDON: CPT | Performed by: INTERNAL MEDICINE

## 2018-10-12 PROCEDURE — 96413 CHEMO IV INFUSION 1 HR: CPT | Performed by: INTERNAL MEDICINE

## 2018-10-12 PROCEDURE — 25010000003 DEXAMETHASONE SODIUM PHOSPHATE 100 MG/10ML SOLUTION: Performed by: INTERNAL MEDICINE

## 2018-10-12 PROCEDURE — 25010000002 DIPHENHYDRAMINE PER 50 MG: Performed by: INTERNAL MEDICINE

## 2018-10-12 PROCEDURE — 25010000002 TRASTUZUMAB PER 10 MG: Performed by: INTERNAL MEDICINE

## 2018-10-12 PROCEDURE — 96417 CHEMO IV INFUS EACH ADDL SEQ: CPT | Performed by: INTERNAL MEDICINE

## 2018-10-12 PROCEDURE — 25010000002 GRANISETRON PER 100 MCG: Performed by: INTERNAL MEDICINE

## 2018-10-12 PROCEDURE — 85025 COMPLETE CBC W/AUTO DIFF WBC: CPT

## 2018-10-12 RX ORDER — GRANISETRON HYDROCHLORIDE 1 MG/ML
1 INJECTION INTRAVENOUS ONCE
Status: COMPLETED | OUTPATIENT
Start: 2018-10-12 | End: 2018-10-12

## 2018-10-12 RX ORDER — SODIUM CHLORIDE 9 MG/ML
250 INJECTION, SOLUTION INTRAVENOUS ONCE
Status: COMPLETED | OUTPATIENT
Start: 2018-10-12 | End: 2018-10-12

## 2018-10-12 RX ADMIN — PACLITAXEL 135 MG: 6 INJECTION, SOLUTION INTRAVENOUS at 10:50

## 2018-10-12 RX ADMIN — DIPHENHYDRAMINE HYDROCHLORIDE 25 MG: 50 INJECTION, SOLUTION INTRAMUSCULAR; INTRAVENOUS at 09:22

## 2018-10-12 RX ADMIN — SODIUM CHLORIDE 250 ML: 9 INJECTION, SOLUTION INTRAVENOUS at 09:22

## 2018-10-12 RX ADMIN — GRANISETRON HYDROCHLORIDE 1 MG: 1 INJECTION INTRAVENOUS at 09:21

## 2018-10-12 RX ADMIN — TRASTUZUMAB 130 MG: 150 INJECTION, POWDER, LYOPHILIZED, FOR SOLUTION INTRAVENOUS at 10:18

## 2018-10-12 RX ADMIN — DEXAMETHASONE SODIUM PHOSPHATE 12 MG: 10 INJECTION, SOLUTION INTRAMUSCULAR; INTRAVENOUS at 09:38

## 2018-10-12 RX ADMIN — FAMOTIDINE 20 MG: 10 INJECTION, SOLUTION INTRAVENOUS at 09:20

## 2018-10-17 DIAGNOSIS — Z17.1 MALIGNANT NEOPLASM OF OVERLAPPING SITES OF RIGHT BREAST IN FEMALE, ESTROGEN RECEPTOR NEGATIVE (HCC): ICD-10-CM

## 2018-10-17 DIAGNOSIS — C50.811 MALIGNANT NEOPLASM OF OVERLAPPING SITES OF RIGHT BREAST IN FEMALE, ESTROGEN RECEPTOR NEGATIVE (HCC): ICD-10-CM

## 2018-10-19 ENCOUNTER — INFUSION (OUTPATIENT)
Dept: ONCOLOGY | Facility: HOSPITAL | Age: 63
End: 2018-10-19

## 2018-10-19 VITALS
RESPIRATION RATE: 20 BRPM | HEART RATE: 94 BPM | SYSTOLIC BLOOD PRESSURE: 137 MMHG | BODY MASS INDEX: 23.89 KG/M2 | DIASTOLIC BLOOD PRESSURE: 90 MMHG | WEIGHT: 143.4 LBS | TEMPERATURE: 97.4 F

## 2018-10-19 DIAGNOSIS — C50.811 MALIGNANT NEOPLASM OF OVERLAPPING SITES OF RIGHT BREAST IN FEMALE, ESTROGEN RECEPTOR NEGATIVE (HCC): Primary | ICD-10-CM

## 2018-10-19 DIAGNOSIS — Z17.1 MALIGNANT NEOPLASM OF OVERLAPPING SITES OF RIGHT BREAST IN FEMALE, ESTROGEN RECEPTOR NEGATIVE (HCC): Primary | ICD-10-CM

## 2018-10-19 DIAGNOSIS — C50.811 MALIGNANT NEOPLASM OF OVERLAPPING SITES OF RIGHT BREAST IN FEMALE, ESTROGEN RECEPTOR NEGATIVE (HCC): ICD-10-CM

## 2018-10-19 DIAGNOSIS — Z45.2 FITTING AND ADJUSTMENT OF VASCULAR CATHETER: ICD-10-CM

## 2018-10-19 DIAGNOSIS — Z17.1 MALIGNANT NEOPLASM OF OVERLAPPING SITES OF RIGHT BREAST IN FEMALE, ESTROGEN RECEPTOR NEGATIVE (HCC): ICD-10-CM

## 2018-10-19 LAB
ALBUMIN SERPL-MCNC: 4.2 G/DL (ref 3.5–5.2)
ALBUMIN/GLOB SERPL: 1.6 G/DL (ref 1.1–2.4)
ALP SERPL-CCNC: 80 U/L (ref 38–116)
ALT SERPL W P-5'-P-CCNC: 35 U/L (ref 0–33)
ANION GAP SERPL CALCULATED.3IONS-SCNC: 10.8 MMOL/L
AST SERPL-CCNC: 22 U/L (ref 0–32)
BASOPHILS # BLD AUTO: 0.06 10*3/MM3 (ref 0–0.1)
BASOPHILS NFR BLD AUTO: 1 % (ref 0–1.1)
BILIRUB SERPL-MCNC: 0.2 MG/DL (ref 0.1–1.2)
BUN BLD-MCNC: 12 MG/DL (ref 6–20)
BUN/CREAT SERPL: 15.8 (ref 7.3–30)
CALCIUM SPEC-SCNC: 9.2 MG/DL (ref 8.5–10.2)
CHLORIDE SERPL-SCNC: 98 MMOL/L (ref 98–107)
CO2 SERPL-SCNC: 29.2 MMOL/L (ref 22–29)
CREAT BLD-MCNC: 0.76 MG/DL (ref 0.6–1.1)
DEPRECATED RDW RBC AUTO: 49.4 FL (ref 37–49)
EOSINOPHIL # BLD AUTO: 0.26 10*3/MM3 (ref 0–0.36)
EOSINOPHIL NFR BLD AUTO: 4.2 % (ref 1–5)
ERYTHROCYTE [DISTWIDTH] IN BLOOD BY AUTOMATED COUNT: 18.1 % (ref 11.7–14.5)
GFR SERPL CREATININE-BSD FRML MDRD: 77 ML/MIN/1.73
GFR SERPL CREATININE-BSD FRML MDRD: 93 ML/MIN/1.73
GLOBULIN UR ELPH-MCNC: 2.7 GM/DL (ref 1.8–3.5)
GLUCOSE BLD-MCNC: 133 MG/DL (ref 74–124)
HCT VFR BLD AUTO: 33.7 % (ref 34–45)
HGB BLD-MCNC: 10.5 G/DL (ref 11.5–14.9)
IMM GRANULOCYTES # BLD: 0.05 10*3/MM3 (ref 0–0.03)
IMM GRANULOCYTES NFR BLD: 0.8 % (ref 0–0.5)
LYMPHOCYTES # BLD AUTO: 1.54 10*3/MM3 (ref 1–3.5)
LYMPHOCYTES NFR BLD AUTO: 25.1 % (ref 20–49)
MCH RBC QN AUTO: 24.8 PG (ref 27–33)
MCHC RBC AUTO-ENTMCNC: 31.2 G/DL (ref 32–35)
MCV RBC AUTO: 79.7 FL (ref 83–97)
MONOCYTES # BLD AUTO: 0.51 10*3/MM3 (ref 0.25–0.8)
MONOCYTES NFR BLD AUTO: 8.3 % (ref 4–12)
NEUTROPHILS # BLD AUTO: 3.71 10*3/MM3 (ref 1.5–7)
NEUTROPHILS NFR BLD AUTO: 60.6 % (ref 39–75)
NRBC BLD MANUAL-RTO: 0.5 /100 WBC (ref 0–0)
PLATELET # BLD AUTO: 361 10*3/MM3 (ref 150–375)
PMV BLD AUTO: 8.4 FL (ref 8.9–12.1)
POTASSIUM BLD-SCNC: 3.4 MMOL/L (ref 3.5–4.7)
PROT SERPL-MCNC: 6.9 G/DL (ref 6.3–8)
RBC # BLD AUTO: 4.23 10*6/MM3 (ref 3.9–5)
SODIUM BLD-SCNC: 138 MMOL/L (ref 134–145)
WBC NRBC COR # BLD: 6.13 10*3/MM3 (ref 4–10)

## 2018-10-19 PROCEDURE — 63710000001 DIPHENHYDRAMINE PER 50 MG: Performed by: NURSE PRACTITIONER

## 2018-10-19 PROCEDURE — 96417 CHEMO IV INFUS EACH ADDL SEQ: CPT | Performed by: INTERNAL MEDICINE

## 2018-10-19 PROCEDURE — 80053 COMPREHEN METABOLIC PANEL: CPT

## 2018-10-19 PROCEDURE — 96375 TX/PRO/DX INJ NEW DRUG ADDON: CPT | Performed by: INTERNAL MEDICINE

## 2018-10-19 PROCEDURE — 25010000002 TRASTUZUMAB PER 10 MG: Performed by: NURSE PRACTITIONER

## 2018-10-19 PROCEDURE — 96413 CHEMO IV INFUSION 1 HR: CPT | Performed by: INTERNAL MEDICINE

## 2018-10-19 PROCEDURE — 85025 COMPLETE CBC W/AUTO DIFF WBC: CPT

## 2018-10-19 PROCEDURE — 25010000002 GRANISETRON PER 100 MCG: Performed by: NURSE PRACTITIONER

## 2018-10-19 PROCEDURE — 25010000002 PACLITAXEL PER 30 MG: Performed by: NURSE PRACTITIONER

## 2018-10-19 PROCEDURE — 25010000003 DEXAMETHASONE SODIUM PHOSPHATE 100 MG/10ML SOLUTION: Performed by: NURSE PRACTITIONER

## 2018-10-19 RX ORDER — GRANISETRON HYDROCHLORIDE 1 MG/ML
1 INJECTION INTRAVENOUS ONCE
Status: COMPLETED | OUTPATIENT
Start: 2018-10-19 | End: 2018-10-19

## 2018-10-19 RX ORDER — SODIUM CHLORIDE 0.9 % (FLUSH) 0.9 %
10 SYRINGE (ML) INJECTION AS NEEDED
Status: DISCONTINUED | OUTPATIENT
Start: 2018-10-19 | End: 2018-10-19 | Stop reason: HOSPADM

## 2018-10-19 RX ORDER — DIPHENHYDRAMINE HCL 25 MG
25 CAPSULE ORAL ONCE
Status: COMPLETED | OUTPATIENT
Start: 2018-10-19 | End: 2018-10-19

## 2018-10-19 RX ORDER — SODIUM CHLORIDE 0.9 % (FLUSH) 0.9 %
10 SYRINGE (ML) INJECTION AS NEEDED
Status: CANCELLED | OUTPATIENT
Start: 2018-10-19

## 2018-10-19 RX ORDER — SODIUM CHLORIDE 9 MG/ML
250 INJECTION, SOLUTION INTRAVENOUS ONCE
Status: COMPLETED | OUTPATIENT
Start: 2018-10-19 | End: 2018-10-19

## 2018-10-19 RX ADMIN — SODIUM CHLORIDE 250 ML: 9 INJECTION, SOLUTION INTRAVENOUS at 09:03

## 2018-10-19 RX ADMIN — TRASTUZUMAB 130 MG: 150 INJECTION, POWDER, LYOPHILIZED, FOR SOLUTION INTRAVENOUS at 09:31

## 2018-10-19 RX ADMIN — SODIUM CHLORIDE, PRESERVATIVE FREE 500 UNITS: 5 INJECTION INTRAVENOUS at 12:15

## 2018-10-19 RX ADMIN — GRANISETRON HYDROCHLORIDE 1 MG: 1 INJECTION INTRAVENOUS at 09:11

## 2018-10-19 RX ADMIN — FAMOTIDINE 20 MG: 10 INJECTION INTRAVENOUS at 09:04

## 2018-10-19 RX ADMIN — PACLITAXEL 135 MG: 6 INJECTION, SOLUTION INTRAVENOUS at 10:07

## 2018-10-19 RX ADMIN — DIPHENHYDRAMINE HYDROCHLORIDE 25 MG: 25 CAPSULE ORAL at 09:04

## 2018-10-19 RX ADMIN — Medication 10 ML: at 12:15

## 2018-10-19 RX ADMIN — DEXAMETHASONE SODIUM PHOSPHATE 12 MG: 10 INJECTION, SOLUTION INTRAMUSCULAR; INTRAVENOUS at 09:03

## 2018-10-22 PROCEDURE — S0354 CANCER TREATMENT PLAN CHANGE: HCPCS | Performed by: INTERNAL MEDICINE

## 2018-10-25 DIAGNOSIS — C50.811 MALIGNANT NEOPLASM OF OVERLAPPING SITES OF RIGHT BREAST IN FEMALE, ESTROGEN RECEPTOR NEGATIVE (HCC): ICD-10-CM

## 2018-10-25 DIAGNOSIS — Z17.1 MALIGNANT NEOPLASM OF OVERLAPPING SITES OF RIGHT BREAST IN FEMALE, ESTROGEN RECEPTOR NEGATIVE (HCC): ICD-10-CM

## 2018-10-26 ENCOUNTER — INFUSION (OUTPATIENT)
Dept: ONCOLOGY | Facility: HOSPITAL | Age: 63
End: 2018-10-26

## 2018-10-26 VITALS
TEMPERATURE: 97.9 F | SYSTOLIC BLOOD PRESSURE: 143 MMHG | BODY MASS INDEX: 24.07 KG/M2 | HEART RATE: 86 BPM | DIASTOLIC BLOOD PRESSURE: 89 MMHG | WEIGHT: 144.5 LBS

## 2018-10-26 DIAGNOSIS — Z17.1 MALIGNANT NEOPLASM OF OVERLAPPING SITES OF RIGHT BREAST IN FEMALE, ESTROGEN RECEPTOR NEGATIVE (HCC): ICD-10-CM

## 2018-10-26 DIAGNOSIS — Z17.1 MALIGNANT NEOPLASM OF OVERLAPPING SITES OF RIGHT BREAST IN FEMALE, ESTROGEN RECEPTOR NEGATIVE (HCC): Primary | ICD-10-CM

## 2018-10-26 DIAGNOSIS — C50.811 MALIGNANT NEOPLASM OF OVERLAPPING SITES OF RIGHT BREAST IN FEMALE, ESTROGEN RECEPTOR NEGATIVE (HCC): ICD-10-CM

## 2018-10-26 DIAGNOSIS — Z45.2 FITTING AND ADJUSTMENT OF VASCULAR CATHETER: ICD-10-CM

## 2018-10-26 DIAGNOSIS — C50.811 MALIGNANT NEOPLASM OF OVERLAPPING SITES OF RIGHT BREAST IN FEMALE, ESTROGEN RECEPTOR NEGATIVE (HCC): Primary | ICD-10-CM

## 2018-10-26 LAB
BASOPHILS # BLD AUTO: 0.05 10*3/MM3 (ref 0–0.1)
BASOPHILS NFR BLD AUTO: 0.8 % (ref 0–1.1)
DEPRECATED RDW RBC AUTO: 50.6 FL (ref 37–49)
EOSINOPHIL # BLD AUTO: 0.17 10*3/MM3 (ref 0–0.36)
EOSINOPHIL NFR BLD AUTO: 2.8 % (ref 1–5)
ERYTHROCYTE [DISTWIDTH] IN BLOOD BY AUTOMATED COUNT: 18.6 % (ref 11.7–14.5)
HCT VFR BLD AUTO: 33 % (ref 34–45)
HGB BLD-MCNC: 10.2 G/DL (ref 11.5–14.9)
IMM GRANULOCYTES # BLD: 0.08 10*3/MM3 (ref 0–0.03)
IMM GRANULOCYTES NFR BLD: 1.3 % (ref 0–0.5)
LYMPHOCYTES # BLD AUTO: 1.32 10*3/MM3 (ref 1–3.5)
LYMPHOCYTES NFR BLD AUTO: 21.5 % (ref 20–49)
MCH RBC QN AUTO: 24.5 PG (ref 27–33)
MCHC RBC AUTO-ENTMCNC: 30.9 G/DL (ref 32–35)
MCV RBC AUTO: 79.3 FL (ref 83–97)
MONOCYTES # BLD AUTO: 0.52 10*3/MM3 (ref 0.25–0.8)
MONOCYTES NFR BLD AUTO: 8.5 % (ref 4–12)
NEUTROPHILS # BLD AUTO: 4 10*3/MM3 (ref 1.5–7)
NEUTROPHILS NFR BLD AUTO: 65.1 % (ref 39–75)
NRBC BLD MANUAL-RTO: 0.7 /100 WBC (ref 0–0)
PLATELET # BLD AUTO: 380 10*3/MM3 (ref 150–375)
PMV BLD AUTO: 8.6 FL (ref 8.9–12.1)
RBC # BLD AUTO: 4.16 10*6/MM3 (ref 3.9–5)
WBC NRBC COR # BLD: 6.14 10*3/MM3 (ref 4–10)

## 2018-10-26 PROCEDURE — 96413 CHEMO IV INFUSION 1 HR: CPT | Performed by: INTERNAL MEDICINE

## 2018-10-26 PROCEDURE — 85025 COMPLETE CBC W/AUTO DIFF WBC: CPT

## 2018-10-26 PROCEDURE — 96417 CHEMO IV INFUS EACH ADDL SEQ: CPT | Performed by: INTERNAL MEDICINE

## 2018-10-26 PROCEDURE — 25010000002 TRASTUZUMAB PER 10 MG: Performed by: INTERNAL MEDICINE

## 2018-10-26 PROCEDURE — 96375 TX/PRO/DX INJ NEW DRUG ADDON: CPT | Performed by: INTERNAL MEDICINE

## 2018-10-26 PROCEDURE — 25010000003 DEXAMETHASONE SODIUM PHOSPHATE 100 MG/10ML SOLUTION: Performed by: INTERNAL MEDICINE

## 2018-10-26 PROCEDURE — 63710000001 DIPHENHYDRAMINE PER 50 MG: Performed by: INTERNAL MEDICINE

## 2018-10-26 PROCEDURE — 25010000002 PACLITAXEL PER 30 MG: Performed by: INTERNAL MEDICINE

## 2018-10-26 RX ORDER — SODIUM CHLORIDE 9 MG/ML
250 INJECTION, SOLUTION INTRAVENOUS ONCE
Status: CANCELLED | OUTPATIENT
Start: 2018-11-02

## 2018-10-26 RX ORDER — GRANISETRON HYDROCHLORIDE 1 MG/ML
1 INJECTION INTRAVENOUS ONCE
Status: CANCELLED
Start: 2018-10-26 | End: 2018-10-26

## 2018-10-26 RX ORDER — DIPHENHYDRAMINE HCL 25 MG
25 CAPSULE ORAL ONCE
Status: CANCELLED | OUTPATIENT
Start: 2018-11-02

## 2018-10-26 RX ORDER — DIPHENHYDRAMINE HCL 25 MG
25 CAPSULE ORAL ONCE
Status: COMPLETED | OUTPATIENT
Start: 2018-10-26 | End: 2018-10-26

## 2018-10-26 RX ORDER — SODIUM CHLORIDE 0.9 % (FLUSH) 0.9 %
10 SYRINGE (ML) INJECTION AS NEEDED
Status: DISCONTINUED | OUTPATIENT
Start: 2018-10-26 | End: 2018-10-26 | Stop reason: HOSPADM

## 2018-10-26 RX ORDER — FAMOTIDINE 10 MG/ML
20 INJECTION, SOLUTION INTRAVENOUS ONCE
Status: CANCELLED | OUTPATIENT
Start: 2018-11-02

## 2018-10-26 RX ORDER — SODIUM CHLORIDE 9 MG/ML
250 INJECTION, SOLUTION INTRAVENOUS ONCE
Status: COMPLETED | OUTPATIENT
Start: 2018-10-26 | End: 2018-10-26

## 2018-10-26 RX ORDER — SODIUM CHLORIDE 0.9 % (FLUSH) 0.9 %
10 SYRINGE (ML) INJECTION AS NEEDED
Status: CANCELLED | OUTPATIENT
Start: 2018-10-26

## 2018-10-26 RX ORDER — GRANISETRON HYDROCHLORIDE 1 MG/ML
1 INJECTION INTRAVENOUS ONCE
Status: CANCELLED
Start: 2018-11-02 | End: 2018-11-02

## 2018-10-26 RX ADMIN — PACLITAXEL 135 MG: 6 INJECTION, SOLUTION INTRAVENOUS at 10:57

## 2018-10-26 RX ADMIN — DIPHENHYDRAMINE HYDROCHLORIDE 25 MG: 25 CAPSULE ORAL at 09:37

## 2018-10-26 RX ADMIN — SODIUM CHLORIDE 250 ML: 900 INJECTION, SOLUTION INTRAVENOUS at 09:30

## 2018-10-26 RX ADMIN — DEXAMETHASONE SODIUM PHOSPHATE 12 MG: 10 INJECTION, SOLUTION INTRAMUSCULAR; INTRAVENOUS at 09:39

## 2018-10-26 RX ADMIN — Medication 10 ML: at 13:07

## 2018-10-26 RX ADMIN — FAMOTIDINE 20 MG: 10 INJECTION INTRAVENOUS at 09:37

## 2018-10-26 RX ADMIN — TRASTUZUMAB 130 MG: 150 INJECTION, POWDER, LYOPHILIZED, FOR SOLUTION INTRAVENOUS at 10:20

## 2018-10-26 RX ADMIN — SODIUM CHLORIDE, PRESERVATIVE FREE 500 UNITS: 5 INJECTION INTRAVENOUS at 13:08

## 2018-11-02 ENCOUNTER — APPOINTMENT (OUTPATIENT)
Dept: ONCOLOGY | Facility: HOSPITAL | Age: 63
End: 2018-11-02

## 2018-11-02 ENCOUNTER — INFUSION (OUTPATIENT)
Dept: ONCOLOGY | Facility: HOSPITAL | Age: 63
End: 2018-11-02

## 2018-11-02 VITALS
WEIGHT: 144.2 LBS | DIASTOLIC BLOOD PRESSURE: 85 MMHG | SYSTOLIC BLOOD PRESSURE: 129 MMHG | HEART RATE: 85 BPM | BODY MASS INDEX: 24.02 KG/M2 | TEMPERATURE: 98.3 F

## 2018-11-02 DIAGNOSIS — Z45.2 FITTING AND ADJUSTMENT OF VASCULAR CATHETER: ICD-10-CM

## 2018-11-02 DIAGNOSIS — Z17.1 MALIGNANT NEOPLASM OF OVERLAPPING SITES OF RIGHT BREAST IN FEMALE, ESTROGEN RECEPTOR NEGATIVE (HCC): Primary | ICD-10-CM

## 2018-11-02 DIAGNOSIS — C50.811 MALIGNANT NEOPLASM OF OVERLAPPING SITES OF RIGHT BREAST IN FEMALE, ESTROGEN RECEPTOR NEGATIVE (HCC): Primary | ICD-10-CM

## 2018-11-02 LAB
BASOPHILS # BLD AUTO: 0.04 10*3/MM3 (ref 0–0.1)
BASOPHILS NFR BLD AUTO: 0.6 % (ref 0–1.1)
DEPRECATED RDW RBC AUTO: 54.4 FL (ref 37–49)
EOSINOPHIL # BLD AUTO: 0.14 10*3/MM3 (ref 0–0.36)
EOSINOPHIL NFR BLD AUTO: 2 % (ref 1–5)
ERYTHROCYTE [DISTWIDTH] IN BLOOD BY AUTOMATED COUNT: 19.4 % (ref 11.7–14.5)
HCT VFR BLD AUTO: 31.8 % (ref 34–45)
HGB BLD-MCNC: 9.9 G/DL (ref 11.5–14.9)
IMM GRANULOCYTES # BLD: 0.1 10*3/MM3 (ref 0–0.03)
IMM GRANULOCYTES NFR BLD: 1.4 % (ref 0–0.5)
LYMPHOCYTES # BLD AUTO: 1.22 10*3/MM3 (ref 1–3.5)
LYMPHOCYTES NFR BLD AUTO: 17.4 % (ref 20–49)
MCH RBC QN AUTO: 24.9 PG (ref 27–33)
MCHC RBC AUTO-ENTMCNC: 31.1 G/DL (ref 32–35)
MCV RBC AUTO: 80.1 FL (ref 83–97)
MONOCYTES # BLD AUTO: 0.76 10*3/MM3 (ref 0.25–0.8)
MONOCYTES NFR BLD AUTO: 10.8 % (ref 4–12)
NEUTROPHILS # BLD AUTO: 4.75 10*3/MM3 (ref 1.5–7)
NEUTROPHILS NFR BLD AUTO: 67.8 % (ref 39–75)
NRBC BLD MANUAL-RTO: 0.3 /100 WBC (ref 0–0)
PLATELET # BLD AUTO: 410 10*3/MM3 (ref 150–375)
PMV BLD AUTO: 8.7 FL (ref 8.9–12.1)
RBC # BLD AUTO: 3.97 10*6/MM3 (ref 3.9–5)
WBC NRBC COR # BLD: 7.01 10*3/MM3 (ref 4–10)

## 2018-11-02 PROCEDURE — 25010000002 GRANISETRON PER 100 MCG: Performed by: INTERNAL MEDICINE

## 2018-11-02 PROCEDURE — 63710000001 DIPHENHYDRAMINE PER 50 MG: Performed by: INTERNAL MEDICINE

## 2018-11-02 PROCEDURE — 25010000002 TRASTUZUMAB PER 10 MG: Performed by: INTERNAL MEDICINE

## 2018-11-02 PROCEDURE — 96417 CHEMO IV INFUS EACH ADDL SEQ: CPT | Performed by: NURSE PRACTITIONER

## 2018-11-02 PROCEDURE — 85025 COMPLETE CBC W/AUTO DIFF WBC: CPT | Performed by: NURSE PRACTITIONER

## 2018-11-02 PROCEDURE — 96413 CHEMO IV INFUSION 1 HR: CPT | Performed by: NURSE PRACTITIONER

## 2018-11-02 PROCEDURE — 25010000002 PACLITAXEL PER 30 MG: Performed by: INTERNAL MEDICINE

## 2018-11-02 PROCEDURE — 96375 TX/PRO/DX INJ NEW DRUG ADDON: CPT | Performed by: NURSE PRACTITIONER

## 2018-11-02 PROCEDURE — 25010000003 DEXAMETHASONE SODIUM PHOSPHATE 100 MG/10ML SOLUTION: Performed by: INTERNAL MEDICINE

## 2018-11-02 RX ORDER — GRANISETRON HYDROCHLORIDE 1 MG/ML
1 INJECTION INTRAVENOUS ONCE
Status: COMPLETED | OUTPATIENT
Start: 2018-11-02 | End: 2018-11-02

## 2018-11-02 RX ORDER — SODIUM CHLORIDE 9 MG/ML
250 INJECTION, SOLUTION INTRAVENOUS ONCE
Status: COMPLETED | OUTPATIENT
Start: 2018-11-02 | End: 2018-11-02

## 2018-11-02 RX ORDER — DIPHENHYDRAMINE HCL 25 MG
25 CAPSULE ORAL ONCE
Status: COMPLETED | OUTPATIENT
Start: 2018-11-02 | End: 2018-11-02

## 2018-11-02 RX ORDER — SODIUM CHLORIDE 0.9 % (FLUSH) 0.9 %
10 SYRINGE (ML) INJECTION AS NEEDED
Status: CANCELLED | OUTPATIENT
Start: 2018-11-02

## 2018-11-02 RX ADMIN — SODIUM CHLORIDE 250 ML: 900 INJECTION, SOLUTION INTRAVENOUS at 09:01

## 2018-11-02 RX ADMIN — DEXAMETHASONE SODIUM PHOSPHATE 12 MG: 10 INJECTION, SOLUTION INTRAMUSCULAR; INTRAVENOUS at 09:06

## 2018-11-02 RX ADMIN — DIPHENHYDRAMINE HYDROCHLORIDE 25 MG: 25 CAPSULE ORAL at 09:01

## 2018-11-02 RX ADMIN — FAMOTIDINE 20 MG: 10 INJECTION, SOLUTION INTRAVENOUS at 09:02

## 2018-11-02 RX ADMIN — PACLITAXEL 135 MG: 6 INJECTION, SOLUTION INTRAVENOUS at 10:24

## 2018-11-02 RX ADMIN — GRANISETRON HYDROCHLORIDE 1 MG: 1 INJECTION INTRAVENOUS at 09:04

## 2018-11-02 RX ADMIN — SODIUM CHLORIDE, PRESERVATIVE FREE 500 UNITS: 5 INJECTION INTRAVENOUS at 12:40

## 2018-11-02 RX ADMIN — TRASTUZUMAB 130 MG: 150 INJECTION, POWDER, LYOPHILIZED, FOR SOLUTION INTRAVENOUS at 09:48

## 2018-11-08 DIAGNOSIS — C50.811 MALIGNANT NEOPLASM OF OVERLAPPING SITES OF RIGHT BREAST IN FEMALE, ESTROGEN RECEPTOR NEGATIVE (HCC): ICD-10-CM

## 2018-11-08 DIAGNOSIS — Z17.1 MALIGNANT NEOPLASM OF OVERLAPPING SITES OF RIGHT BREAST IN FEMALE, ESTROGEN RECEPTOR NEGATIVE (HCC): ICD-10-CM

## 2018-11-09 ENCOUNTER — INFUSION (OUTPATIENT)
Dept: ONCOLOGY | Facility: HOSPITAL | Age: 63
End: 2018-11-09

## 2018-11-09 ENCOUNTER — OFFICE VISIT (OUTPATIENT)
Dept: ONCOLOGY | Facility: CLINIC | Age: 63
End: 2018-11-09

## 2018-11-09 ENCOUNTER — TELEPHONE (OUTPATIENT)
Dept: ONCOLOGY | Facility: HOSPITAL | Age: 63
End: 2018-11-09

## 2018-11-09 VITALS
TEMPERATURE: 97.8 F | RESPIRATION RATE: 16 BRPM | WEIGHT: 142.5 LBS | SYSTOLIC BLOOD PRESSURE: 129 MMHG | OXYGEN SATURATION: 97 % | HEART RATE: 102 BPM | BODY MASS INDEX: 23.74 KG/M2 | HEIGHT: 65 IN | DIASTOLIC BLOOD PRESSURE: 77 MMHG

## 2018-11-09 VITALS — HEART RATE: 86 BPM | DIASTOLIC BLOOD PRESSURE: 85 MMHG | SYSTOLIC BLOOD PRESSURE: 131 MMHG

## 2018-11-09 DIAGNOSIS — Z17.1 MALIGNANT NEOPLASM OF OVERLAPPING SITES OF RIGHT BREAST IN FEMALE, ESTROGEN RECEPTOR NEGATIVE (HCC): ICD-10-CM

## 2018-11-09 DIAGNOSIS — C50.811 MALIGNANT NEOPLASM OF OVERLAPPING SITES OF RIGHT BREAST IN FEMALE, ESTROGEN RECEPTOR NEGATIVE (HCC): ICD-10-CM

## 2018-11-09 DIAGNOSIS — C50.811 MALIGNANT NEOPLASM OF OVERLAPPING SITES OF RIGHT BREAST IN FEMALE, ESTROGEN RECEPTOR NEGATIVE (HCC): Primary | ICD-10-CM

## 2018-11-09 DIAGNOSIS — Z17.1 MALIGNANT NEOPLASM OF OVERLAPPING SITES OF RIGHT BREAST IN FEMALE, ESTROGEN RECEPTOR NEGATIVE (HCC): Primary | ICD-10-CM

## 2018-11-09 DIAGNOSIS — Z45.2 FITTING AND ADJUSTMENT OF VASCULAR CATHETER: ICD-10-CM

## 2018-11-09 DIAGNOSIS — Z45.2 FITTING AND ADJUSTMENT OF VASCULAR CATHETER: Primary | ICD-10-CM

## 2018-11-09 LAB
BASOPHILS # BLD AUTO: 0.06 10*3/MM3 (ref 0–0.1)
BASOPHILS NFR BLD AUTO: 0.8 % (ref 0–1.1)
DEPRECATED RDW RBC AUTO: 56.6 FL (ref 37–49)
EOSINOPHIL # BLD AUTO: 0.14 10*3/MM3 (ref 0–0.36)
EOSINOPHIL NFR BLD AUTO: 2 % (ref 1–5)
ERYTHROCYTE [DISTWIDTH] IN BLOOD BY AUTOMATED COUNT: 20.3 % (ref 11.7–14.5)
HCT VFR BLD AUTO: 31.3 % (ref 34–45)
HGB BLD-MCNC: 9.9 G/DL (ref 11.5–14.9)
IMM GRANULOCYTES # BLD: 0.1 10*3/MM3 (ref 0–0.03)
IMM GRANULOCYTES NFR BLD: 1.4 % (ref 0–0.5)
LYMPHOCYTES # BLD AUTO: 1.32 10*3/MM3 (ref 1–3.5)
LYMPHOCYTES NFR BLD AUTO: 18.6 % (ref 20–49)
MCH RBC QN AUTO: 25.4 PG (ref 27–33)
MCHC RBC AUTO-ENTMCNC: 31.6 G/DL (ref 32–35)
MCV RBC AUTO: 80.3 FL (ref 83–97)
MONOCYTES # BLD AUTO: 0.75 10*3/MM3 (ref 0.25–0.8)
MONOCYTES NFR BLD AUTO: 10.6 % (ref 4–12)
NEUTROPHILS # BLD AUTO: 4.71 10*3/MM3 (ref 1.5–7)
NEUTROPHILS NFR BLD AUTO: 66.6 % (ref 39–75)
NRBC BLD MANUAL-RTO: 0 /100 WBC (ref 0–0)
PLATELET # BLD AUTO: 475 10*3/MM3 (ref 150–375)
PMV BLD AUTO: 8.9 FL (ref 8.9–12.1)
RBC # BLD AUTO: 3.9 10*6/MM3 (ref 3.9–5)
WBC NRBC COR # BLD: 7.08 10*3/MM3 (ref 4–10)

## 2018-11-09 PROCEDURE — 99215 OFFICE O/P EST HI 40 MIN: CPT | Performed by: INTERNAL MEDICINE

## 2018-11-09 PROCEDURE — 25010000003 DEXAMETHASONE SODIUM PHOSPHATE 100 MG/10ML SOLUTION: Performed by: INTERNAL MEDICINE

## 2018-11-09 PROCEDURE — 96413 CHEMO IV INFUSION 1 HR: CPT | Performed by: INTERNAL MEDICINE

## 2018-11-09 PROCEDURE — 96417 CHEMO IV INFUS EACH ADDL SEQ: CPT | Performed by: INTERNAL MEDICINE

## 2018-11-09 PROCEDURE — 63710000001 DIPHENHYDRAMINE PER 50 MG: Performed by: INTERNAL MEDICINE

## 2018-11-09 PROCEDURE — 96375 TX/PRO/DX INJ NEW DRUG ADDON: CPT | Performed by: INTERNAL MEDICINE

## 2018-11-09 PROCEDURE — 25010000002 TRASTUZUMAB PER 10 MG: Performed by: INTERNAL MEDICINE

## 2018-11-09 PROCEDURE — 85025 COMPLETE CBC W/AUTO DIFF WBC: CPT

## 2018-11-09 PROCEDURE — 25010000002 GRANISETRON PER 100 MCG: Performed by: INTERNAL MEDICINE

## 2018-11-09 PROCEDURE — 25010000002 PACLITAXEL PER 30 MG: Performed by: INTERNAL MEDICINE

## 2018-11-09 RX ORDER — FAMOTIDINE 10 MG/ML
20 INJECTION, SOLUTION INTRAVENOUS ONCE
Status: CANCELLED | OUTPATIENT
Start: 2018-12-07

## 2018-11-09 RX ORDER — GRANISETRON HYDROCHLORIDE 1 MG/ML
1 INJECTION INTRAVENOUS ONCE
Status: CANCELLED
Start: 2018-12-28 | End: 2018-12-28

## 2018-11-09 RX ORDER — GRANISETRON HYDROCHLORIDE 1 MG/ML
1 INJECTION INTRAVENOUS ONCE
Status: CANCELLED
Start: 2018-12-14 | End: 2018-12-14

## 2018-11-09 RX ORDER — GRANISETRON HYDROCHLORIDE 1 MG/ML
1 INJECTION INTRAVENOUS ONCE
Status: CANCELLED
Start: 2018-12-21 | End: 2018-12-21

## 2018-11-09 RX ORDER — DIPHENHYDRAMINE HCL 25 MG
25 CAPSULE ORAL ONCE
Status: CANCELLED | OUTPATIENT
Start: 2018-11-09

## 2018-11-09 RX ORDER — SODIUM CHLORIDE 9 MG/ML
250 INJECTION, SOLUTION INTRAVENOUS ONCE
Status: CANCELLED | OUTPATIENT
Start: 2018-11-23

## 2018-11-09 RX ORDER — FAMOTIDINE 10 MG/ML
20 INJECTION, SOLUTION INTRAVENOUS ONCE
Status: CANCELLED | OUTPATIENT
Start: 2018-12-28

## 2018-11-09 RX ORDER — FAMOTIDINE 10 MG/ML
20 INJECTION, SOLUTION INTRAVENOUS ONCE
Status: CANCELLED | OUTPATIENT
Start: 2018-11-30

## 2018-11-09 RX ORDER — FAMOTIDINE 10 MG/ML
20 INJECTION, SOLUTION INTRAVENOUS ONCE
Status: CANCELLED | OUTPATIENT
Start: 2018-11-23

## 2018-11-09 RX ORDER — SODIUM CHLORIDE 9 MG/ML
250 INJECTION, SOLUTION INTRAVENOUS ONCE
Status: CANCELLED | OUTPATIENT
Start: 2018-12-07

## 2018-11-09 RX ORDER — DIPHENHYDRAMINE HCL 25 MG
25 CAPSULE ORAL ONCE
Status: COMPLETED | OUTPATIENT
Start: 2018-11-09 | End: 2018-11-09

## 2018-11-09 RX ORDER — SODIUM CHLORIDE 9 MG/ML
250 INJECTION, SOLUTION INTRAVENOUS ONCE
Status: COMPLETED | OUTPATIENT
Start: 2018-11-09 | End: 2018-11-09

## 2018-11-09 RX ORDER — FAMOTIDINE 10 MG/ML
20 INJECTION, SOLUTION INTRAVENOUS ONCE
Status: CANCELLED | OUTPATIENT
Start: 2018-12-21

## 2018-11-09 RX ORDER — SODIUM CHLORIDE 9 MG/ML
250 INJECTION, SOLUTION INTRAVENOUS ONCE
Status: CANCELLED | OUTPATIENT
Start: 2019-01-04

## 2018-11-09 RX ORDER — SODIUM CHLORIDE 0.9 % (FLUSH) 0.9 %
10 SYRINGE (ML) INJECTION AS NEEDED
Status: DISCONTINUED | OUTPATIENT
Start: 2018-11-09 | End: 2018-11-09 | Stop reason: HOSPADM

## 2018-11-09 RX ORDER — GRANISETRON HYDROCHLORIDE 1 MG/ML
1 INJECTION INTRAVENOUS ONCE
Status: CANCELLED
Start: 2018-11-16 | End: 2018-11-16

## 2018-11-09 RX ORDER — ESZOPICLONE 3 MG/1
3 TABLET, FILM COATED ORAL
Refills: 0 | COMMUNITY
Start: 2018-10-12 | End: 2018-11-09 | Stop reason: SDUPTHER

## 2018-11-09 RX ORDER — FAMOTIDINE 10 MG/ML
20 INJECTION, SOLUTION INTRAVENOUS ONCE
Status: CANCELLED | OUTPATIENT
Start: 2019-01-04

## 2018-11-09 RX ORDER — GRANISETRON HYDROCHLORIDE 1 MG/ML
1 INJECTION INTRAVENOUS ONCE
Status: CANCELLED
Start: 2018-11-09 | End: 2018-11-09

## 2018-11-09 RX ORDER — SODIUM CHLORIDE 9 MG/ML
250 INJECTION, SOLUTION INTRAVENOUS ONCE
Status: CANCELLED | OUTPATIENT
Start: 2018-11-30

## 2018-11-09 RX ORDER — SODIUM CHLORIDE 9 MG/ML
250 INJECTION, SOLUTION INTRAVENOUS ONCE
Status: CANCELLED | OUTPATIENT
Start: 2018-12-21

## 2018-11-09 RX ORDER — SODIUM CHLORIDE 9 MG/ML
250 INJECTION, SOLUTION INTRAVENOUS ONCE
Status: CANCELLED | OUTPATIENT
Start: 2018-12-28

## 2018-11-09 RX ORDER — GRANISETRON HYDROCHLORIDE 1 MG/ML
1 INJECTION INTRAVENOUS ONCE
Status: CANCELLED
Start: 2018-11-23 | End: 2018-11-23

## 2018-11-09 RX ORDER — FAMOTIDINE 10 MG/ML
20 INJECTION, SOLUTION INTRAVENOUS ONCE
Status: CANCELLED | OUTPATIENT
Start: 2018-11-16

## 2018-11-09 RX ORDER — GRANISETRON HYDROCHLORIDE 1 MG/ML
1 INJECTION INTRAVENOUS ONCE
Status: CANCELLED
Start: 2019-01-04 | End: 2019-01-04

## 2018-11-09 RX ORDER — GRANISETRON HYDROCHLORIDE 1 MG/ML
1 INJECTION INTRAVENOUS ONCE
Status: CANCELLED
Start: 2018-12-07 | End: 2018-12-07

## 2018-11-09 RX ORDER — FAMOTIDINE 10 MG/ML
20 INJECTION, SOLUTION INTRAVENOUS ONCE
Status: CANCELLED | OUTPATIENT
Start: 2018-11-09

## 2018-11-09 RX ORDER — SODIUM CHLORIDE 0.9 % (FLUSH) 0.9 %
10 SYRINGE (ML) INJECTION AS NEEDED
Status: CANCELLED | OUTPATIENT
Start: 2018-11-09

## 2018-11-09 RX ORDER — SODIUM CHLORIDE 9 MG/ML
250 INJECTION, SOLUTION INTRAVENOUS ONCE
Status: CANCELLED | OUTPATIENT
Start: 2018-11-16

## 2018-11-09 RX ORDER — GRANISETRON HYDROCHLORIDE 1 MG/ML
1 INJECTION INTRAVENOUS ONCE
Status: CANCELLED
Start: 2018-11-30 | End: 2018-11-30

## 2018-11-09 RX ORDER — SODIUM CHLORIDE 9 MG/ML
250 INJECTION, SOLUTION INTRAVENOUS ONCE
Status: CANCELLED | OUTPATIENT
Start: 2018-12-14

## 2018-11-09 RX ORDER — FAMOTIDINE 10 MG/ML
20 INJECTION, SOLUTION INTRAVENOUS ONCE
Status: CANCELLED | OUTPATIENT
Start: 2018-12-14

## 2018-11-09 RX ORDER — ESZOPICLONE 3 MG/1
3 TABLET, FILM COATED ORAL
Qty: 30 TABLET | Refills: 0 | Status: SHIPPED | OUTPATIENT
Start: 2018-11-09 | End: 2019-03-29

## 2018-11-09 RX ORDER — SODIUM CHLORIDE 9 MG/ML
250 INJECTION, SOLUTION INTRAVENOUS ONCE
Status: CANCELLED | OUTPATIENT
Start: 2018-11-09

## 2018-11-09 RX ORDER — GRANISETRON HYDROCHLORIDE 1 MG/ML
1 INJECTION INTRAVENOUS ONCE
Status: COMPLETED | OUTPATIENT
Start: 2018-11-09 | End: 2018-11-09

## 2018-11-09 RX ADMIN — TRASTUZUMAB 130 MG: 150 INJECTION, POWDER, LYOPHILIZED, FOR SOLUTION INTRAVENOUS at 11:33

## 2018-11-09 RX ADMIN — SODIUM CHLORIDE, PRESERVATIVE FREE 500 UNITS: 5 INJECTION INTRAVENOUS at 14:18

## 2018-11-09 RX ADMIN — SODIUM CHLORIDE 250 ML: 9 INJECTION, SOLUTION INTRAVENOUS at 11:04

## 2018-11-09 RX ADMIN — DIPHENHYDRAMINE HYDROCHLORIDE 25 MG: 25 CAPSULE ORAL at 11:04

## 2018-11-09 RX ADMIN — PACLITAXEL 120 MG: 6 INJECTION, SOLUTION INTRAVENOUS at 12:08

## 2018-11-09 RX ADMIN — GRANISETRON HYDROCHLORIDE 1 MG: 1 INJECTION INTRAVENOUS at 11:05

## 2018-11-09 RX ADMIN — DEXAMETHASONE SODIUM PHOSPHATE 12 MG: 10 INJECTION, SOLUTION INTRAMUSCULAR; INTRAVENOUS at 11:04

## 2018-11-09 RX ADMIN — FAMOTIDINE 20 MG: 10 INJECTION, SOLUTION INTRAVENOUS at 11:06

## 2018-11-09 NOTE — TELEPHONE ENCOUNTER
Script was printed. Called it into patients pharmacy and notified pt.     ----- Message from Agustina Mariscal sent at 11/9/2018  3:32 PM EST -----  Contact: [phone]  Pt says pharmacy did not get her lunensta

## 2018-11-09 NOTE — PROGRESS NOTES
Subjective     REASON FOR CONSULTATION:    1.Multifocal right breast cancer post bilateral mastectomies- T1b N0+-10:00 tumor 8 mm grade 1 ER/MI positive HER-2 negative Oncotype pending    2.  5:00 tumor right breast ER/MI negative HER-2 positive  3.  Negative genetic testing  4.  Adequate echo weekly Taxol Herceptin started on 9/21/18                          Referring physician Kwaku Gudino M.D.      History of Present Illness : This is a 62-year-old lady with a multifocal right breast cancer T1b N0+ ER/MI negative HER-2 positive and the second T1b ER/MI positive HER-2 negative tumor here for 8th  dose of Taxol Herceptin on a weekly schedule.      She has developed mild neuropathy in her fingertips and toes neuropathy in her hair is thinning slightly despite the Paxman system but she is happy that morbid is not dropping out    Repeat Oncotype on her second tumor is 4 and she is reassured that this is low risk    Right implant was resutured for the third time and there is no signs of infection but some of the fluid had to be taken out of her implant to prevent it from being so tightly stretched        Past Medical History:   Diagnosis Date   • Environmental allergies    • GERD (gastroesophageal reflux disease)    • History of foreign travel     [place]   • Hypertension    • Malignant neoplasm of overlapping sites of right breast in female, estrogen receptor positive (CMS/HCC) 7/18/2018        Past Surgical History:   Procedure Laterality Date   • BREAST BIOPSY Right 2018    Malignant   • BREAST RECONSTRUCTION Bilateral 8/1/2018    Procedure: BILATERAL BREAST TISSUE EXPANDER INSERTION WITH ALLODERM;  Surgeon: Waterhouse, Maurine, MD;  Location: [facility] MAIN OR;  Service: Plastics   • COLONOSCOPY N/A 3/24/2017    Procedure: COLONOSCOPY TO CECUM AND TI;  Surgeon: Aleksander Bella MD;  Location: [facility] ENDOSCOPY;  Service:    • ENDOSCOPY N/A 3/24/2017    Procedure: ESOPHAGOGASTRODUODENOSCOPY;  Surgeon: Aleksander OLEARY  MD J[place];  Location: [facility] ENDOSCOPY;  Service:    • HYSTERECTOMY  1989   • MASTECTOMY W/ SENTINEL NODE BIOPSY Bilateral 8/1/2018    Procedure: BILATERAL TOTAL MASTECTOMY WITH RIGHT SENTINEL LYMPH MARTINEZ BIOPSY;  Surgeon: Kwaku Gudino MD;  Location: [facility] OR;  Service: General   • OOPHORECTOMY Bilateral 1989   • VENOUS ACCESS DEVICE (PORT) INSERTION Left 9/14/2018    Procedure: INSERTION VENOUS ACCESS DEVICE;  Surgeon: Kwaku Gudino MD;  Location: 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  The patient had never had a previous mammogram and was referred for evaluation at which time she was found to have a 4.2 cm mass in the upper outer quadrant of the right breast at the 9:30 position associated with pleomorphic calcifications in the lower inner quadrant also felt to be suspicious left breast was benign.  Patient underwent ultrasound guided biopsy on 6/15/18 with the biopsy at the 9:30 position with clip placement showing invasive mammary carcinoma ductal type low-grade ER % AK 9800% HER-2 2+ negative by fish.  Patient also underwent biopsy at the 4:00 position which showed a microscopic focus of invasive ductal carcinoma felt to be probably a lymphatic space involvement and was too small to do additional testing and a clip was placed.  Patient went on to have bilateral breast MRIs and was referred to Dr. Gudino MRIs confirmed 2 areas of abnormality at the breast-lower inner quadrant showed a large hematoma with a clip identified and there is extensive clumped abnormal enhancement beginning superior to the biopsy cavity and extending laterally to the entire upper half of the right breast into the upper outer quadrant measuring 1.2 x 1.4 cm and extending over 7 cm.  A biopsy clip was identified in this area the abnormal  enhancement focally abutted the pectoralis muscle medially with no invasion through few borderline enlarged right axillary nodes with normal morphology no internal mammary adenopathy was noted left breast was benign.  Patient went on to have bilateral mastectomies the final path report showed 2 separate primaries in the breast associated with extensive high-grade DCIS measuring 6 cm  The lower inner quadrant lesion measured 1 cm was a grade 3 invasive ductal carcinoma ER 0 LA 0 HER-2 3+ and the lesion at the 9:30 position was a grade 1 invasive ductal carcinoma measuring 8 mm strongly ER/LA positive and HER-2 negative based on preop testing.  3 sentinel nodes were obtained 1 showed isolated tumor cells.  Margins showed  Positivity with DCIS in the inferior medial margin and was close to the deep margin (2mm) and inferior superficial margin(3mm).  Lymphovascular invasion was present.  Left breast is benign    Postoperatively she's done well and had her expanders filled and is here today to discuss adjuvant treatment.  Unfortunately she did not know about the testing on the second tumor at surgery and thought she had a grade 1 ER/LA positive HER-2 negative tumor and was very surprised when I told her the prior markers on the second tumor.    She is  0 para 0 menarche was at age 13 menopause at age 33 when she had a hysterectomy and oophorectomy.  She's been on hormone replacement since age of 33 until her recent diagnosis .    Family history is positive for mother having colon cancer age 72 maternal aunt with breast cancer in her 60s and another maternal aunt with uterine cancer in her 70s.  She's had negative genetic testing but the extended panel is being done and is pending    We discussed treatment plans and I have scheduled her for Chemotherapy education with tentative plans for weekly Taxol Herceptin for 12 weeks followed by Herceptin for a year  We will do an Oncotype DX score on the hormone positive  tumor which is low-grade unlikely to be high risk    We discussed whether or not radiation would be indicated and except for the fact that there is a positive margin with high-grade DCIS normally she would not need radiation but I recommended a consultation because of the margins and the high grade nature of the DCIS.    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  She is presented at the multidiscipline conference and everybody felt comfortable with weekly Taxol Herceptin for 12 weeks followed by Herceptin for a year and Oncotype testing on the second tumor for which she would receive an aromatase inhibitor for 5 years unless it was high risk which is very unlikely    She is here today having had an echo which showed an adequate ejection fraction  Of 61% and she had a port placed and is here to start treatment.    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    Unfortunately the wrong specimen was sent for Oncotype testing and ER negative specimen was sent and I talked to the pathologist and they will send the correct specimen for  Oncotype DX testing with no talked to the patient.    That he has no questions and is happy to finally get started with therapy.    Genetic testing results are negative using the [employer]  panel  -      Current Outpatient Prescriptions on File Prior to Visit   Medication Sig Dispense Refill   • chlorthalidone (HYGROTON) 25 MG tablet Take 25 mg by mouth Every Night.     • Ciprofloxacin (CIPRO PO) Take 1,000 mg by mouth Daily. For 5 days     • fexofenadine-pseudoephedrine (ALLEGRA-D 24) 180-240 MG per 24 hr tablet Take 1 tablet by mouth Daily.     • omeprazole-sodium bicarbonate (ZEGERID)  MG per capsule Take 1 capsule by mouth Every Night.     • ondansetron (ZOFRAN) 8 MG tablet Take 1 tablet by mouth Every 8 (Eight) Hours As Needed for Nausea or Vomiting. 30 tablet 2   • raNITIdine (ZANTAC) 150 MG tablet Take 150 mg by mouth 2 (Two) Times a Day.     • Turmeric 500 MG tablet Take 1,000 mg by mouth Daily.     • HYDROcodone-acetaminophen (NORCO) 5-325 MG per tablet Take 1-2 tablets by mouth Every 4 (Four) Hours As Needed (Pain). 8 tablet 0     No current facility-administered medications on file prior to visit.         ALLERGIES:    Allergies   Allergen Reactions   • Moxifloxacin Anaphylaxis and Rash   • Aloxi [Palonosetron Hcl] Itching   • Latex Rash     Added based on information entered during log entry, please review and add reactions, type, and severity as needed        Social History     Social History   • Marital status:      Spouse name: FLORINA Gross   • Number of children: 0   • Years of education: Graduate School     Occupational History   • Physician [employer]     Social History Main Topics   • Smoking status: Never Smoker   • Smokeless tobacco: Never Used   • Alcohol use Yes     1 Glasses of wine per week   • Drug use: No     Other Topics Concern   • Not on file        Family History   Problem Relation Age of Onset   • Colon cancer Mother 72   • Breast cancer Maternal  "Aunt 60   • Uterine cancer Maternal Aunt 75   • Stroke Father    • Malig Hyperthermia Neg Hx         Review of Systems   Constitutional: Positive for diaphoresis.   Gastrointestinal:        Chronic indigestion   Endocrine: Positive for heat intolerance.   Neurological: 1-rd3rdgrdrrdarddrderd numbness.        Objective     Vitals:    11/09/18 0955   Height: 165 cm (64.96\")     Current Status 11/9/2018   ECOG score 0       Physical Exam    GENERAL:  Well-developed, well-nourished in no acute distress.   SKIN:  Warm, dry without rashes, purpura or petechiae.  EYES:  Pupils equal, round and reactive to light.  EOMs intact.  Conjunctivae normal.  EARS:  Hearing intact.  NOSE:  Septum midline.  No excoriations or nasal discharge.  MOUTH:  Tongue is well-papillated; no stomatitis or ulcers.  Lips normal.  THROAT:  Oropharynx without lesions or exudates.  NECK:  Supple with good range of motion; no thyromegaly or masses, no JVD.  LYMPHATICS:  No cervical, supraclavicular, axillary or inguinal adenopathy.  CHEST:  Lungs clear to auscultation. Good airflow.  BREASTS: biLateral mastectomies with expanders in place-the middle of her incision on the right breast shows resutures with no active infection   CARDIAC:  Regular rate and rhythm without murmurs, rubs or gallops. Normal S1,S2.  ABDOMEN:  Soft, nontender with no hepatosplenomegaly or masses.  EXTREMITIES:  No clubbing, cyanosis or edema.  NEUROLOGICAL:  Cranial Nerves II-XII grossly intact.  No focal neurological deficits.  PSYCHIATRIC:  Normal affect and mood.        RECENT LABS:  Hematology WBC   Date Value Ref Range Status   11/09/2018 7.08 4.00 - 10.00 10*3/mm3 Final     RBC   Date Value Ref Range Status   11/09/2018 3.90 3.90 - 5.00 10*6/mm3 Final     Hemoglobin   Date Value Ref Range Status   11/09/2018 9.9 (L) 11.5 - 14.9 g/dL Final     Hematocrit   Date Value Ref Range Status   11/09/2018 31.3 (L) 34.0 - 45.0 % Final     Platelets   Date Value Ref Range Status   11/09/2018 475 " (H) 150 - 375 10*3/mm3 Final        SYNOPTIC REPORT (Based on [facility] Cancer Case Summary, version January 2018):                Procedure: Total mastectomy.               Specimen/tumor laterality: Right.                Tumor site: Lower inner quadrant and central/inferior breast.                 Tumor size (greastest dimension of largest invasive focus): 1 cm (lower inner                       quadrant tumor).               Histologic type: Invasive carcinoma of no special type (ductal, not otherwise specified).                 Histologic grade (Rutledge Histologic Score):                              Glandular (acinar)/tubular differentiation: Score 3.                             Nuclear pleomorphism: Score 3.                             Mitotic rate: Score 2.                            Overall grade: Grade 3 (score 8 of 9).                            NOTE: The above Rutledge Histologic Score applies to the largest and                                    highest grade tumor (lower inner quadrant tumor).  The tumor in the                                    central/inferior breast is intermediate grade (tubular score 3, nuclear                                    score 2, mitotic score 1).               Tumor focality: Multiple foci of invasive carcinoma.                             Number of foci: 2.                              Sizes of individual foci: 1 cm (lower inner quadrant tumor) and approximately                                    0.8 cm (central/inferior tumor).               Ductal carcinoma in situ (DCIS): Present, positive for extensive intraductal component.                            Size (extent) of DCIS: Approximately greater than 6 cm (estimated based                                    on gross and microscopic findings).                            Architectural pattern: Solid and cribriform.                            Nuclear grade: Grade III (high).                            Necrosis: Central  comedonecrosis.               Lobular carcinoma in situ (LCIS): No LCIS in specimen.               Margins:                             Invasive carcinoma margins: Uninvolved by invasive carcinoma.                                          Distance from closest margin: 2 mm (deep margin).                             DCIS margins: Positive for DCIS (inferior medial margin [lower inner quadrant]).                                          Additional DCIS margins: DCIS extends to within 2 mm of deep margin                                                 and to within 3 mm of inferior superficial soft tissue margin.  Atypical                                                 ductal hyperplasia is focally present at the deep margin.               Regional lymph nodes: Involved by tumor cells.                             Number of lymph nodes with macrometastases: 0.                            Number of lymph nodes with micrometastases: 0.                            Number of lymph nodes with isolated tumor cells: 1.                            Number of lymph nodes examined: 3.                             Number of sentinel lymph nodes examined: 3.                Treatment effect: No known presurgical therapy.                Lymphovascular invasion: Present.               Dermal lymphovascular invasion: Not identified.                Pathologic stage classification:                             TNM descriptors: m (multiple foci of invasive carcinoma).                             Primary tumor: pT1b.                            Regional lymph nodes: pN0(i+)(sn).                             Distant metastasis: Not applicable.                Additional pathologic findings:                              Ductal hyperplasia of the usual type.                              Fibroinflammatory changes consistent with sites of prior biopsy (2 prior biopsy                                    sites).                 Ancillary studies:ER ND  negative HER-2 positive lower inner quadrant tumor       Assessment/Plan   1.  Multifocal right breast cancer T1 BN(ic)-isolated tumor cells medial tumor being grade 3 ER/OH negative HER-2 3+ lateral tumor being grade 1 ER/OH positive HER-2 2+ negative fish Oncotype score of 4  2.  Positive family history of multiple malignancies genetic testing negative  3.  Oncotype DX score sent on the wrong tumor will be recent on 9/21/18 final score 4  4.  Wound dehiscence no signs of infection resutured    Plan  1.  Taxol Herceptin #8 today with a 15% dose reduction  2 [employer]man cooling system  3.  She will skip the week of Thanksgiving and she is going to be out of town and we will see her back in 3-4 weeks.              History of Present Illness    Review of Systems   Constitutional: Positive for fatigue (11/09/2018).   HENT: Nosebleeds: 11/09/2018.    Respiratory: Negative.    Cardiovascular: Negative.    Gastrointestinal: Positive for constipation (11/09/2018).   Genitourinary: Positive for difficulty urinating (incontenience 11/09/2018).   Musculoskeletal: Positive for myalgias (myscle ache all over 11/09/2018).   Neurological: Positive for numbness (Fingertips and toes persistent).   Psychiatric/Behavioral: Positive for sleep disturbance (11/09/2018).    Physical Exam

## 2018-11-15 RX ORDER — DIPHENHYDRAMINE HCL 25 MG
25 CAPSULE ORAL ONCE
Status: CANCELLED | OUTPATIENT
Start: 2018-11-16

## 2018-11-16 ENCOUNTER — INFUSION (OUTPATIENT)
Dept: ONCOLOGY | Facility: HOSPITAL | Age: 63
End: 2018-11-16

## 2018-11-16 VITALS
DIASTOLIC BLOOD PRESSURE: 86 MMHG | TEMPERATURE: 98.2 F | SYSTOLIC BLOOD PRESSURE: 133 MMHG | BODY MASS INDEX: 23.88 KG/M2 | WEIGHT: 143.3 LBS | HEART RATE: 87 BPM

## 2018-11-16 DIAGNOSIS — C50.811 MALIGNANT NEOPLASM OF OVERLAPPING SITES OF RIGHT BREAST IN FEMALE, ESTROGEN RECEPTOR NEGATIVE (HCC): ICD-10-CM

## 2018-11-16 DIAGNOSIS — C50.811 MALIGNANT NEOPLASM OF OVERLAPPING SITES OF RIGHT BREAST IN FEMALE, ESTROGEN RECEPTOR NEGATIVE (HCC): Primary | ICD-10-CM

## 2018-11-16 DIAGNOSIS — Z17.1 MALIGNANT NEOPLASM OF OVERLAPPING SITES OF RIGHT BREAST IN FEMALE, ESTROGEN RECEPTOR NEGATIVE (HCC): ICD-10-CM

## 2018-11-16 DIAGNOSIS — Z17.1 MALIGNANT NEOPLASM OF OVERLAPPING SITES OF RIGHT BREAST IN FEMALE, ESTROGEN RECEPTOR NEGATIVE (HCC): Primary | ICD-10-CM

## 2018-11-16 DIAGNOSIS — Z45.2 FITTING AND ADJUSTMENT OF VASCULAR CATHETER: ICD-10-CM

## 2018-11-16 LAB
ALBUMIN SERPL-MCNC: 4 G/DL (ref 3.5–5.2)
ALBUMIN/GLOB SERPL: 1.3 G/DL (ref 1.1–2.4)
ALP SERPL-CCNC: 101 U/L (ref 38–116)
ALT SERPL W P-5'-P-CCNC: 30 U/L (ref 0–33)
ANION GAP SERPL CALCULATED.3IONS-SCNC: 14.3 MMOL/L
AST SERPL-CCNC: 24 U/L (ref 0–32)
BASOPHILS # BLD AUTO: 0.07 10*3/MM3 (ref 0–0.1)
BASOPHILS NFR BLD AUTO: 1 % (ref 0–1.1)
BILIRUB SERPL-MCNC: 0.2 MG/DL (ref 0.1–1.2)
BUN BLD-MCNC: 15 MG/DL (ref 6–20)
BUN/CREAT SERPL: 20 (ref 7.3–30)
CALCIUM SPEC-SCNC: 9.3 MG/DL (ref 8.5–10.2)
CHLORIDE SERPL-SCNC: 97 MMOL/L (ref 98–107)
CO2 SERPL-SCNC: 26.7 MMOL/L (ref 22–29)
CREAT BLD-MCNC: 0.75 MG/DL (ref 0.6–1.1)
DEPRECATED RDW RBC AUTO: 59.5 FL (ref 37–49)
EOSINOPHIL # BLD AUTO: 0.18 10*3/MM3 (ref 0–0.36)
EOSINOPHIL NFR BLD AUTO: 2.6 % (ref 1–5)
ERYTHROCYTE [DISTWIDTH] IN BLOOD BY AUTOMATED COUNT: 20.8 % (ref 11.7–14.5)
GFR SERPL CREATININE-BSD FRML MDRD: 78 ML/MIN/1.73
GFR SERPL CREATININE-BSD FRML MDRD: 95 ML/MIN/1.73
GLOBULIN UR ELPH-MCNC: 3 GM/DL (ref 1.8–3.5)
GLUCOSE BLD-MCNC: 134 MG/DL (ref 74–124)
HCT VFR BLD AUTO: 30.7 % (ref 34–45)
HGB BLD-MCNC: 9.6 G/DL (ref 11.5–14.9)
IMM GRANULOCYTES # BLD: 0.09 10*3/MM3 (ref 0–0.03)
IMM GRANULOCYTES NFR BLD: 1.3 % (ref 0–0.5)
LYMPHOCYTES # BLD AUTO: 1.21 10*3/MM3 (ref 1–3.5)
LYMPHOCYTES NFR BLD AUTO: 17.8 % (ref 20–49)
MCH RBC QN AUTO: 25.3 PG (ref 27–33)
MCHC RBC AUTO-ENTMCNC: 31.3 G/DL (ref 32–35)
MCV RBC AUTO: 81 FL (ref 83–97)
MONOCYTES # BLD AUTO: 0.68 10*3/MM3 (ref 0.25–0.8)
MONOCYTES NFR BLD AUTO: 10 % (ref 4–12)
NEUTROPHILS # BLD AUTO: 4.57 10*3/MM3 (ref 1.5–7)
NEUTROPHILS NFR BLD AUTO: 67.3 % (ref 39–75)
NRBC BLD MANUAL-RTO: 0.3 /100 WBC (ref 0–0)
PLATELET # BLD AUTO: 409 10*3/MM3 (ref 150–375)
PMV BLD AUTO: 8.4 FL (ref 8.9–12.1)
POTASSIUM BLD-SCNC: 3.9 MMOL/L (ref 3.5–4.7)
PROT SERPL-MCNC: 7 G/DL (ref 6.3–8)
RBC # BLD AUTO: 3.79 10*6/MM3 (ref 3.9–5)
SODIUM BLD-SCNC: 138 MMOL/L (ref 134–145)
WBC NRBC COR # BLD: 6.8 10*3/MM3 (ref 4–10)

## 2018-11-16 PROCEDURE — 25010000002 TRASTUZUMAB PER 10 MG: Performed by: INTERNAL MEDICINE

## 2018-11-16 PROCEDURE — 25010000002 PACLITAXEL PER 30 MG: Performed by: INTERNAL MEDICINE

## 2018-11-16 PROCEDURE — 63710000001 DIPHENHYDRAMINE PER 50 MG: Performed by: INTERNAL MEDICINE

## 2018-11-16 PROCEDURE — 25010000002 GRANISETRON PER 100 MCG: Performed by: INTERNAL MEDICINE

## 2018-11-16 PROCEDURE — 25010000003 DEXAMETHASONE SODIUM PHOSPHATE 100 MG/10ML SOLUTION: Performed by: INTERNAL MEDICINE

## 2018-11-16 PROCEDURE — 96417 CHEMO IV INFUS EACH ADDL SEQ: CPT | Performed by: INTERNAL MEDICINE

## 2018-11-16 PROCEDURE — 96375 TX/PRO/DX INJ NEW DRUG ADDON: CPT | Performed by: INTERNAL MEDICINE

## 2018-11-16 PROCEDURE — 96413 CHEMO IV INFUSION 1 HR: CPT | Performed by: INTERNAL MEDICINE

## 2018-11-16 PROCEDURE — 80053 COMPREHEN METABOLIC PANEL: CPT

## 2018-11-16 PROCEDURE — 85025 COMPLETE CBC W/AUTO DIFF WBC: CPT

## 2018-11-16 RX ORDER — GRANISETRON HYDROCHLORIDE 1 MG/ML
1 INJECTION INTRAVENOUS ONCE
Status: COMPLETED | OUTPATIENT
Start: 2018-11-16 | End: 2018-11-16

## 2018-11-16 RX ORDER — SODIUM CHLORIDE 9 MG/ML
250 INJECTION, SOLUTION INTRAVENOUS ONCE
Status: COMPLETED | OUTPATIENT
Start: 2018-11-16 | End: 2018-11-16

## 2018-11-16 RX ORDER — DIPHENHYDRAMINE HCL 25 MG
25 CAPSULE ORAL ONCE
Status: COMPLETED | OUTPATIENT
Start: 2018-11-16 | End: 2018-11-16

## 2018-11-16 RX ORDER — SODIUM CHLORIDE 0.9 % (FLUSH) 0.9 %
10 SYRINGE (ML) INJECTION AS NEEDED
Status: CANCELLED | OUTPATIENT
Start: 2018-11-16

## 2018-11-16 RX ADMIN — FAMOTIDINE 20 MG: 10 INJECTION, SOLUTION INTRAVENOUS at 09:36

## 2018-11-16 RX ADMIN — SODIUM CHLORIDE, PRESERVATIVE FREE 500 UNITS: 5 INJECTION INTRAVENOUS at 12:55

## 2018-11-16 RX ADMIN — PACLITAXEL 120 MG: 6 INJECTION, SOLUTION INTRAVENOUS at 10:44

## 2018-11-16 RX ADMIN — GRANISETRON HYDROCHLORIDE 1 MG: 1 INJECTION INTRAVENOUS at 09:35

## 2018-11-16 RX ADMIN — DIPHENHYDRAMINE HYDROCHLORIDE 25 MG: 25 CAPSULE ORAL at 09:35

## 2018-11-16 RX ADMIN — TRASTUZUMAB 130 MG: 150 INJECTION, POWDER, LYOPHILIZED, FOR SOLUTION INTRAVENOUS at 10:02

## 2018-11-16 RX ADMIN — SODIUM CHLORIDE 250 ML: 900 INJECTION, SOLUTION INTRAVENOUS at 09:35

## 2018-11-16 RX ADMIN — DEXAMETHASONE SODIUM PHOSPHATE 12 MG: 10 INJECTION, SOLUTION INTRAMUSCULAR; INTRAVENOUS at 09:35

## 2018-11-22 PROCEDURE — S0354 CANCER TREATMENT PLAN CHANGE: HCPCS | Performed by: INTERNAL MEDICINE

## 2018-11-30 ENCOUNTER — OFFICE VISIT (OUTPATIENT)
Dept: ONCOLOGY | Facility: CLINIC | Age: 63
End: 2018-11-30

## 2018-11-30 ENCOUNTER — INFUSION (OUTPATIENT)
Dept: ONCOLOGY | Facility: HOSPITAL | Age: 63
End: 2018-11-30

## 2018-11-30 VITALS
TEMPERATURE: 98 F | BODY MASS INDEX: 23.63 KG/M2 | HEART RATE: 93 BPM | OXYGEN SATURATION: 96 % | WEIGHT: 141.8 LBS | SYSTOLIC BLOOD PRESSURE: 127 MMHG | DIASTOLIC BLOOD PRESSURE: 79 MMHG | RESPIRATION RATE: 18 BRPM | HEIGHT: 65 IN

## 2018-11-30 VITALS — SYSTOLIC BLOOD PRESSURE: 149 MMHG | HEART RATE: 79 BPM | DIASTOLIC BLOOD PRESSURE: 90 MMHG

## 2018-11-30 DIAGNOSIS — Z45.2 FITTING AND ADJUSTMENT OF VASCULAR CATHETER: Primary | ICD-10-CM

## 2018-11-30 DIAGNOSIS — Z17.1 MALIGNANT NEOPLASM OF OVERLAPPING SITES OF RIGHT BREAST IN FEMALE, ESTROGEN RECEPTOR NEGATIVE (HCC): Primary | ICD-10-CM

## 2018-11-30 DIAGNOSIS — Z17.1 MALIGNANT NEOPLASM OF OVERLAPPING SITES OF RIGHT BREAST IN FEMALE, ESTROGEN RECEPTOR NEGATIVE (HCC): ICD-10-CM

## 2018-11-30 DIAGNOSIS — C50.811 MALIGNANT NEOPLASM OF OVERLAPPING SITES OF RIGHT BREAST IN FEMALE, ESTROGEN RECEPTOR NEGATIVE (HCC): Primary | ICD-10-CM

## 2018-11-30 DIAGNOSIS — C50.811 MALIGNANT NEOPLASM OF OVERLAPPING SITES OF RIGHT BREAST IN FEMALE, ESTROGEN RECEPTOR NEGATIVE (HCC): ICD-10-CM

## 2018-11-30 DIAGNOSIS — Z45.2 FITTING AND ADJUSTMENT OF VASCULAR CATHETER: ICD-10-CM

## 2018-11-30 PROBLEM — Z79.899 ENCOUNTER FOR LONG-TERM (CURRENT) USE OF HIGH-RISK MEDICATION: Status: ACTIVE | Noted: 2018-09-20

## 2018-11-30 LAB
ALBUMIN SERPL-MCNC: 4.4 G/DL (ref 3.5–5.2)
ALBUMIN/GLOB SERPL: 1.5 G/DL (ref 1.1–2.4)
ALP SERPL-CCNC: 99 U/L (ref 38–116)
ALT SERPL W P-5'-P-CCNC: 28 U/L (ref 0–33)
ANION GAP SERPL CALCULATED.3IONS-SCNC: 14.2 MMOL/L
AST SERPL-CCNC: 24 U/L (ref 0–32)
BASOPHILS # BLD AUTO: 0.07 10*3/MM3 (ref 0–0.1)
BASOPHILS NFR BLD AUTO: 0.9 % (ref 0–1.1)
BILIRUB SERPL-MCNC: 0.2 MG/DL (ref 0.1–1.2)
BUN BLD-MCNC: 20 MG/DL (ref 6–20)
BUN/CREAT SERPL: 26.3 (ref 7.3–30)
CALCIUM SPEC-SCNC: 9.8 MG/DL (ref 8.5–10.2)
CHLORIDE SERPL-SCNC: 99 MMOL/L (ref 98–107)
CO2 SERPL-SCNC: 26.8 MMOL/L (ref 22–29)
CREAT BLD-MCNC: 0.76 MG/DL (ref 0.6–1.1)
DEPRECATED RDW RBC AUTO: 64.1 FL (ref 37–49)
EOSINOPHIL # BLD AUTO: 0.42 10*3/MM3 (ref 0–0.36)
EOSINOPHIL NFR BLD AUTO: 5.6 % (ref 1–5)
ERYTHROCYTE [DISTWIDTH] IN BLOOD BY AUTOMATED COUNT: 22 % (ref 11.7–14.5)
GFR SERPL CREATININE-BSD FRML MDRD: 77 ML/MIN/1.73
GFR SERPL CREATININE-BSD FRML MDRD: 93 ML/MIN/1.73
GLOBULIN UR ELPH-MCNC: 2.9 GM/DL (ref 1.8–3.5)
GLUCOSE BLD-MCNC: 135 MG/DL (ref 74–124)
HCT VFR BLD AUTO: 33.7 % (ref 34–45)
HGB BLD-MCNC: 10.5 G/DL (ref 11.5–14.9)
IMM GRANULOCYTES # BLD: 0.04 10*3/MM3 (ref 0–0.03)
IMM GRANULOCYTES NFR BLD: 0.5 % (ref 0–0.5)
LYMPHOCYTES # BLD AUTO: 1.52 10*3/MM3 (ref 1–3.5)
LYMPHOCYTES NFR BLD AUTO: 20.3 % (ref 20–49)
MCH RBC QN AUTO: 25.5 PG (ref 27–33)
MCHC RBC AUTO-ENTMCNC: 31.2 G/DL (ref 32–35)
MCV RBC AUTO: 82 FL (ref 83–97)
MONOCYTES # BLD AUTO: 0.83 10*3/MM3 (ref 0.25–0.8)
MONOCYTES NFR BLD AUTO: 11.1 % (ref 4–12)
NEUTROPHILS # BLD AUTO: 4.6 10*3/MM3 (ref 1.5–7)
NEUTROPHILS NFR BLD AUTO: 61.6 % (ref 39–75)
NRBC BLD MANUAL-RTO: 0 /100 WBC (ref 0–0)
PLATELET # BLD AUTO: 400 10*3/MM3 (ref 150–375)
PMV BLD AUTO: 8.5 FL (ref 8.9–12.1)
POTASSIUM BLD-SCNC: 3.6 MMOL/L (ref 3.5–4.7)
PROT SERPL-MCNC: 7.3 G/DL (ref 6.3–8)
RBC # BLD AUTO: 4.11 10*6/MM3 (ref 3.9–5)
SODIUM BLD-SCNC: 140 MMOL/L (ref 134–145)
WBC NRBC COR # BLD: 7.48 10*3/MM3 (ref 4–10)

## 2018-11-30 PROCEDURE — 25010000002 TRASTUZUMAB PER 10 MG: Performed by: INTERNAL MEDICINE

## 2018-11-30 PROCEDURE — 63710000001 DIPHENHYDRAMINE PER 50 MG: Performed by: INTERNAL MEDICINE

## 2018-11-30 PROCEDURE — 85025 COMPLETE CBC W/AUTO DIFF WBC: CPT

## 2018-11-30 PROCEDURE — 25010000002 GRANISETRON PER 100 MCG: Performed by: INTERNAL MEDICINE

## 2018-11-30 PROCEDURE — 25010000002 PACLITAXEL PER 30 MG: Performed by: INTERNAL MEDICINE

## 2018-11-30 PROCEDURE — 80053 COMPREHEN METABOLIC PANEL: CPT

## 2018-11-30 PROCEDURE — 99214 OFFICE O/P EST MOD 30 MIN: CPT | Performed by: INTERNAL MEDICINE

## 2018-11-30 PROCEDURE — 96413 CHEMO IV INFUSION 1 HR: CPT | Performed by: INTERNAL MEDICINE

## 2018-11-30 PROCEDURE — 96375 TX/PRO/DX INJ NEW DRUG ADDON: CPT | Performed by: INTERNAL MEDICINE

## 2018-11-30 PROCEDURE — 25010000003 DEXAMETHASONE SODIUM PHOSPHATE 100 MG/10ML SOLUTION: Performed by: INTERNAL MEDICINE

## 2018-11-30 PROCEDURE — 96417 CHEMO IV INFUS EACH ADDL SEQ: CPT | Performed by: INTERNAL MEDICINE

## 2018-11-30 RX ORDER — GRANISETRON HYDROCHLORIDE 1 MG/ML
1 INJECTION INTRAVENOUS ONCE
Status: COMPLETED | OUTPATIENT
Start: 2018-11-30 | End: 2018-11-30

## 2018-11-30 RX ORDER — SODIUM CHLORIDE 9 MG/ML
250 INJECTION, SOLUTION INTRAVENOUS ONCE
Status: COMPLETED | OUTPATIENT
Start: 2018-11-30 | End: 2018-11-30

## 2018-11-30 RX ORDER — DIPHENHYDRAMINE HCL 25 MG
25 CAPSULE ORAL ONCE
Status: COMPLETED | OUTPATIENT
Start: 2018-11-30 | End: 2018-11-30

## 2018-11-30 RX ORDER — DIPHENHYDRAMINE HCL 25 MG
25 CAPSULE ORAL ONCE
Status: CANCELLED | OUTPATIENT
Start: 2018-11-30

## 2018-11-30 RX ADMIN — SODIUM CHLORIDE 250 ML: 9 INJECTION, SOLUTION INTRAVENOUS at 09:43

## 2018-11-30 RX ADMIN — DEXAMETHASONE SODIUM PHOSPHATE 12 MG: 10 INJECTION, SOLUTION INTRAMUSCULAR; INTRAVENOUS at 09:48

## 2018-11-30 RX ADMIN — DIPHENHYDRAMINE HYDROCHLORIDE 25 MG: 25 CAPSULE ORAL at 09:43

## 2018-11-30 RX ADMIN — FAMOTIDINE 20 MG: 10 INJECTION, SOLUTION INTRAVENOUS at 09:43

## 2018-11-30 RX ADMIN — GRANISETRON HYDROCHLORIDE 1 MG: 1 INJECTION INTRAVENOUS at 09:43

## 2018-11-30 RX ADMIN — PACLITAXEL 120 MG: 6 INJECTION, SOLUTION INTRAVENOUS at 10:50

## 2018-11-30 RX ADMIN — TRASTUZUMAB 130 MG: 150 INJECTION, POWDER, LYOPHILIZED, FOR SOLUTION INTRAVENOUS at 10:14

## 2018-11-30 NOTE — PROGRESS NOTES
Subjective     REASON FOR CONSULTATION:    1.Multifocal right breast cancer post bilateral mastectomies- T1b N0+-10:00 tumor 8 mm grade 1 ER/AR positive HER-2 negative Oncotype pending    2.  5:00 tumor right breast ER/AR negative HER-2 positive  3.  Negative genetic testing  4.  Adequate echo weekly Taxol Herceptin started on 9/21/18                          Referring physician Kwaku Gudino M.D.      History of Present Illness : This is a 62-year-old lady with a multifocal right breast cancer T1b N0+ ER/AR negative HER-2 positive and the second T1b ER/AR positive HER-2 negative tumor here for 10th  dose of Taxol Herceptin on a weekly schedule.      She has developed mild neuropathy in her fingertips and toes neuropathy in her hair is thinning slightly despite the [employer] system but she is happy that more  is not dropping out    Repeat Oncotype on her second tumor is 4 and she is reassured that this is low risk    Right implant was resutured for the third time and there is no signs of infection but some of the fluid had to be taken out of her implant to prevent it from being so tightly stretched    She has some shortness of breath on climbing stairs but otherwise is asymptomatic and I don't think this is cardiac dysfunction which is fatigue from  chemotherapy repeat echo is due before starting maintenance Herceptin    Past Medical History:   Diagnosis Date   • Environmental allergies    • GERD (gastroesophageal reflux disease)    • History of foreign travel     [place]   • Hypertension    • Malignant neoplasm of overlapping sites of right breast in female, estrogen receptor positive (CMS/HCC) 7/18/2018        Past Surgical History:   Procedure Laterality Date   • BREAST BIOPSY Right 2018    Malignant   • HYSTERECTOMY  1989   • OOPHORECTOMY Bilateral 1989      ONCOLOGIC HISTORY;  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  The patient had never had a previous mammogram and was referred for evaluation at which time she was found to have a 4.2 cm mass in the upper outer quadrant of the right breast at the 9:30 position associated with pleomorphic calcifications in the lower inner quadrant also felt to be suspicious left breast was benign.  Patient underwent ultrasound guided biopsy on 6/15/18 with the biopsy at the 9:30 position with clip placement showing invasive mammary carcinoma ductal type low-grade ER % UT 9800% HER-2 2+ negative by fish.  Patient also underwent biopsy at the 4:00 position which showed a microscopic focus of invasive ductal carcinoma felt to be probably a lymphatic space involvement and was too small to do additional testing and a clip was placed.  Patient went on to have bilateral breast MRIs and was referred to Dr. Gudino MRIs confirmed 2 areas of abnormality at the breast-lower inner quadrant showed a large hematoma with a clip identified and there is extensive clumped abnormal enhancement beginning superior to the biopsy cavity and extending laterally to the entire upper half of the right breast into the upper outer quadrant measuring 1.2 x 1.4 cm and extending over 7 cm.  A biopsy clip was identified in this area the abnormal enhancement focally abutted the pectoralis muscle medially with no invasion through few borderline enlarged right axillary nodes with normal morphology no internal mammary adenopathy was noted left breast was benign.  Patient went on to have bilateral mastectomies the final path report showed 2 separate primaries in the breast associated with extensive high-grade DCIS measuring 6 cm  The lower inner quadrant lesion measured 1 cm was a grade 3 invasive ductal carcinoma ER 0 UT 0 HER-2 3+ and the lesion at the 9:30 position was a grade 1 invasive ductal carcinoma measuring 8 mm strongly ER/UT positive and HER-2 negative based on preop testing.  3 sentinel  nodes were obtained 1 showed isolated tumor cells.  Margins showed  Positivity with DCIS in the inferior medial margin and was close to the deep margin (2mm) and inferior superficial margin(3mm).  Lymphovascular invasion was present.  Left breast is benign    Postoperatively she's done well and had her expanders filled and is here today to discuss adjuvant treatment.  Unfortunately she did not know about the testing on the second tumor at surgery and thought she had a grade 1 ER/OH positive HER-2 negative tumor and was very surprised when I told her the prior markers on the second tumor.    She is  0 para 0 menarche was at age 13 menopause at age 33 when she had a hysterectomy and oophorectomy.  She's been on hormone replacement since age of 33 until her recent diagnosis .    Family history is positive for mother having colon cancer age 72 maternal aunt with breast cancer in her 60s and another maternal aunt with uterine cancer in her 70s.  She's had negative genetic testing but the extended panel is being done and is pending    We discussed treatment plans and I have scheduled her for Chemotherapy education with tentative plans for weekly Taxol Herceptin for 12 weeks followed by Herceptin for a year  We will do an Oncotype DX score on the hormone positive tumor which is low-grade unlikely to be high risk    We discussed whether or not radiation would be indicated and except for the fact that there is a positive margin with high-grade DCIS normally she would not need radiation but I recommended a consultation because of the margins and the high grade nature of the DCIS.    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  She is presented at the multidiscipline conference and everybody felt comfortable with weekly Taxol Herceptin for 12 weeks followed by Herceptin for a year and Oncotype testing on the second tumor for which she would receive an aromatase inhibitor for 5 years unless it was high risk which is very unlikely    She is here today having had an echo which showed an adequate ejection fraction  Of 61% and she had a port placed and is here to start treatment.    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    Unfortunately the wrong specimen was sent for Oncotype testing and ER negative specimen was sent and I talked to the pathologist and they will send the correct specimen for Oncotype DX testing with no talked to the patient.    That he has no questions and is happy to finally get started with therapy.    Genetic testing results are negative using the INVITAE  panel  -      Current Outpatient Medications on File Prior to Visit   Medication Sig Dispense Refill   • chlorthalidone (HYGROTON) 25 MG tablet Take 25 mg by mouth Every Night.     • eszopiclone (LUNESTA) 3 MG tablet Take 1 tablet by mouth every night at bedtime. 30 tablet 0   • fexofenadine-pseudoephedrine (ALLEGRA-D 24) 180-240 MG per 24 hr tablet Take 1 tablet by mouth Daily.     • omeprazole-sodium bicarbonate (ZEGERID)  MG per capsule Take 1 capsule by mouth Every  Night.     • ondansetron (ZOFRAN) 8 MG tablet Take 1 tablet by mouth Every 8 (Eight) Hours As Needed for Nausea or Vomiting. 30 tablet 2   • raNITIdine (ZANTAC) 150 MG tablet Take 150 mg by mouth 2 (Two) Times a Day.     • Turmeric 500 MG tablet Take 1,000 mg by mouth Daily.     • Ciprofloxacin (CIPRO PO) Take 1,000 mg by mouth Daily. For 5 days     • HYDROcodone-acetaminophen (NORCO) 5-325 MG per tablet Take 1-2 tablets by mouth Every 4 (Four) Hours As Needed (Pain). 8 tablet 0     Current Facility-Administered Medications on File Prior to Visit   Medication Dose Route Frequency Provider Last Rate Last Dose   • [COMPLETED] dexamethasone (DECADRON) IVPB 12 mg  12 mg Intravenous Once Cecy Treviño MD   Stopped at 11/30/18 1003   • [COMPLETED] diphenhydrAMINE (BENADRYL) capsule 25 mg  25 mg Oral Once Cecy Treviño MD   25 mg at 11/30/18 0943   • [COMPLETED] famotidine (PEPCID) injection 20 mg  20 mg Intravenous Once Cecy Treviño MD   20 mg at 11/30/18 0943   • [COMPLETED] granisetron (KYTRIL) injection 1 mg  1 mg Intravenous Once Cecy Treviño MD   1 mg at 11/30/18 0943   • [COMPLETED] PACLitaxel (TAXOL) 120 mg in sodium chloride 0.9 % 270 mL chemo IVPB  70 mg/m2 (Treatment Plan Recorded) Intravenous Once Cecy Treviño MD   Stopped at 11/30/18 1156   • [COMPLETED] sodium chloride 0.9 % infusion 250 mL  250 mL Intravenous Once Cecy Treviño MD 20 mL/hr at 11/30/18 0943 250 mL at 11/30/18 0943   • [COMPLETED] Trastuzumab (HERCEPTIN) 130 mg in sodium chloride 0.9 % 256.2 mL chemo IVPB  2 mg/kg (Treatment Plan Recorded) Intravenous Once Cecy Treviño MD   Stopped at 11/30/18 1049        ALLERGIES:    Allergies   Allergen Reactions   • Moxifloxacin Anaphylaxis and Rash   • Aloxi [Palonosetron Hcl] Itching   • Latex Rash     Added based on information entered during log entry, please review and add reactions, type, and severity as needed        Social History     Socioeconomic History   • Marital status:  "     Spouse name: FLORINA Gross   • Number of children: 0   • Years of education: Graduate School   • Highest education level: Not on file   Occupational History   • Occupation: Physician     Employer: [employer]   Tobacco Use   • Smoking status: Never Smoker   • Smokeless tobacco: Never Used   Substance and Sexual Activity   • Alcohol use: Yes     Types: 1 Glasses of wine per week   • Drug use: No   • Sexual activity: Defer     Comment:         Family History   Problem Relation Age of Onset   • Colon cancer Mother 72   • Breast cancer Maternal Aunt 60   • Uterine cancer Maternal Aunt 75   • Stroke Father    • Malig Hyperthermia Neg Hx         Review of Systems   Constitutional: Positive for diaphoresis.   Gastrointestinal:        Chronic indigestion   Endocrine: Positive for heat intolerance.   Neurological: 1-rd3rdgrdrrdarddrderd numbness.        Objective     Vitals:    11/30/18 0836   BP: 127/79   Pulse: 93   Resp: 18   Temp: 98 °F (36.7 °C)   SpO2: 96%   Weight: 64.3 kg (141 lb 12.8 oz)   Height: 165 cm (64.96\")   PainSc: 0-No pain     Current Status 11/30/2018   ECOG score 0       Physical Exam    GENERAL:  Well-developed, well-nourished in no acute distress.   SKIN:  Warm, dry without rashes, purpura or petechiae.  EYES:  Pupils equal, round and reactive to light.  EOMs intact.  Conjunctivae normal.  EARS:  Hearing intact.  NOSE:  Septum midline.  No excoriations or nasal discharge.  MOUTH:  Tongue is well-papillated; no stomatitis or ulcers.  Lips normal.  THROAT:  Oropharynx without lesions or exudates.  NECK:  Supple with good range of motion; no thyromegaly or masses, no JVD.  LYMPHATICS:  No cervical, supraclavicular, axillary or inguinal adenopathy.  CHEST:  Lungs clear to auscultation. Good airflow.  BREASTS: biLateral mastectomies with expanders in place-the middle of her incision on the right breast shows resutures with no active infection   CARDIAC:  Regular rate and rhythm " without murmurs, rubs or gallops. Normal S1,S2.  ABDOMEN:  Soft, nontender with no hepatosplenomegaly or masses.  EXTREMITIES:  No clubbing, cyanosis or edema.  NEUROLOGICAL:  Cranial Nerves II-XII grossly intact.  No focal neurological deficits.  PSYCHIATRIC:  Normal affect and mood.        RECENT LABS:  Hematology WBC   Date Value Ref Range Status   11/30/2018 7.48 4.00 - 10.00 10*3/mm3 Final     RBC   Date Value Ref Range Status   11/30/2018 4.11 3.90 - 5.00 10*6/mm3 Final     Hemoglobin   Date Value Ref Range Status   11/30/2018 10.5 (L) 11.5 - 14.9 g/dL Final     Hematocrit   Date Value Ref Range Status   11/30/2018 33.7 (L) 34.0 - 45.0 % Final     Platelets   Date Value Ref Range Status   11/30/2018 400 (H) 150 - 375 10*3/mm3 Final        SYNOPTIC REPORT (Based on [facility] Cancer Case Summary, version January 2018):                Procedure: Total mastectomy.               Specimen/tumor laterality: Right.                Tumor site: Lower inner quadrant and central/inferior breast.                 Tumor size (greastest dimension of largest invasive focus): 1 cm (lower inner                       quadrant tumor).               Histologic type: Invasive carcinoma of no special type (ductal, not otherwise specified).                 Histologic grade (Rebeca Histologic Score):                              Glandular (acinar)/tubular differentiation: Score 3.                             Nuclear pleomorphism: Score 3.                             Mitotic rate: Score 2.                            Overall grade: Grade 3 (score 8 of 9).                            NOTE: The above Rebeca Histologic Score applies to the largest and                                    highest grade tumor (lower inner quadrant tumor).  The tumor in the                                    central/inferior breast is intermediate grade (tubular score 3, nuclear                                    score 2, mitotic score 1).               Tumor  focality: Multiple foci of invasive carcinoma.                             Number of foci: 2.                              Sizes of individual foci: 1 cm (lower inner quadrant tumor) and approximately                                    0.8 cm (central/inferior tumor).               Ductal carcinoma in situ (DCIS): Present, positive for extensive intraductal component.                            Size (extent) of DCIS: Approximately greater than 6 cm (estimated based                                    on gross and microscopic findings).                            Architectural pattern: Solid and cribriform.                            Nuclear grade: Grade III (high).                            Necrosis: Central comedonecrosis.               Lobular carcinoma in situ (LCIS): No LCIS in specimen.               Margins:                             Invasive carcinoma margins: Uninvolved by invasive carcinoma.                                          Distance from closest margin: 2 mm (deep margin).                             DCIS margins: Positive for DCIS (inferior medial margin [lower inner quadrant]).                                          Additional DCIS margins: DCIS extends to within 2 mm of deep margin                                                 and to within 3 mm of inferior superficial soft tissue margin.  Atypical                                                 ductal hyperplasia is focally present at the deep margin.               Regional lymph nodes: Involved by tumor cells.                             Number of lymph nodes with macrometastases: 0.                            Number of lymph nodes with micrometastases: 0.                            Number of lymph nodes with isolated tumor cells: 1.                            Number of lymph nodes examined: 3.                             Number of sentinel lymph nodes examined: 3.                Treatment effect: No known presurgical therapy.                 Lymphovascular invasion: Present.               Dermal lymphovascular invasion: Not identified.                Pathologic stage classification:                             TNM descriptors: m (multiple foci of invasive carcinoma).                             Primary tumor: pT1b.                            Regional lymph nodes: pN0(i+)(sn).                             Distant metastasis: Not applicable.                Additional pathologic findings:                              Ductal hyperplasia of the usual type.                              Fibroinflammatory changes consistent with sites of prior biopsy (2 prior biopsy                                    sites).                 Ancillary studies:ER MS negative HER-2 positive lower inner quadrant tumor       Assessment/Plan   1.  Multifocal right breast cancer T1 BN(ic)-isolated tumor cells medial tumor being grade 3 ER/MS negative HER-2 3+ lateral tumor being T1b N0 grade 1 ER/MS positive HER-2 2+ negative fish Oncotype score of 4  ·   Positive family history of multiple malignancies genetic testing negative  · Weekly Taxol Herceptin ×12 planned followed by aromatase inhibitor for her second tumor  .    2.  Wound dehiscence no signs of infection resutured    Plan  1.  Taxol Herceptin #10 today same dose  2 [employer] cooling system  3.  Taxol Herceptin weekly for 2 weeks  4.  Return in 4 weeks for maintenance Herceptin to start with an echo.  NP visit  5.  See me in 7 weeks for her second dose of maintenance Herceptin and we will start Arimidex for her second tumor at that time-DEXA scan will be needed if she has not already done this           History of Present Illness     Physical Exam

## 2018-12-06 RX ORDER — DIPHENHYDRAMINE HCL 25 MG
25 CAPSULE ORAL ONCE
Status: CANCELLED | OUTPATIENT
Start: 2019-01-04

## 2018-12-06 RX ORDER — DIPHENHYDRAMINE HCL 25 MG
25 CAPSULE ORAL ONCE
Status: CANCELLED | OUTPATIENT
Start: 2018-12-07

## 2018-12-07 ENCOUNTER — INFUSION (OUTPATIENT)
Dept: ONCOLOGY | Facility: HOSPITAL | Age: 63
End: 2018-12-07

## 2018-12-07 VITALS
SYSTOLIC BLOOD PRESSURE: 138 MMHG | DIASTOLIC BLOOD PRESSURE: 90 MMHG | BODY MASS INDEX: 23.68 KG/M2 | HEART RATE: 88 BPM | WEIGHT: 142.1 LBS

## 2018-12-07 DIAGNOSIS — Z79.899 ENCOUNTER FOR LONG-TERM (CURRENT) USE OF HIGH-RISK MEDICATION: ICD-10-CM

## 2018-12-07 DIAGNOSIS — Z17.1 MALIGNANT NEOPLASM OF OVERLAPPING SITES OF RIGHT BREAST IN FEMALE, ESTROGEN RECEPTOR NEGATIVE (HCC): ICD-10-CM

## 2018-12-07 DIAGNOSIS — Z45.2 FITTING AND ADJUSTMENT OF VASCULAR CATHETER: ICD-10-CM

## 2018-12-07 DIAGNOSIS — Z17.1 MALIGNANT NEOPLASM OF OVERLAPPING SITES OF RIGHT BREAST IN FEMALE, ESTROGEN RECEPTOR NEGATIVE (HCC): Primary | ICD-10-CM

## 2018-12-07 DIAGNOSIS — C50.811 MALIGNANT NEOPLASM OF OVERLAPPING SITES OF RIGHT BREAST IN FEMALE, ESTROGEN RECEPTOR NEGATIVE (HCC): ICD-10-CM

## 2018-12-07 DIAGNOSIS — C50.811 MALIGNANT NEOPLASM OF OVERLAPPING SITES OF RIGHT BREAST IN FEMALE, ESTROGEN RECEPTOR NEGATIVE (HCC): Primary | ICD-10-CM

## 2018-12-07 LAB
ALBUMIN SERPL-MCNC: 4.4 G/DL (ref 3.5–5.2)
ALBUMIN/GLOB SERPL: 1.6 G/DL (ref 1.1–2.4)
ALP SERPL-CCNC: 105 U/L (ref 38–116)
ALT SERPL W P-5'-P-CCNC: 37 U/L (ref 0–33)
ANION GAP SERPL CALCULATED.3IONS-SCNC: 13.3 MMOL/L
AST SERPL-CCNC: 33 U/L (ref 0–32)
BASOPHILS # BLD AUTO: 0.07 10*3/MM3 (ref 0–0.1)
BASOPHILS NFR BLD AUTO: 1.1 % (ref 0–1.1)
BILIRUB SERPL-MCNC: 0.2 MG/DL (ref 0.1–1.2)
BUN BLD-MCNC: 21 MG/DL (ref 6–20)
BUN/CREAT SERPL: 27.6 (ref 7.3–30)
CALCIUM SPEC-SCNC: 9.5 MG/DL (ref 8.5–10.2)
CHLORIDE SERPL-SCNC: 99 MMOL/L (ref 98–107)
CO2 SERPL-SCNC: 27.7 MMOL/L (ref 22–29)
CREAT BLD-MCNC: 0.76 MG/DL (ref 0.6–1.1)
DEPRECATED RDW RBC AUTO: 63.9 FL (ref 37–49)
EOSINOPHIL # BLD AUTO: 0.39 10*3/MM3 (ref 0–0.36)
EOSINOPHIL NFR BLD AUTO: 6.2 % (ref 1–5)
ERYTHROCYTE [DISTWIDTH] IN BLOOD BY AUTOMATED COUNT: 21.5 % (ref 11.7–14.5)
GFR SERPL CREATININE-BSD FRML MDRD: 77 ML/MIN/1.73
GFR SERPL CREATININE-BSD FRML MDRD: 93 ML/MIN/1.73
GLOBULIN UR ELPH-MCNC: 2.7 GM/DL (ref 1.8–3.5)
GLUCOSE BLD-MCNC: 138 MG/DL (ref 74–124)
HCT VFR BLD AUTO: 32.1 % (ref 34–45)
HGB BLD-MCNC: 10 G/DL (ref 11.5–14.9)
IMM GRANULOCYTES # BLD: 0.06 10*3/MM3 (ref 0–0.03)
IMM GRANULOCYTES NFR BLD: 1 % (ref 0–0.5)
LYMPHOCYTES # BLD AUTO: 1.52 10*3/MM3 (ref 1–3.5)
LYMPHOCYTES NFR BLD AUTO: 24.3 % (ref 20–49)
MCH RBC QN AUTO: 25.9 PG (ref 27–33)
MCHC RBC AUTO-ENTMCNC: 31.2 G/DL (ref 32–35)
MCV RBC AUTO: 83.2 FL (ref 83–97)
MONOCYTES # BLD AUTO: 0.5 10*3/MM3 (ref 0.25–0.8)
MONOCYTES NFR BLD AUTO: 8 % (ref 4–12)
NEUTROPHILS # BLD AUTO: 3.71 10*3/MM3 (ref 1.5–7)
NEUTROPHILS NFR BLD AUTO: 59.4 % (ref 39–75)
NRBC BLD MANUAL-RTO: 0 /100 WBC (ref 0–0)
PLATELET # BLD AUTO: 372 10*3/MM3 (ref 150–375)
PMV BLD AUTO: 8.4 FL (ref 8.9–12.1)
POTASSIUM BLD-SCNC: 3.8 MMOL/L (ref 3.5–4.7)
PROT SERPL-MCNC: 7.1 G/DL (ref 6.3–8)
RBC # BLD AUTO: 3.86 10*6/MM3 (ref 3.9–5)
SODIUM BLD-SCNC: 140 MMOL/L (ref 134–145)
WBC NRBC COR # BLD: 6.25 10*3/MM3 (ref 4–10)

## 2018-12-07 PROCEDURE — 80053 COMPREHEN METABOLIC PANEL: CPT

## 2018-12-07 PROCEDURE — 96413 CHEMO IV INFUSION 1 HR: CPT | Performed by: INTERNAL MEDICINE

## 2018-12-07 PROCEDURE — 25010000002 PACLITAXEL PER 30 MG: Performed by: INTERNAL MEDICINE

## 2018-12-07 PROCEDURE — 96417 CHEMO IV INFUS EACH ADDL SEQ: CPT | Performed by: INTERNAL MEDICINE

## 2018-12-07 PROCEDURE — 25010000003 DEXAMETHASONE SODIUM PHOSPHATE 100 MG/10ML SOLUTION: Performed by: INTERNAL MEDICINE

## 2018-12-07 PROCEDURE — 85025 COMPLETE CBC W/AUTO DIFF WBC: CPT

## 2018-12-07 PROCEDURE — 25010000002 TRASTUZUMAB PER 10 MG: Performed by: INTERNAL MEDICINE

## 2018-12-07 PROCEDURE — 63710000001 DIPHENHYDRAMINE PER 50 MG: Performed by: INTERNAL MEDICINE

## 2018-12-07 PROCEDURE — 96375 TX/PRO/DX INJ NEW DRUG ADDON: CPT | Performed by: INTERNAL MEDICINE

## 2018-12-07 PROCEDURE — 25010000002 GRANISETRON PER 100 MCG: Performed by: INTERNAL MEDICINE

## 2018-12-07 RX ORDER — SODIUM CHLORIDE 0.9 % (FLUSH) 0.9 %
10 SYRINGE (ML) INJECTION AS NEEDED
Status: DISCONTINUED | OUTPATIENT
Start: 2018-12-07 | End: 2018-12-07 | Stop reason: HOSPADM

## 2018-12-07 RX ORDER — DIPHENHYDRAMINE HCL 25 MG
25 CAPSULE ORAL ONCE
Status: COMPLETED | OUTPATIENT
Start: 2018-12-07 | End: 2018-12-07

## 2018-12-07 RX ORDER — GRANISETRON HYDROCHLORIDE 1 MG/ML
1 INJECTION INTRAVENOUS ONCE
Status: COMPLETED | OUTPATIENT
Start: 2018-12-07 | End: 2018-12-07

## 2018-12-07 RX ORDER — SODIUM CHLORIDE 9 MG/ML
250 INJECTION, SOLUTION INTRAVENOUS ONCE
Status: COMPLETED | OUTPATIENT
Start: 2018-12-07 | End: 2018-12-07

## 2018-12-07 RX ORDER — SODIUM CHLORIDE 0.9 % (FLUSH) 0.9 %
10 SYRINGE (ML) INJECTION AS NEEDED
Status: CANCELLED | OUTPATIENT
Start: 2018-12-07

## 2018-12-07 RX ADMIN — GRANISETRON HYDROCHLORIDE 1 MG: 1 INJECTION INTRAVENOUS at 09:26

## 2018-12-07 RX ADMIN — DEXAMETHASONE SODIUM PHOSPHATE 12 MG: 10 INJECTION, SOLUTION INTRAMUSCULAR; INTRAVENOUS at 09:26

## 2018-12-07 RX ADMIN — SODIUM CHLORIDE 250 ML: 9 INJECTION, SOLUTION INTRAVENOUS at 09:26

## 2018-12-07 RX ADMIN — SODIUM CHLORIDE, PRESERVATIVE FREE 500 UNITS: 5 INJECTION INTRAVENOUS at 12:31

## 2018-12-07 RX ADMIN — PACLITAXEL 120 MG: 6 INJECTION, SOLUTION INTRAVENOUS at 10:26

## 2018-12-07 RX ADMIN — FAMOTIDINE 20 MG: 10 INJECTION, SOLUTION INTRAVENOUS at 09:26

## 2018-12-07 RX ADMIN — DIPHENHYDRAMINE HYDROCHLORIDE 25 MG: 25 CAPSULE ORAL at 09:26

## 2018-12-07 RX ADMIN — TRASTUZUMAB 130 MG: 150 INJECTION, POWDER, LYOPHILIZED, FOR SOLUTION INTRAVENOUS at 09:52

## 2018-12-14 ENCOUNTER — OFFICE VISIT (OUTPATIENT)
Dept: ONCOLOGY | Facility: CLINIC | Age: 63
End: 2018-12-14

## 2018-12-14 ENCOUNTER — INFUSION (OUTPATIENT)
Dept: ONCOLOGY | Facility: HOSPITAL | Age: 63
End: 2018-12-14

## 2018-12-14 VITALS
WEIGHT: 140.5 LBS | HEART RATE: 94 BPM | TEMPERATURE: 97.6 F | RESPIRATION RATE: 14 BRPM | OXYGEN SATURATION: 96 % | BODY MASS INDEX: 23.41 KG/M2 | HEIGHT: 65 IN

## 2018-12-14 VITALS — DIASTOLIC BLOOD PRESSURE: 77 MMHG | HEART RATE: 85 BPM | SYSTOLIC BLOOD PRESSURE: 120 MMHG

## 2018-12-14 DIAGNOSIS — C50.811 MALIGNANT NEOPLASM OF OVERLAPPING SITES OF RIGHT BREAST IN FEMALE, ESTROGEN RECEPTOR NEGATIVE (HCC): ICD-10-CM

## 2018-12-14 DIAGNOSIS — Z17.1 MALIGNANT NEOPLASM OF OVERLAPPING SITES OF RIGHT BREAST IN FEMALE, ESTROGEN RECEPTOR NEGATIVE (HCC): ICD-10-CM

## 2018-12-14 DIAGNOSIS — C50.811 MALIGNANT NEOPLASM OF OVERLAPPING SITES OF RIGHT FEMALE BREAST, UNSPECIFIED ESTROGEN RECEPTOR STATUS (HCC): Primary | ICD-10-CM

## 2018-12-14 DIAGNOSIS — Z79.899 ENCOUNTER FOR LONG-TERM (CURRENT) USE OF HIGH-RISK MEDICATION: ICD-10-CM

## 2018-12-14 DIAGNOSIS — Z17.1 MALIGNANT NEOPLASM OF OVERLAPPING SITES OF RIGHT BREAST IN FEMALE, ESTROGEN RECEPTOR NEGATIVE (HCC): Primary | ICD-10-CM

## 2018-12-14 DIAGNOSIS — C50.811 MALIGNANT NEOPLASM OF OVERLAPPING SITES OF RIGHT BREAST IN FEMALE, ESTROGEN RECEPTOR NEGATIVE (HCC): Primary | ICD-10-CM

## 2018-12-14 LAB
ALBUMIN SERPL-MCNC: 4.3 G/DL (ref 3.5–5.2)
ALBUMIN/GLOB SERPL: 1.6 G/DL (ref 1.1–2.4)
ALP SERPL-CCNC: 93 U/L (ref 38–116)
ALT SERPL W P-5'-P-CCNC: 37 U/L (ref 0–33)
ANION GAP SERPL CALCULATED.3IONS-SCNC: 14.3 MMOL/L
AST SERPL-CCNC: 24 U/L (ref 0–32)
BASOPHILS # BLD AUTO: 0.05 10*3/MM3 (ref 0–0.1)
BASOPHILS NFR BLD AUTO: 1 % (ref 0–1.1)
BILIRUB SERPL-MCNC: 0.2 MG/DL (ref 0.1–1.2)
BUN BLD-MCNC: 18 MG/DL (ref 6–20)
BUN/CREAT SERPL: 25 (ref 7.3–30)
CALCIUM SPEC-SCNC: 9.5 MG/DL (ref 8.5–10.2)
CHLORIDE SERPL-SCNC: 99 MMOL/L (ref 98–107)
CO2 SERPL-SCNC: 26.7 MMOL/L (ref 22–29)
CREAT BLD-MCNC: 0.72 MG/DL (ref 0.6–1.1)
DEPRECATED RDW RBC AUTO: 64.3 FL (ref 37–49)
EOSINOPHIL # BLD AUTO: 0.22 10*3/MM3 (ref 0–0.36)
EOSINOPHIL NFR BLD AUTO: 4.5 % (ref 1–5)
ERYTHROCYTE [DISTWIDTH] IN BLOOD BY AUTOMATED COUNT: 21.6 % (ref 11.7–14.5)
GFR SERPL CREATININE-BSD FRML MDRD: 82 ML/MIN/1.73
GFR SERPL CREATININE-BSD FRML MDRD: 99 ML/MIN/1.73
GLOBULIN UR ELPH-MCNC: 2.7 GM/DL (ref 1.8–3.5)
GLUCOSE BLD-MCNC: 140 MG/DL (ref 74–124)
HCT VFR BLD AUTO: 31.8 % (ref 34–45)
HGB BLD-MCNC: 9.9 G/DL (ref 11.5–14.9)
IMM GRANULOCYTES # BLD: 0.04 10*3/MM3 (ref 0–0.03)
IMM GRANULOCYTES NFR BLD: 0.8 % (ref 0–0.5)
LYMPHOCYTES # BLD AUTO: 1.34 10*3/MM3 (ref 1–3.5)
LYMPHOCYTES NFR BLD AUTO: 27.6 % (ref 20–49)
MCH RBC QN AUTO: 26.1 PG (ref 27–33)
MCHC RBC AUTO-ENTMCNC: 31.1 G/DL (ref 32–35)
MCV RBC AUTO: 83.9 FL (ref 83–97)
MONOCYTES # BLD AUTO: 0.41 10*3/MM3 (ref 0.25–0.8)
MONOCYTES NFR BLD AUTO: 8.4 % (ref 4–12)
NEUTROPHILS # BLD AUTO: 2.8 10*3/MM3 (ref 1.5–7)
NEUTROPHILS NFR BLD AUTO: 57.7 % (ref 39–75)
NRBC BLD MANUAL-RTO: 0 /100 WBC (ref 0–0)
PLATELET # BLD AUTO: 339 10*3/MM3 (ref 150–375)
PMV BLD AUTO: 8.7 FL (ref 8.9–12.1)
POTASSIUM BLD-SCNC: 3.5 MMOL/L (ref 3.5–4.7)
PROT SERPL-MCNC: 7 G/DL (ref 6.3–8)
RBC # BLD AUTO: 3.79 10*6/MM3 (ref 3.9–5)
SODIUM BLD-SCNC: 140 MMOL/L (ref 134–145)
WBC NRBC COR # BLD: 4.86 10*3/MM3 (ref 4–10)

## 2018-12-14 PROCEDURE — 85025 COMPLETE CBC W/AUTO DIFF WBC: CPT

## 2018-12-14 PROCEDURE — 96417 CHEMO IV INFUS EACH ADDL SEQ: CPT | Performed by: NURSE PRACTITIONER

## 2018-12-14 PROCEDURE — 25010000002 PACLITAXEL PER 30 MG: Performed by: INTERNAL MEDICINE

## 2018-12-14 PROCEDURE — 63710000001 DIPHENHYDRAMINE PER 50 MG: Performed by: INTERNAL MEDICINE

## 2018-12-14 PROCEDURE — 96375 TX/PRO/DX INJ NEW DRUG ADDON: CPT | Performed by: NURSE PRACTITIONER

## 2018-12-14 PROCEDURE — 80053 COMPREHEN METABOLIC PANEL: CPT

## 2018-12-14 PROCEDURE — 99214 OFFICE O/P EST MOD 30 MIN: CPT | Performed by: NURSE PRACTITIONER

## 2018-12-14 PROCEDURE — 25010000002 GRANISETRON PER 100 MCG: Performed by: INTERNAL MEDICINE

## 2018-12-14 PROCEDURE — 96413 CHEMO IV INFUSION 1 HR: CPT | Performed by: NURSE PRACTITIONER

## 2018-12-14 PROCEDURE — 25010000003 DEXAMETHASONE SODIUM PHOSPHATE 100 MG/10ML SOLUTION: Performed by: INTERNAL MEDICINE

## 2018-12-14 PROCEDURE — 25010000002 TRASTUZUMAB PER 10 MG: Performed by: INTERNAL MEDICINE

## 2018-12-14 RX ORDER — SODIUM CHLORIDE 9 MG/ML
250 INJECTION, SOLUTION INTRAVENOUS ONCE
Status: COMPLETED | OUTPATIENT
Start: 2018-12-14 | End: 2018-12-14

## 2018-12-14 RX ORDER — GRANISETRON HYDROCHLORIDE 1 MG/ML
1 INJECTION INTRAVENOUS ONCE
Status: COMPLETED | OUTPATIENT
Start: 2018-12-14 | End: 2018-12-14

## 2018-12-14 RX ORDER — DIPHENHYDRAMINE HCL 25 MG
25 CAPSULE ORAL ONCE
Status: COMPLETED | OUTPATIENT
Start: 2018-12-14 | End: 2018-12-14

## 2018-12-14 RX ORDER — SODIUM CHLORIDE 0.9 % (FLUSH) 0.9 %
10 SYRINGE (ML) INJECTION AS NEEDED
Status: CANCELLED | OUTPATIENT
Start: 2018-12-14

## 2018-12-14 RX ADMIN — DEXAMETHASONE SODIUM PHOSPHATE 12 MG: 10 INJECTION, SOLUTION INTRAMUSCULAR; INTRAVENOUS at 09:46

## 2018-12-14 RX ADMIN — FAMOTIDINE 20 MG: 10 INJECTION, SOLUTION INTRAVENOUS at 09:46

## 2018-12-14 RX ADMIN — SODIUM CHLORIDE, PRESERVATIVE FREE 500 UNITS: 5 INJECTION INTRAVENOUS at 12:56

## 2018-12-14 RX ADMIN — SODIUM CHLORIDE 250 ML: 9 INJECTION, SOLUTION INTRAVENOUS at 09:46

## 2018-12-14 RX ADMIN — GRANISETRON HYDROCHLORIDE 1 MG: 1 INJECTION INTRAVENOUS at 09:46

## 2018-12-14 RX ADMIN — TRASTUZUMAB 130 MG: 150 INJECTION, POWDER, LYOPHILIZED, FOR SOLUTION INTRAVENOUS at 10:11

## 2018-12-14 RX ADMIN — PACLITAXEL 120 MG: 6 INJECTION, SOLUTION INTRAVENOUS at 10:51

## 2018-12-14 RX ADMIN — DIPHENHYDRAMINE HYDROCHLORIDE 25 MG: 25 CAPSULE ORAL at 09:46

## 2018-12-14 NOTE — PROGRESS NOTES
Subjective     REASON FOR CONSULTATION:    1.Multifocal right breast cancer post bilateral mastectomies- T1b N0+-10:00 tumor 8 mm grade 1 ER/KS positive HER-2 negative Oncotype pending    2.  5:00 tumor right breast ER/KS negative HER-2 positive  3.  Negative genetic testing  4.  Adequate echo weekly Taxol Herceptin started on 9/21/18                          Referring physician Kwaku Gudino M.D.      History of Present Illness : This is a 62-year-old lady with a multifocal right breast cancer T1b N0+ ER/KS negative HER-2 positive and the second T1b ER/KS positive HER-2 negative tumor here for 12th  dose of Taxol Herceptin.      She continues to tolerate treatment quite well citing fatigue as her primary side effect. She continues to take Metanx for neuropathy and this has worked very well for her so day. Neuorpathy of the hand is intermittent and minimal.    Her hair is thinning despite the Paxman system.    She reports mild blurry vision that started approximately one week ago. She denies headaches, speech disturbances, photophobia, new neuropathy. She has a history of cataracts and has an appointment with opthamology next week.    Her appetite is good. Her bowels and bladder are working well.     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  The patient had never had a previous mammogram and was referred for evaluation at which time she was  found to have a 4.2 cm mass in the upper outer quadrant of the right breast at the 9:30 position associated with pleomorphic calcifications in the lower inner quadrant also felt to be suspicious left breast was benign.  Patient underwent ultrasound guided biopsy on 6/15/18 with the biopsy at the 9:30 position with clip placement showing invasive mammary carcinoma ductal type low-grade ER % SD 9800% HER-2 2+ negative by fish.  Patient also underwent biopsy at the 4:00 position which showed a microscopic focus of invasive ductal carcinoma felt to be probably a lymphatic space involvement and was too small to do additional testing and a clip was placed.  Patient went on to have bilateral breast MRIs and was referred to Dr. Gudino MRIs confirmed 2 areas of abnormality at the breast-lower inner quadrant showed a large hematoma with a clip identified and there is extensive clumped abnormal enhancement beginning superior to the biopsy cavity and extending laterally to the entire upper half of the right breast into the upper outer quadrant measuring 1.2 x 1.4 cm and extending over 7 cm.  A biopsy clip was identified in this area the abnormal enhancement focally abutted the pectoralis muscle medially with no invasion through few borderline enlarged right axillary nodes with normal morphology no internal mammary adenopathy was noted left breast was benign.  Patient went on to have bilateral mastectomies the final path report showed 2 separate primaries in the breast associated with extensive high-grade DCIS measuring 6 cm  The lower inner quadrant lesion measured 1 cm was a grade 3 invasive ductal carcinoma ER 0 SD 0 HER-2 3+ and the lesion at the 9:30 position was a grade 1 invasive ductal carcinoma measuring 8 mm strongly ER/SD positive and HER-2 negative based on preop testing.  3 sentinel nodes were obtained 1 showed isolated tumor cells.  Margins showed  Positivity with DCIS in the inferior medial margin and was  close to the deep margin (2mm) and inferior superficial margin(3mm).  Lymphovascular invasion was present.  Left breast is benign    Postoperatively she's done well and had her expanders filled and is here today to discuss adjuvant treatment.  Unfortunately she did not know about the testing on the second tumor at surgery and thought she had a grade 1 ER/FL positive HER-2 negative tumor and was very surprised when I told her the prior markers on the second tumor.    She is  0 para 0 menarche was at age 13 menopause at age 33 when she had a hysterectomy and oophorectomy.  She's been on hormone replacement since age of 33 until her recent diagnosis .    Family history is positive for mother having colon cancer age 72 maternal aunt with breast cancer in her 60s and another maternal aunt with uterine cancer in her 70s.  She's had negative genetic testing but the extended panel is being done and is pending    We discussed treatment plans and I have scheduled her for Chemotherapy education with tentative plans for weekly Taxol Herceptin for 12 weeks followed by Herceptin for a year  We will do an Oncotype DX score on the hormone positive tumor which is low-grade unlikely to be high risk    We discussed whether or not radiation would be indicated and except for the fact that there is a positive margin with high-grade DCIS normally she would not need radiation but I recommended a consultation because of the margins and the high grade nature of the DCIS.    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  She is presented at the multidiscipline conference and everybody felt comfortable with weekly Taxol Herceptin for 12 weeks followed by Herceptin for a year and Oncotype testing on the second tumor for which she would receive an aromatase inhibitor for 5 years unless it was high risk which is very unlikely    She is here today having had an echo which showed an adequate ejection fraction  Of 61% and she had a port placed and is here to start treatment.    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    Unfortunately the wrong specimen was sent for Oncotype testing and ER negative specimen was sent and I talked to the pathologist and they will send the correct specimen for Oncotype DX testing with no talked to the patient.    That he has no questions and is happy to finally get started with therapy.    Genetic testing results are negative using the INVITAE  panel  -      Current Outpatient Medications on File Prior to Visit   Medication Sig Dispense Refill   • chlorthalidone (HYGROTON) 25 MG tablet Take 25 mg by mouth Every Night.     • eszopiclone (LUNESTA) 3 MG tablet Take 1 tablet by mouth every night at bedtime. 30 tablet 0   • fexofenadine-pseudoephedrine (ALLEGRA-D 24) 180-240 MG per 24 hr tablet Take 1 tablet by mouth Daily.     • omeprazole-sodium bicarbonate (ZEGERID)  MG per capsule Take 1 capsule by mouth Every Night.     • ondansetron (ZOFRAN) 8 MG tablet Take 1 tablet by mouth Every 8 (Eight) Hours As Needed for Nausea or Vomiting. 30  "tablet 2   • raNITIdine (ZANTAC) 150 MG tablet Take 150 mg by mouth 2 (Two) Times a Day.     • Turmeric 500 MG tablet Take 1,000 mg by mouth Daily.     • Ciprofloxacin (CIPRO PO) Take 1,000 mg by mouth Daily. For 5 days     • HYDROcodone-acetaminophen (NORCO) 5-325 MG per tablet Take 1-2 tablets by mouth Every 4 (Four) Hours As Needed (Pain). 8 tablet 0     No current facility-administered medications on file prior to visit.         ALLERGIES:    Allergies   Allergen Reactions   • Moxifloxacin Anaphylaxis and Rash   • Aloxi [Palonosetron Hcl] Itching   • Latex Rash     Added based on information entered during log entry, please review and add reactions, type, and severity as needed        Social History     Socioeconomic History   • Marital status:      Spouse name: FLORINA Gross   • Number of children: 0   • Years of education: Graduate School   • Highest education level: Not on file   Occupational History   • Occupation: Physician     Employer: [employer]   Tobacco Use   • Smoking status: Never Smoker   • Smokeless tobacco: Never Used   Substance and Sexual Activity   • Alcohol use: Yes     Types: 1 Glasses of wine per week   • Drug use: No   • Sexual activity: Defer     Comment:         Family History   Problem Relation Age of Onset   • Colon cancer Mother 72   • Breast cancer Maternal Aunt 60   • Uterine cancer Maternal Aunt 75   • Stroke Father    • Malig Hyperthermia Neg Hx         Review of Systems   Constitutional: Positive for diaphoresis.   Gastrointestinal:        Chronic indigestion   Endocrine: Positive for heat intolerance.   Neurological: 1-rd3rdgrdrrdarddrderd numbness.        Objective     Vitals:    12/14/18 0900   Pulse: 94   Resp: 14   Temp: 97.6 °F (36.4 °C)   TempSrc: Oral   SpO2: 96%   Weight: 63.7 kg (140 lb 8 oz)   Height: 165 cm (64.96\")   PainSc:   4   PainLoc: Knee  Comment: left knee     Current Status 12/14/2018   ECOG score 0       Physical Exam    GENERAL:  " Well-developed, well-nourished in no acute distress.   SKIN:  Warm, dry without rashes, purpura or petechiae.  EYES:  Pupils equal, round and reactive to light.  EOMs intact.  Conjunctivae normal. Mild opacity of the right eye.   EARS:  Hearing intact.  NOSE:  Septum midline.  No excoriations or nasal discharge.  MOUTH:  Tongue is well-papillated; no stomatitis or ulcers.  Lips normal.  THROAT:  Oropharynx without lesions or exudates.  NECK:  Supple with good range of motion; no thyromegaly or masses, no JVD.  LYMPHATICS:  No cervical, supraclavicular, axillary or inguinal adenopathy.  CHEST:  Lungs clear to auscultation. Good airflow.  CARDIAC:  Regular rate and rhythm without murmurs, rubs or gallops. Normal S1,S2.  ABDOMEN:  Soft, nontender with no hepatosplenomegaly or masses.  EXTREMITIES:  No clubbing, cyanosis or edema.  NEUROLOGICAL:  Cranial Nerves II-XII grossly intact.  No focal neurological deficits.  PSYCHIATRIC:  Normal affect and mood.        RECENT LABS:  Hematology WBC   Date Value Ref Range Status   12/14/2018 4.86 4.00 - 10.00 10*3/mm3 Final     RBC   Date Value Ref Range Status   12/14/2018 3.79 (L) 3.90 - 5.00 10*6/mm3 Final     Hemoglobin   Date Value Ref Range Status   12/14/2018 9.9 (L) 11.5 - 14.9 g/dL Final     Hematocrit   Date Value Ref Range Status   12/14/2018 31.8 (L) 34.0 - 45.0 % Final     Platelets   Date Value Ref Range Status   12/14/2018 339 150 - 375 10*3/mm3 Final        SYNOPTIC REPORT (Based on [facility] Cancer Case Summary, version January 2018):                Procedure: Total mastectomy.               Specimen/tumor laterality: Right.                Tumor site: Lower inner quadrant and central/inferior breast.                 Tumor size (greastest dimension of largest invasive focus): 1 cm (lower inner                       quadrant tumor).               Histologic type: Invasive carcinoma of no special type (ductal, not otherwise specified).                 Histologic grade  (Rebeca Histologic Score):                              Glandular (acinar)/tubular differentiation: Score 3.                             Nuclear pleomorphism: Score 3.                             Mitotic rate: Score 2.                            Overall grade: Grade 3 (score 8 of 9).                            NOTE: The above Rebeca Histologic Score applies to the largest and                                    highest grade tumor (lower inner quadrant tumor).  The tumor in the                                    central/inferior breast is intermediate grade (tubular score 3, nuclear                                    score 2, mitotic score 1).               Tumor focality: Multiple foci of invasive carcinoma.                             Number of foci: 2.                              Sizes of individual foci: 1 cm (lower inner quadrant tumor) and approximately                                    0.8 cm (central/inferior tumor).               Ductal carcinoma in situ (DCIS): Present, positive for extensive intraductal component.                            Size (extent) of DCIS: Approximately greater than 6 cm (estimated based                                    on gross and microscopic findings).                            Architectural pattern: Solid and cribriform.                            Nuclear grade: Grade III (high).                            Necrosis: Central comedonecrosis.               Lobular carcinoma in situ (LCIS): No LCIS in specimen.               Margins:                             Invasive carcinoma margins: Uninvolved by invasive carcinoma.                                          Distance from closest margin: 2 mm (deep margin).                             DCIS margins: Positive for DCIS (inferior medial margin [lower inner quadrant]).                                          Additional DCIS margins: DCIS extends to within 2 mm of deep margin                                                  and to within 3 mm of inferior superficial soft tissue margin.  Atypical                                                 ductal hyperplasia is focally present at the deep margin.               Regional lymph nodes: Involved by tumor cells.                             Number of lymph nodes with macrometastases: 0.                            Number of lymph nodes with micrometastases: 0.                            Number of lymph nodes with isolated tumor cells: 1.                            Number of lymph nodes examined: 3.                             Number of sentinel lymph nodes examined: 3.                Treatment effect: No known presurgical therapy.                Lymphovascular invasion: Present.               Dermal lymphovascular invasion: Not identified.                Pathologic stage classification:                             TNM descriptors: m (multiple foci of invasive carcinoma).                             Primary tumor: pT1b.                            Regional lymph nodes: pN0(i+)(sn).                             Distant metastasis: Not applicable.                Additional pathologic findings:                              Ductal hyperplasia of the usual type.                              Fibroinflammatory changes consistent with sites of prior biopsy (2 prior biopsy                                    sites).                 Ancillary studies:ER MS negative HER-2 positive lower inner quadrant tumor       Assessment/Plan   1.  Multifocal right breast cancer T1 BN(ic)-isolated tumor cells medial tumor being grade 3 ER/MS negative HER-2 3+ lateral tumor being T1b N0 grade 1 ER/MS positive HER-2 2+ negative fish Oncotype score of 4  ·   Positive family history of multiple malignancies genetic testing negative  · Weekly Taxol Herceptin ×12 planned followed by aromatase inhibitor for her second tumor    She is here today for her 12th and final cycle of Taxol Herceptin and continues to tolerate quite  well.  .    2.  Wound dehiscence no signs of infection resutured    3. Blurry vision.    Plan  1.  Taxol Herceptin #12 today same dose  2 Paxman cooling system  3. Follow up with opthalmology as scheduled. We reviewed signs and symptoms for which she needs to call the office such as diplopia, increased blurry vision, new neurologic side effects.  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  She does feel her blurry vision is related to cataracts.  4.  Echo to be performed on 2/17/2018 as already scheduled. She will then start Herceptin as already scheduled on 1/4/2018.  5. She will see Dr Treviño as already scheduled on 1/25/2018. She is scheduled for Herceptin this day.

## 2018-12-17 ENCOUNTER — HOSPITAL ENCOUNTER (OUTPATIENT)
Dept: CARDIOLOGY | Facility: HOSPITAL | Age: 63
Discharge: HOME | End: 2018-12-17
Attending: INTERNAL MEDICINE | Admitting: INTERNAL MEDICINE

## 2018-12-17 DIAGNOSIS — Z17.1 MALIGNANT NEOPLASM OF OVERLAPPING SITES OF RIGHT BREAST IN FEMALE, ESTROGEN RECEPTOR NEGATIVE (HCC): ICD-10-CM

## 2018-12-17 DIAGNOSIS — C50.811 MALIGNANT NEOPLASM OF OVERLAPPING SITES OF RIGHT BREAST IN FEMALE, ESTROGEN RECEPTOR NEGATIVE (HCC): ICD-10-CM

## 2018-12-17 DIAGNOSIS — Z45.2 FITTING AND ADJUSTMENT OF VASCULAR CATHETER: ICD-10-CM

## 2018-12-17 PROCEDURE — 93306 TTE W/DOPPLER COMPLETE: CPT | Performed by: INTERNAL MEDICINE

## 2018-12-17 PROCEDURE — 93306 TTE W/DOPPLER COMPLETE: CPT

## 2018-12-17 PROCEDURE — 25010000002 PERFLUTREN (DEFINITY) 8.476 MG IN SODIUM CHLORIDE 0.9 % 10 ML INJECTION: Performed by: INTERNAL MEDICINE

## 2018-12-17 RX ADMIN — PERFLUTREN 2 ML: 6.52 INJECTION, SUSPENSION INTRAVENOUS at 09:36

## 2018-12-18 LAB
BH CV ECHO MEAS - AO MAX PG (FULL): -0.47 MMHG
BH CV ECHO MEAS - AO MAX PG: 5 MMHG
BH CV ECHO MEAS - AO MEAN PG (FULL): -0.16 MMHG
BH CV ECHO MEAS - AO MEAN PG: 2.8 MMHG
BH CV ECHO MEAS - AO V2 MAX: 111.3 CM/SEC
BH CV ECHO MEAS - AO V2 MEAN: 77 CM/SEC
BH CV ECHO MEAS - AO V2 VTI: 22 CM
BH CV ECHO MEAS - AVA(I,A): 2.5 CM^2
BH CV ECHO MEAS - AVA(I,D): 2.5 CM^2
BH CV ECHO MEAS - AVA(V,A): 2.6 CM^2
BH CV ECHO MEAS - AVA(V,D): 2.6 CM^2
BH CV ECHO MEAS - BSA(HAYCOCK): 1.7 M^2
BH CV ECHO MEAS - BSA: 1.7 M^2
BH CV ECHO MEAS - BZI_BMI: 23.5 KILOGRAMS/M^2
BH CV ECHO MEAS - BZI_METRIC_HEIGHT: 162.6 CM
BH CV ECHO MEAS - BZI_METRIC_WEIGHT: 62.1 KG
BH CV ECHO MEAS - EDV(CUBED): 38.6 ML
BH CV ECHO MEAS - EDV(MOD-SP2): 46 ML
BH CV ECHO MEAS - EDV(MOD-SP4): 54 ML
BH CV ECHO MEAS - EDV(TEICH): 46.8 ML
BH CV ECHO MEAS - EF(CUBED): 63.3 %
BH CV ECHO MEAS - EF(MOD-BP): 57 %
BH CV ECHO MEAS - EF(MOD-SP2): 63 %
BH CV ECHO MEAS - EF(MOD-SP4): 57.4 %
BH CV ECHO MEAS - EF(TEICH): 56 %
BH CV ECHO MEAS - ESV(CUBED): 14.2 ML
BH CV ECHO MEAS - ESV(MOD-SP2): 17 ML
BH CV ECHO MEAS - ESV(MOD-SP4): 23 ML
BH CV ECHO MEAS - ESV(TEICH): 20.6 ML
BH CV ECHO MEAS - IVS/LVPW: 1.1
BH CV ECHO MEAS - IVSD: 1.2 CM
BH CV ECHO MEAS - LAT PEAK E' VEL: 7 CM/SEC
BH CV ECHO MEAS - LV DIASTOLIC VOL/BSA (35-75): 32.4 ML/M^2
BH CV ECHO MEAS - LV MASS(C)D: 112.2 GRAMS
BH CV ECHO MEAS - LV MASS(C)DI: 67.4 GRAMS/M^2
BH CV ECHO MEAS - LV MAX PG: 5.4 MMHG
BH CV ECHO MEAS - LV MEAN PG: 2.9 MMHG
BH CV ECHO MEAS - LV SYSTOLIC VOL/BSA (12-30): 13.8 ML/M^2
BH CV ECHO MEAS - LV V1 MAX: 116.4 CM/SEC
BH CV ECHO MEAS - LV V1 MEAN: 79.3 CM/SEC
BH CV ECHO MEAS - LV V1 VTI: 22.1 CM
BH CV ECHO MEAS - LVIDD: 3.4 CM
BH CV ECHO MEAS - LVIDS: 2.4 CM
BH CV ECHO MEAS - LVLD AP2: 6.1 CM
BH CV ECHO MEAS - LVLD AP4: 6.6 CM
BH CV ECHO MEAS - LVLS AP2: 5.6 CM
BH CV ECHO MEAS - LVLS AP4: 5.6 CM
BH CV ECHO MEAS - LVOT AREA (M): 2.5 CM^2
BH CV ECHO MEAS - LVOT AREA: 2.5 CM^2
BH CV ECHO MEAS - LVOT DIAM: 1.8 CM
BH CV ECHO MEAS - LVPWD: 1 CM
BH CV ECHO MEAS - MED PEAK E' VEL: 5 CM/SEC
BH CV ECHO MEAS - MR MAX PG: 5.3 MMHG
BH CV ECHO MEAS - MR MAX VEL: 115.4 CM/SEC
BH CV ECHO MEAS - MV A DUR: 0.1 SEC
BH CV ECHO MEAS - MV A MAX VEL: 101.9 CM/SEC
BH CV ECHO MEAS - MV DEC SLOPE: 281.9 CM/SEC^2
BH CV ECHO MEAS - MV DEC TIME: 0.24 SEC
BH CV ECHO MEAS - MV E MAX VEL: 64 CM/SEC
BH CV ECHO MEAS - MV E/A: 0.63
BH CV ECHO MEAS - MV MAX PG: 5.3 MMHG
BH CV ECHO MEAS - MV MEAN PG: 2.4 MMHG
BH CV ECHO MEAS - MV P1/2T MAX VEL: 64.5 CM/SEC
BH CV ECHO MEAS - MV P1/2T: 67 MSEC
BH CV ECHO MEAS - MV V2 MAX: 115.4 CM/SEC
BH CV ECHO MEAS - MV V2 MEAN: 74.4 CM/SEC
BH CV ECHO MEAS - MV V2 VTI: 18.1 CM
BH CV ECHO MEAS - MVA P1/2T LCG: 3.4 CM^2
BH CV ECHO MEAS - MVA(P1/2T): 3.3 CM^2
BH CV ECHO MEAS - MVA(VTI): 3.1 CM^2
BH CV ECHO MEAS - PA MAX PG (FULL): 1.4 MMHG
BH CV ECHO MEAS - PA MAX PG: 3.2 MMHG
BH CV ECHO MEAS - PA V2 MAX: 89.6 CM/SEC
BH CV ECHO MEAS - PULM A REVS DUR: 0.11 SEC
BH CV ECHO MEAS - PULM A REVS VEL: 28.6 CM/SEC
BH CV ECHO MEAS - PULM DIAS VEL: 30.6 CM/SEC
BH CV ECHO MEAS - PULM S/D: 1.7
BH CV ECHO MEAS - PULM SYS VEL: 53.4 CM/SEC
BH CV ECHO MEAS - RAP SYSTOLE: 3 MMHG
BH CV ECHO MEAS - RV MAX PG: 1.8 MMHG
BH CV ECHO MEAS - RV MEAN PG: 0.97 MMHG
BH CV ECHO MEAS - RV V1 MAX: 67.9 CM/SEC
BH CV ECHO MEAS - RV V1 MEAN: 45.5 CM/SEC
BH CV ECHO MEAS - RV V1 VTI: 14.3 CM
BH CV ECHO MEAS - SI(CUBED): 14.7 ML/M^2
BH CV ECHO MEAS - SI(LVOT): 33.4 ML/M^2
BH CV ECHO MEAS - SI(MOD-SP2): 17.4 ML/M^2
BH CV ECHO MEAS - SI(MOD-SP4): 18.6 ML/M^2
BH CV ECHO MEAS - SI(TEICH): 15.7 ML/M^2
BH CV ECHO MEAS - SUP REN AO DIAM: 2 CM
BH CV ECHO MEAS - SV(CUBED): 24.4 ML
BH CV ECHO MEAS - SV(LVOT): 55.6 ML
BH CV ECHO MEAS - SV(MOD-SP2): 29 ML
BH CV ECHO MEAS - SV(MOD-SP4): 31 ML
BH CV ECHO MEAS - SV(TEICH): 26.2 ML
BH CV ECHO MEAS - TAPSE (>1.6): 2 CM2
BH CV ECHO MEASUREMENTS AVERAGE E/E' RATIO: 10.67
BH CV XLRA - RV BASE: 2.4 CM
BH CV XLRA - TDI S': 12 CM/SEC
LEFT ATRIUM VOLUME INDEX: 13 ML/M2
LV EF 2D ECHO EST: 57 %
MAXIMAL PREDICTED HEART RATE: 158 BPM
STRESS TARGET HR: 134 BPM

## 2018-12-24 PROCEDURE — S0354 CANCER TREATMENT PLAN CHANGE: HCPCS | Performed by: INTERNAL MEDICINE

## 2019-01-02 DIAGNOSIS — Z17.1 MALIGNANT NEOPLASM OF OVERLAPPING SITES OF RIGHT BREAST IN FEMALE, ESTROGEN RECEPTOR NEGATIVE (HCC): ICD-10-CM

## 2019-01-02 DIAGNOSIS — C50.811 MALIGNANT NEOPLASM OF OVERLAPPING SITES OF RIGHT BREAST IN FEMALE, ESTROGEN RECEPTOR NEGATIVE (HCC): ICD-10-CM

## 2019-01-04 ENCOUNTER — INFUSION (OUTPATIENT)
Dept: ONCOLOGY | Facility: HOSPITAL | Age: 64
End: 2019-01-04

## 2019-01-04 VITALS
BODY MASS INDEX: 22.69 KG/M2 | SYSTOLIC BLOOD PRESSURE: 136 MMHG | HEART RATE: 106 BPM | WEIGHT: 136.2 LBS | DIASTOLIC BLOOD PRESSURE: 85 MMHG

## 2019-01-04 DIAGNOSIS — Z17.1 MALIGNANT NEOPLASM OF OVERLAPPING SITES OF RIGHT BREAST IN FEMALE, ESTROGEN RECEPTOR NEGATIVE (HCC): ICD-10-CM

## 2019-01-04 DIAGNOSIS — Z79.899 ENCOUNTER FOR LONG-TERM (CURRENT) USE OF HIGH-RISK MEDICATION: ICD-10-CM

## 2019-01-04 DIAGNOSIS — Z17.1 MALIGNANT NEOPLASM OF OVERLAPPING SITES OF RIGHT BREAST IN FEMALE, ESTROGEN RECEPTOR NEGATIVE (HCC): Primary | ICD-10-CM

## 2019-01-04 DIAGNOSIS — C50.811 MALIGNANT NEOPLASM OF OVERLAPPING SITES OF RIGHT BREAST IN FEMALE, ESTROGEN RECEPTOR NEGATIVE (HCC): Primary | ICD-10-CM

## 2019-01-04 DIAGNOSIS — C50.811 MALIGNANT NEOPLASM OF OVERLAPPING SITES OF RIGHT BREAST IN FEMALE, ESTROGEN RECEPTOR NEGATIVE (HCC): ICD-10-CM

## 2019-01-04 DIAGNOSIS — Z45.2 FITTING AND ADJUSTMENT OF VASCULAR CATHETER: ICD-10-CM

## 2019-01-04 LAB
ALBUMIN SERPL-MCNC: 4.3 G/DL (ref 3.5–5.2)
ALBUMIN/GLOB SERPL: 1.6 G/DL (ref 1.1–2.4)
ALP SERPL-CCNC: 94 U/L (ref 38–116)
ALT SERPL W P-5'-P-CCNC: 26 U/L (ref 0–33)
ANION GAP SERPL CALCULATED.3IONS-SCNC: 13.2 MMOL/L
AST SERPL-CCNC: 19 U/L (ref 0–32)
BASOPHILS # BLD AUTO: 0.07 10*3/MM3 (ref 0–0.1)
BASOPHILS NFR BLD AUTO: 1 % (ref 0–1.1)
BILIRUB SERPL-MCNC: 0.3 MG/DL (ref 0.1–1.2)
BUN BLD-MCNC: 19 MG/DL (ref 6–20)
BUN/CREAT SERPL: 25.3 (ref 7.3–30)
CALCIUM SPEC-SCNC: 9.5 MG/DL (ref 8.5–10.2)
CHLORIDE SERPL-SCNC: 100 MMOL/L (ref 98–107)
CO2 SERPL-SCNC: 27.8 MMOL/L (ref 22–29)
CREAT BLD-MCNC: 0.75 MG/DL (ref 0.6–1.1)
DEPRECATED RDW RBC AUTO: 59.2 FL (ref 37–49)
EOSINOPHIL # BLD AUTO: 0.3 10*3/MM3 (ref 0–0.36)
EOSINOPHIL NFR BLD AUTO: 4.2 % (ref 1–5)
ERYTHROCYTE [DISTWIDTH] IN BLOOD BY AUTOMATED COUNT: 19.2 % (ref 11.7–14.5)
GFR SERPL CREATININE-BSD FRML MDRD: 78 ML/MIN/1.73
GFR SERPL CREATININE-BSD FRML MDRD: 95 ML/MIN/1.73
GLOBULIN UR ELPH-MCNC: 2.7 GM/DL (ref 1.8–3.5)
GLUCOSE BLD-MCNC: 134 MG/DL (ref 74–124)
HCT VFR BLD AUTO: 34 % (ref 34–45)
HGB BLD-MCNC: 10.5 G/DL (ref 11.5–14.9)
IMM GRANULOCYTES # BLD AUTO: 0.03 10*3/MM3 (ref 0–0.03)
IMM GRANULOCYTES NFR BLD AUTO: 0.4 % (ref 0–0.5)
LYMPHOCYTES # BLD AUTO: 1.73 10*3/MM3 (ref 1–3.5)
LYMPHOCYTES NFR BLD AUTO: 24 % (ref 20–49)
MCH RBC QN AUTO: 26.1 PG (ref 27–33)
MCHC RBC AUTO-ENTMCNC: 30.9 G/DL (ref 32–35)
MCV RBC AUTO: 84.6 FL (ref 83–97)
MONOCYTES # BLD AUTO: 0.67 10*3/MM3 (ref 0.25–0.8)
MONOCYTES NFR BLD AUTO: 9.3 % (ref 4–12)
NEUTROPHILS # BLD AUTO: 4.4 10*3/MM3 (ref 1.5–7)
NEUTROPHILS NFR BLD AUTO: 61.1 % (ref 39–75)
NRBC BLD AUTO-RTO: 0 /100 WBC (ref 0–0)
PLATELET # BLD AUTO: 346 10*3/MM3 (ref 150–375)
PMV BLD AUTO: 8 FL (ref 8.9–12.1)
POTASSIUM BLD-SCNC: 3.5 MMOL/L (ref 3.5–4.7)
PROT SERPL-MCNC: 7 G/DL (ref 6.3–8)
RBC # BLD AUTO: 4.02 10*6/MM3 (ref 3.9–5)
SODIUM BLD-SCNC: 141 MMOL/L (ref 134–145)
WBC NRBC COR # BLD: 7.2 10*3/MM3 (ref 4–10)

## 2019-01-04 PROCEDURE — 96413 CHEMO IV INFUSION 1 HR: CPT | Performed by: INTERNAL MEDICINE

## 2019-01-04 PROCEDURE — 85025 COMPLETE CBC W/AUTO DIFF WBC: CPT

## 2019-01-04 PROCEDURE — 25010000002 TRASTUZUMAB PER 10 MG: Performed by: NURSE PRACTITIONER

## 2019-01-04 PROCEDURE — 80053 COMPREHEN METABOLIC PANEL: CPT

## 2019-01-04 PROCEDURE — 63710000001 DIPHENHYDRAMINE PER 50 MG: Performed by: NURSE PRACTITIONER

## 2019-01-04 RX ORDER — SODIUM CHLORIDE 0.9 % (FLUSH) 0.9 %
10 SYRINGE (ML) INJECTION AS NEEDED
Status: DISCONTINUED | OUTPATIENT
Start: 2019-01-04 | End: 2019-01-04 | Stop reason: HOSPADM

## 2019-01-04 RX ORDER — SODIUM CHLORIDE 0.9 % (FLUSH) 0.9 %
10 SYRINGE (ML) INJECTION AS NEEDED
Status: CANCELLED | OUTPATIENT
Start: 2019-01-04

## 2019-01-04 RX ORDER — DIPHENHYDRAMINE HCL 25 MG
25 CAPSULE ORAL ONCE
Status: COMPLETED | OUTPATIENT
Start: 2019-01-04 | End: 2019-01-04

## 2019-01-04 RX ORDER — SODIUM CHLORIDE 9 MG/ML
250 INJECTION, SOLUTION INTRAVENOUS ONCE
Status: COMPLETED | OUTPATIENT
Start: 2019-01-04 | End: 2019-01-04

## 2019-01-04 RX ADMIN — SODIUM CHLORIDE 250 ML: 900 INJECTION, SOLUTION INTRAVENOUS at 08:53

## 2019-01-04 RX ADMIN — SODIUM CHLORIDE, PRESERVATIVE FREE 10 ML: 5 INJECTION INTRAVENOUS at 09:56

## 2019-01-04 RX ADMIN — DIPHENHYDRAMINE HYDROCHLORIDE 25 MG: 25 CAPSULE ORAL at 08:55

## 2019-01-04 RX ADMIN — TRASTUZUMAB 380 MG: 150 INJECTION, POWDER, LYOPHILIZED, FOR SOLUTION INTRAVENOUS at 09:20

## 2019-01-04 RX ADMIN — Medication 500 UNITS: at 09:56

## 2019-01-24 DIAGNOSIS — C50.811 MALIGNANT NEOPLASM OF OVERLAPPING SITES OF RIGHT BREAST IN FEMALE, ESTROGEN RECEPTOR NEGATIVE (HCC): ICD-10-CM

## 2019-01-24 DIAGNOSIS — Z17.1 MALIGNANT NEOPLASM OF OVERLAPPING SITES OF RIGHT BREAST IN FEMALE, ESTROGEN RECEPTOR NEGATIVE (HCC): ICD-10-CM

## 2019-01-24 PROCEDURE — S0354 CANCER TREATMENT PLAN CHANGE: HCPCS | Performed by: INTERNAL MEDICINE

## 2019-01-25 ENCOUNTER — INFUSION (OUTPATIENT)
Dept: ONCOLOGY | Facility: HOSPITAL | Age: 64
End: 2019-01-25

## 2019-01-25 ENCOUNTER — OFFICE VISIT (OUTPATIENT)
Dept: ONCOLOGY | Facility: CLINIC | Age: 64
End: 2019-01-25

## 2019-01-25 VITALS
HEIGHT: 65 IN | SYSTOLIC BLOOD PRESSURE: 120 MMHG | WEIGHT: 134.3 LBS | TEMPERATURE: 97.7 F | DIASTOLIC BLOOD PRESSURE: 72 MMHG | HEART RATE: 91 BPM | OXYGEN SATURATION: 96 % | RESPIRATION RATE: 14 BRPM | BODY MASS INDEX: 22.38 KG/M2

## 2019-01-25 DIAGNOSIS — Z17.1 MALIGNANT NEOPLASM OF OVERLAPPING SITES OF RIGHT BREAST IN FEMALE, ESTROGEN RECEPTOR NEGATIVE (HCC): Primary | ICD-10-CM

## 2019-01-25 DIAGNOSIS — C50.811 MALIGNANT NEOPLASM OF OVERLAPPING SITES OF RIGHT BREAST IN FEMALE, ESTROGEN RECEPTOR NEGATIVE (HCC): Primary | ICD-10-CM

## 2019-01-25 DIAGNOSIS — Z17.1 MALIGNANT NEOPLASM OF OVERLAPPING SITES OF RIGHT BREAST IN FEMALE, ESTROGEN RECEPTOR NEGATIVE (HCC): ICD-10-CM

## 2019-01-25 DIAGNOSIS — Z79.899 ENCOUNTER FOR LONG-TERM (CURRENT) USE OF HIGH-RISK MEDICATION: ICD-10-CM

## 2019-01-25 DIAGNOSIS — C50.811 MALIGNANT NEOPLASM OF OVERLAPPING SITES OF RIGHT BREAST IN FEMALE, ESTROGEN RECEPTOR NEGATIVE (HCC): ICD-10-CM

## 2019-01-25 LAB
BASOPHILS # BLD AUTO: 0.03 10*3/MM3 (ref 0–0.1)
BASOPHILS NFR BLD AUTO: 0.4 % (ref 0–1.1)
DEPRECATED RDW RBC AUTO: 50.8 FL (ref 37–49)
EOSINOPHIL # BLD AUTO: 0.18 10*3/MM3 (ref 0–0.36)
EOSINOPHIL NFR BLD AUTO: 2.6 % (ref 1–5)
ERYTHROCYTE [DISTWIDTH] IN BLOOD BY AUTOMATED COUNT: 16.3 % (ref 11.7–14.5)
HCT VFR BLD AUTO: 35.6 % (ref 34–45)
HGB BLD-MCNC: 11 G/DL (ref 11.5–14.9)
IMM GRANULOCYTES # BLD AUTO: 0.01 10*3/MM3 (ref 0–0.03)
IMM GRANULOCYTES NFR BLD AUTO: 0.1 % (ref 0–0.5)
LYMPHOCYTES # BLD AUTO: 1.73 10*3/MM3 (ref 1–3.5)
LYMPHOCYTES NFR BLD AUTO: 25.3 % (ref 20–49)
MCH RBC QN AUTO: 26.2 PG (ref 27–33)
MCHC RBC AUTO-ENTMCNC: 30.9 G/DL (ref 32–35)
MCV RBC AUTO: 84.8 FL (ref 83–97)
MONOCYTES # BLD AUTO: 0.58 10*3/MM3 (ref 0.25–0.8)
MONOCYTES NFR BLD AUTO: 8.5 % (ref 4–12)
NEUTROPHILS # BLD AUTO: 4.32 10*3/MM3 (ref 1.5–7)
NEUTROPHILS NFR BLD AUTO: 63.1 % (ref 39–75)
NRBC BLD AUTO-RTO: 0 /100 WBC (ref 0–0)
PLATELET # BLD AUTO: 403 10*3/MM3 (ref 150–375)
PMV BLD AUTO: 8.3 FL (ref 8.9–12.1)
RBC # BLD AUTO: 4.2 10*6/MM3 (ref 3.9–5)
WBC NRBC COR # BLD: 6.85 10*3/MM3 (ref 4–10)

## 2019-01-25 PROCEDURE — 85025 COMPLETE CBC W/AUTO DIFF WBC: CPT

## 2019-01-25 PROCEDURE — 99215 OFFICE O/P EST HI 40 MIN: CPT | Performed by: INTERNAL MEDICINE

## 2019-01-25 PROCEDURE — 96413 CHEMO IV INFUSION 1 HR: CPT | Performed by: INTERNAL MEDICINE

## 2019-01-25 PROCEDURE — 25010000002 TRASTUZUMAB PER 10 MG: Performed by: INTERNAL MEDICINE

## 2019-01-25 RX ORDER — SODIUM CHLORIDE 9 MG/ML
250 INJECTION, SOLUTION INTRAVENOUS ONCE
Status: CANCELLED | OUTPATIENT
Start: 2019-02-15

## 2019-01-25 RX ORDER — ANASTROZOLE 1 MG/1
1 TABLET ORAL DAILY
Qty: 90 TABLET | Refills: 1 | Status: SHIPPED | OUTPATIENT
Start: 2019-01-25 | End: 2019-03-29

## 2019-01-25 RX ORDER — DIPHENHYDRAMINE HCL 12.5MG/5ML
12.5 LIQUID (ML) ORAL ONCE
Status: COMPLETED | OUTPATIENT
Start: 2019-01-25 | End: 2019-01-25

## 2019-01-25 RX ORDER — DIPHENHYDRAMINE HCL 25 MG
25 CAPSULE ORAL ONCE
Status: CANCELLED
Start: 2019-02-15 | End: 2019-02-15

## 2019-01-25 RX ORDER — CELECOXIB 100 MG/1
100 CAPSULE ORAL AS NEEDED
COMMUNITY
End: 2019-06-21

## 2019-01-25 RX ORDER — SODIUM CHLORIDE 9 MG/ML
250 INJECTION, SOLUTION INTRAVENOUS ONCE
Status: CANCELLED | OUTPATIENT
Start: 2019-01-25

## 2019-01-25 RX ORDER — DIPHENHYDRAMINE HCL 25 MG
25 CAPSULE ORAL ONCE
Status: CANCELLED
Start: 2019-03-08 | End: 2019-03-08

## 2019-01-25 RX ORDER — SODIUM CHLORIDE 9 MG/ML
250 INJECTION, SOLUTION INTRAVENOUS ONCE
Status: CANCELLED | OUTPATIENT
Start: 2019-03-08

## 2019-01-25 RX ORDER — SODIUM CHLORIDE 0.9 % (FLUSH) 0.9 %
10 SYRINGE (ML) INJECTION AS NEEDED
Status: CANCELLED | OUTPATIENT
Start: 2019-01-25

## 2019-01-25 RX ORDER — SODIUM CHLORIDE 9 MG/ML
250 INJECTION, SOLUTION INTRAVENOUS ONCE
Status: COMPLETED | OUTPATIENT
Start: 2019-01-25 | End: 2019-01-25

## 2019-01-25 RX ORDER — DIPHENHYDRAMINE HCL 25 MG
25 CAPSULE ORAL ONCE
Status: CANCELLED
Start: 2019-01-25 | End: 2019-01-25

## 2019-01-25 RX ADMIN — DIPHENHYDRAMINE HYDROCHLORIDE 12.5 MG: 25 SOLUTION ORAL at 10:51

## 2019-01-25 RX ADMIN — SODIUM CHLORIDE 250 ML: 900 INJECTION, SOLUTION INTRAVENOUS at 10:51

## 2019-01-25 RX ADMIN — TRASTUZUMAB 380 MG: 150 INJECTION, POWDER, LYOPHILIZED, FOR SOLUTION INTRAVENOUS at 11:31

## 2019-01-25 RX ADMIN — SODIUM CHLORIDE, PRESERVATIVE FREE 300 UNITS: 5 INJECTION INTRAVENOUS at 12:06

## 2019-01-25 NOTE — PROGRESS NOTES
Per v/o Dr Treviño, adjust benadryl to 12.5mg.  12.5mg elixir entered for today and future cycles.

## 2019-01-25 NOTE — PROGRESS NOTES
Subjective     REASON FOR CONSULTATION:    1.Multifocal right breast cancer post bilateral mastectomies- T1b N0+-10:00 tumor 8 mm grade 1 ER/AL positive HER-2 negative Oncotype pending    2.  5:00 tumor right breast ER/AL negative HER-2 positive  3.  Negative genetic testing  4.  Adequate echo weekly Taxol Herceptin started on 9/21/18                          Referring physician Kwaku Gudino M.D.      History of Present Illness : This is a 62-year-old lady with a multifocal right breast cancer T1b N0+ ER/AL negative HER-2 positive and the second T1b ER/AL positive HER-2 negative tumor here for maintenance Herceptin.  She is also here to discuss Arimidex which we will start for her second tumor which was a low risk hormone positive tumor.    She is already having pain in her left knee and wants to see an orthopedic surgeon for this pain is pretty sharp and she is taking Celebrex to help deal with this    We discussed the side effects of Arimidex including hot flashes and joint pains but she is wanting to do this rather than tamoxifen because she does not like the side effect profile of tamoxifen    She's never had a bone density and we will order this also and make sure it is adequate    She has no significant neuropathy and she is happy to be off the Taxol-she tolerated the Herceptin alone well but wants to cut back on the Benadryl because it makes her too sleepy    She lost a lot of hair in the chronic for him because her Paxman cooling Did not fit well on the vertex of her skull    She reports mild blurry vision that started approximately after the 10th dose of Taxol and her ophthalmologist as she has cataracts that are affecting the macular part of her iron she needs to have surgeries sometime in the next month.  I told her that Herceptin does not cause this and all the  steroid premeds may have accelerated to cataract     She denies headaches, speech disturbances, photophobia, new neuropathy.     Her  appetite is good. Her bowels and bladder are working well.     Past Medical History:   Diagnosis Date   • Environmental allergies    • GERD (gastroesophageal reflux disease)    • History of foreign travel     [place]   • Hypertension    • Malignant neoplasm of overlapping sites of right breast in female, estrogen receptor positive (CMS/HCC) 7/18/2018        Past Surgical History:   Procedure Laterality Date   • BREAST BIOPSY Right 2018    Malignant   • BREAST RECONSTRUCTION Bilateral 8/1/2018    Procedure: BILATERAL BREAST TISSUE EXPANDER INSERTION WITH ALLODERM;  Surgeon: Waterhouse, Maurine, MD;  Location: [facility] MAIN OR;  Service: Plastics   • COLONOSCOPY N/A 3/24/2017    Procedure: COLONOSCOPY TO CECUM AND TI;  Surgeon: Aleksander Bella MD;  Location: [facility] ENDOSCOPY;  Service:    • ENDOSCOPY N/A 3/24/2017    Procedure: ESOPHAGOGASTRODUODENOSCOPY;  Surgeon: Aleksander Bella MD;  Location: [facility] ENDOSCOPY;  Service:    • HYSTERECTOMY  1989   • MASTECTOMY W/ SENTINEL NODE BIOPSY Bilateral 8/1/2018    Procedure: BILATERAL TOTAL MASTECTOMY WITH RIGHT SENTINEL LYMPH MARTINEZ BIOPSY;  Surgeon: Kwaku Gudino MD;  Location: [facility] OR;  Service: General   • OOPHORECTOMY Bilateral 1989   • VENOUS ACCESS DEVICE (PORT) INSERTION Left 9/14/2018    Procedure: INSERTION VENOUS ACCESS DEVICE;  Surgeon: Kwaku Gudino MD;  Location: [facility] OR;  Service: General      ONCOLOGIC HISTORY;  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  The patient had never had a previous mammogram and was referred for evaluation at which time she was found to have a 4.2 cm mass in the upper outer quadrant of the right breast at the 9:30 position associated with pleomorphic calcifications in the lower inner quadrant also felt to be suspicious left breast was benign.  Patient underwent ultrasound guided biopsy on 6/15/18 with  the biopsy at the 9:30 position with clip placement showing invasive mammary carcinoma ductal type low-grade ER % WV 9800% HER-2 2+ negative by fish.  Patient also underwent biopsy at the 4:00 position which showed a microscopic focus of invasive ductal carcinoma felt to be probably a lymphatic space involvement and was too small to do additional testing and a clip was placed.  Patient went on to have bilateral breast MRIs and was referred to Dr. Gudino MRIs confirmed 2 areas of abnormality at the breast-lower inner quadrant showed a large hematoma with a clip identified and there is extensive clumped abnormal enhancement beginning superior to the biopsy cavity and extending laterally to the entire upper half of the right breast into the upper outer quadrant measuring 1.2 x 1.4 cm and extending over 7 cm.  A biopsy clip was identified in this area the abnormal enhancement focally abutted the pectoralis muscle medially with no invasion through few borderline enlarged right axillary nodes with normal morphology no internal mammary adenopathy was noted left breast was benign.  Patient went on to have bilateral mastectomies the final path report showed 2 separate primaries in the breast associated with extensive high-grade DCIS measuring 6 cm  The lower inner quadrant lesion measured 1 cm was a grade 3 invasive ductal carcinoma ER 0 WV 0 HER-2 3+ and the lesion at the 9:30 position was a grade 1 invasive ductal carcinoma measuring 8 mm strongly ER/WV positive and HER-2 negative based on preop testing.  3 sentinel nodes were obtained 1 showed isolated tumor cells.  Margins showed  Positivity with DCIS in the inferior medial margin and was close to the deep margin (2mm) and inferior superficial margin(3mm).  Lymphovascular invasion was present.  Left breast is benign    Postoperatively she's done well and had her expanders filled and is here today to discuss adjuvant treatment.  Unfortunately she did not know  about the testing on the second tumor at surgery and thought she had a grade 1 ER/RI positive HER-2 negative tumor and was very surprised when I told her the prior markers on the second tumor.    She is  0 para 0 menarche was at age 13 menopause at age 33 when she had a hysterectomy and oophorectomy.  She's been on hormone replacement since age of 33 until her recent diagnosis .    Family history is positive for mother having colon cancer age 72 maternal aunt with breast cancer in her 60s and another maternal aunt with uterine cancer in her 70s.  She's had negative genetic testing but the extended panel is being done and is pending    We discussed treatment plans and I have scheduled her for Chemotherapy education with tentative plans for weekly Taxol Herceptin for 12 weeks followed by Herceptin for a year  We will do an Oncotype DX score on the hormone positive tumor which is low-grade unlikely to be high risk    We discussed whether or not radiation would be indicated and except for the fact that there is a positive margin with high-grade DCIS normally she would not need radiation but I recommended a consultation because of the margins and the high grade nature of the DCIS.    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  She is presented at the multidiscipline conference  and everybody felt comfortable with weekly Taxol Herceptin for 12 weeks followed by Herceptin for a year and Oncotype testing on the second tumor for which she would receive an aromatase inhibitor for 5 years unless it was high risk which is very unlikely    She is here today having had an echo which showed an adequate ejection fraction  Of 61% and she had a port placed and is here to start treatment.    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    Unfortunately the wrong specimen was sent for Oncotype testing and ER negative specimen was sent and I talked to the pathologist and they will send the correct specimen for Oncotype DX testing with no talked to the patient.    That he has no questions and is happy to finally get started with therapy.    Genetic testing results are negative using the [employer]  panel  -      Current Outpatient Medications on File Prior to Visit   Medication Sig Dispense Refill   • celecoxib (CeleBREX) 100 MG capsule Take 100 mg by mouth Daily.     • chlorthalidone (HYGROTON) 25 MG tablet Take 25 mg by mouth Every Night.     • eszopiclone (LUNESTA) 3 MG tablet Take 1 tablet by mouth every night at bedtime. 30 tablet 0   • fexofenadine-pseudoephedrine (ALLEGRA-D 24) 180-240 MG per 24 hr tablet Take 1 tablet by mouth Daily.     • omeprazole-sodium bicarbonate (ZEGERID)  MG per capsule Take 1 capsule by mouth Every Night.     • ondansetron (ZOFRAN) 8 MG tablet Take 1 tablet by mouth Every 8 (Eight) Hours As Needed for Nausea or Vomiting. 30 tablet 2   • raNITIdine (ZANTAC) 150 MG tablet Take 150 mg by mouth Daily.     • Turmeric 500 MG tablet Take 1,000 mg by mouth Daily.     • Ciprofloxacin (CIPRO PO) Take 1,000 mg by mouth Daily. For 5 days    "  • HYDROcodone-acetaminophen (NORCO) 5-325 MG per tablet Take 1-2 tablets by mouth Every 4 (Four) Hours As Needed (Pain). 8 tablet 0     No current facility-administered medications on file prior to visit.         ALLERGIES:    Allergies   Allergen Reactions   • Moxifloxacin Anaphylaxis and Rash   • Aloxi [Palonosetron Hcl] Itching   • Latex Rash     Added based on information entered during log entry, please review and add reactions, type, and severity as needed        Social History     Socioeconomic History   • Marital status:      Spouse name: FLORINA Gross   • Number of children: 0   • Years of education: Graduate School   • Highest education level: Not on file   Occupational History   • Occupation: Physician     Employer: [employer]   Tobacco Use   • Smoking status: Never Smoker   • Smokeless tobacco: Never Used   Substance and Sexual Activity   • Alcohol use: Yes     Types: 1 Glasses of wine per week   • Drug use: No   • Sexual activity: Defer     Comment:         Family History   Problem Relation Age of Onset   • Colon cancer Mother 72   • Breast cancer Maternal Aunt 60   • Uterine cancer Maternal Aunt 75   • Stroke Father    • Malig Hyperthermia Neg Hx         Review of Systems   Constitutional: Positive for diaphoresis.   Gastrointestinal:        Chronic indigestion   Endocrine: Positive for heat intolerance.   Neurological: 1-rd3rdgrdrrdarddrderd numbness.    Left knee pain 6/10 taking Celebrex    Objective     Vitals:    01/25/19 0913   BP: 120/72   Pulse: 91   Resp: 14   Temp: 97.7 °F (36.5 °C)   TempSrc: Oral   SpO2: 96%   Weight: 60.9 kg (134 lb 4.8 oz)   Height: 165 cm (64.96\")     Current Status 1/25/2019   ECOG score 0       Physical Exam    GENERAL:  Well-developed, well-nourished in no acute distress.   SKIN:  Warm, dry without rashes, purpura or petechiae.  EYES:  Pupils equal, round and reactive to light.  EOMs intact.  Conjunctivae normal. Mild opacity of the right " eye.   EARS:  Hearing intact.  NOSE:  Septum midline.  No excoriations or nasal discharge.  MOUTH:  Tongue is well-papillated; no stomatitis or ulcers.  Lips normal.  THROAT:  Oropharynx without lesions or exudates.  NECK:  Supple with good range of motion; no thyromegaly or masses, no JVD.  LYMPHATICS:  No cervical, supraclavicular, axillary or inguinal adenopathy.  CHEST:  Lungs clear to auscultation. Good airflow.  BREATS: Left breast is benign the right breast has an expander in place incision looks clean  CARDIAC:  Regular rate and rhythm without murmurs, rubs or gallops. Normal S1,S2.  ABDOMEN:  Soft, nontender with no hepatosplenomegaly or masses.  EXTREMITIES:  No clubbing, cyanosis or edema.  No obvious fluid or warmth in the left knee  NEUROLOGICAL:  Cranial Nerves II-XII grossly intact.  No focal neurological deficits.  PSYCHIATRIC:  Normal affect and mood.        RECENT LABS:  Hematology WBC   Date Value Ref Range Status   01/25/2019 6.85 4.00 - 10.00 10*3/mm3 Final     RBC   Date Value Ref Range Status   01/25/2019 4.20 3.90 - 5.00 10*6/mm3 Final     Hemoglobin   Date Value Ref Range Status   01/25/2019 11.0 (L) 11.5 - 14.9 g/dL Final     Hematocrit   Date Value Ref Range Status   01/25/2019 35.6 34.0 - 45.0 % Final     Platelets   Date Value Ref Range Status   01/25/2019 403 (H) 150 - 375 10*3/mm3 Final        SYNOPTIC REPORT (Based on [facility] Cancer Case Summary, version January 2018):                Procedure: Total mastectomy.               Specimen/tumor laterality: Right.                Tumor site: Lower inner quadrant and central/inferior breast.                 Tumor size (greastest dimension of largest invasive focus): 1 cm (lower inner                       quadrant tumor).               Histologic type: Invasive carcinoma of no special type (ductal, not otherwise specified).                 Histologic grade ([place] Histologic Score):                              Glandular (acinar)/tubular  differentiation: Score 3.                             Nuclear pleomorphism: Score 3.                             Mitotic rate: Score 2.                            Overall grade: Grade 3 (score 8 of 9).                            NOTE: The above [place] Histologic Score applies to the largest and                                    highest grade tumor (lower inner quadrant tumor).  The tumor in the                                    central/inferior breast is intermediate grade (tubular score 3, nuclear                                    score 2, mitotic score 1).               Tumor focality: Multiple foci of invasive carcinoma.                             Number of foci: 2.                              Sizes of individual foci: 1 cm (lower inner quadrant tumor) and approximately                                    0.8 cm (central/inferior tumor).               Ductal carcinoma in situ (DCIS): Present, positive for extensive intraductal component.                            Size (extent) of DCIS: Approximately greater than 6 cm (estimated based                                    on gross and microscopic findings).                            Architectural pattern: Solid and cribriform.                            Nuclear grade: Grade III (high).                            Necrosis: Central comedonecrosis.               Lobular carcinoma in situ (LCIS): No LCIS in specimen.               Margins:                             Invasive carcinoma margins: Uninvolved by invasive carcinoma.                                          Distance from closest margin: 2 mm (deep margin).                             DCIS margins: Positive for DCIS (inferior medial margin [lower inner quadrant]).                                          Additional DCIS margins: DCIS extends to within 2 mm of deep margin                                                 and to within 3 mm of inferior superficial soft tissue margin.  Atypical                                                  ductal hyperplasia is focally present at the deep margin.               Regional lymph nodes: Involved by tumor cells.                             Number of lymph nodes with macrometastases: 0.                            Number of lymph nodes with micrometastases: 0.                            Number of lymph nodes with isolated tumor cells: 1.                            Number of lymph nodes examined: 3.                             Number of sentinel lymph nodes examined: 3.                Treatment effect: No known presurgical therapy.                Lymphovascular invasion: Present.               Dermal lymphovascular invasion: Not identified.                Pathologic stage classification:                             TNM descriptors: m (multiple foci of invasive carcinoma).                             Primary tumor: pT1b.                            Regional lymph nodes: pN0(i+)(sn).                             Distant metastasis: Not applicable.                Additional pathologic findings:                              Ductal hyperplasia of the usual type.                              Fibroinflammatory changes consistent with sites of prior biopsy (2 prior biopsy                                    sites).                 Ancillary studies:ER PA negative HER-2 positive lower inner quadrant tumor    Echocardiogram  Reading physician: Keily Jensen MD Ordering physician: Cecy Treviño MD Study date: 12/17/18     Study Description     A two-dimensional transthoracic echocardiogram with color flow and Doppler was performed. The study is technically difficult for diagnosis.Verbal consent was obtained from the patient to use Definity contrast in order to optimize the study. The use of Definity was indicated as two or more contiguous segments of the left ventricular endocardial border were unable to be adequately visualized by standard imaging methods. 1.3 mL of mechanically  activated Definity was mixed with 8.7 mL of normal saline. A total of 2 mL of the resulting Definity solution was administered, and the remaining contrast was wasted and discarded.   No adverse reaction to contrast was noted.   9/14/18 EF 61% No GLS              Assessment/Plan   1.  Multifocal right breast cancer T1 BN(ic)-isolated tumor cells medial tumor being grade 3 ER/DC negative HER-2 3+ lateral tumor being T1b N0 grade 1 ER/DC positive HER-2 2+ negative fish Oncotype score of 4  ·   Positive family history of multiple malignancies genetic testing negative  · Weekly Taxol Herceptin ×12 planned followed by aromatase inhibitor for her second tumor  · Arimidex started in 2/19    S2.  Wound dehiscence no signs of infection resutured-extended to be removed in 3/19    3. Blurry vision.  Cataract surgery in 3 weeks    Left knee pain referral to orthopedics planned4.     Plan  1.  Herceptin # 2 maintenance dose today   2.  Arimidex 1 mg to start as adjuvant therapy for her second tumor   3.Repeat echo before the middle of March -DEXA scan also before her next visit  .4.. Referral to orthopedics.   .5. Cataract surgery   6.  Return in 9 weeks to see me with Herceptin in 3 weeks in 6 weeks and we may delay her treatment after her surgery when she sees me

## 2019-01-25 NOTE — PROGRESS NOTES
Subjective     REASON FOR CONSULTATION:    1.Multifocal right breast cancer post bilateral mastectomies- T1b N0+-10:00 tumor 8 mm grade 1 ER/OR positive HER-2 negative Oncotype pending    2.  5:00 tumor right breast ER/OR negative HER-2 positive  3.  Negative genetic testing  4.  Adequate echo weekly Taxol Herceptin started on 9/21/18                          Referring physician Kwaku Gudino M.D.      History of Present Illness : This is a 62-year-old lady with a multifocal right breast cancer T1b N0+ ER/OR negative HER-2 positive and the second T1b ER/OR positive HER-2 negative tumor here for 12th  dose of Taxol Herceptin.      She continues to tolerate treatment quite well citing fatigue as her primary side effect. She continues to take Metanx for neuropathy and this has worked very well for her so day. Neuorpathy of the hand is intermittent and minimal.    Her hair is thinning despite the Paxman system.    She reports mild blurry vision that started approximately one week ago. She denies headaches, speech disturbances, photophobia, new neuropathy. She has a history of cataracts and has an appointment with opthamology next week.    Her appetite is good. Her bowels and bladder are working well.     Past Medical History:   Diagnosis Date   • Environmental allergies    • GERD (gastroesophageal reflux disease)    • History of foreign travel     [place]   • Hypertension    • Malignant neoplasm of overlapping sites of right breast in female, estrogen receptor positive (CMS/HCC) 7/18/2018        Past Surgical History:   Procedure Laterality Date   • BREAST BIOPSY Right 2018    Malignant   • BREAST RECONSTRUCTION Bilateral 8/1/2018    Procedure: BILATERAL BREAST TISSUE EXPANDER INSERTION WITH ALLODERM;  Surgeon: Waterhouse, Maurine, MD;  Location: [facility] MAIN OR;  Service: Plastics   • COLONOSCOPY N/A 3/24/2017    Procedure: COLONOSCOPY TO CECUM AND TI;  Surgeon: Aleksander Bella MD;  Location: [facility] ENDOSCOPY;   Service:    • ENDOSCOPY N/A 3/24/2017    Procedure: ESOPHAGOGASTRODUODENOSCOPY;  Surgeon: Aleksander Bella MD;  Location: [facility] ENDOSCOPY;  Service:    • HYSTERECTOMY  1989   • MASTECTOMY W/ SENTINEL NODE BIOPSY Bilateral 8/1/2018    Procedure: BILATERAL TOTAL MASTECTOMY WITH RIGHT SENTINEL LYMPH MARTINEZ BIOPSY;  Surgeon: Kwaku Gudino MD;  Location: [facility] MAIN OR;  Service: General   • OOPHORECTOMY Bilateral 1989   • VENOUS ACCESS DEVICE (PORT) INSERTION Left 9/14/2018    Procedure: INSERTION VENOUS ACCESS DEVICE;  Surgeon: Kwaku Gudino MD;  Location: 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  The patient had never had a previous mammogram and was referred for evaluation at which time she was found to have a 4.2 cm mass in the upper outer quadrant of the right breast at the 9:30 position associated with pleomorphic calcifications in the lower inner quadrant also felt to be suspicious left breast was benign.  Patient underwent ultrasound guided biopsy on 6/15/18 with the biopsy at the 9:30 position with clip placement showing invasive mammary carcinoma ductal type low-grade ER % CT 9800% HER-2 2+ negative by fish.  Patient also underwent biopsy at the 4:00 position which showed a microscopic focus of invasive ductal carcinoma felt to be probably a lymphatic space involvement and was too small to do additional testing and a clip was placed.  Patient went on to have bilateral breast MRIs and was referred to Dr. Gudino MRIs confirmed 2 areas of abnormality at the breast-lower inner quadrant showed a large hematoma with a clip identified and there is extensive clumped abnormal enhancement beginning superior to the biopsy cavity and extending laterally to the entire upper half of the right breast into the upper outer quadrant  measuring 1.2 x 1.4 cm and extending over 7 cm.  A biopsy clip was identified in this area the abnormal enhancement focally abutted the pectoralis muscle medially with no invasion through few borderline enlarged right axillary nodes with normal morphology no internal mammary adenopathy was noted left breast was benign.  Patient went on to have bilateral mastectomies the final path report showed 2 separate primaries in the breast associated with extensive high-grade DCIS measuring 6 cm  The lower inner quadrant lesion measured 1 cm was a grade 3 invasive ductal carcinoma ER 0 VT 0 HER-2 3+ and the lesion at the 9:30 position was a grade 1 invasive ductal carcinoma measuring 8 mm strongly ER/VT positive and HER-2 negative based on preop testing.  3 sentinel nodes were obtained 1 showed isolated tumor cells.  Margins showed  Positivity with DCIS in the inferior medial margin and was close to the deep margin (2mm) and inferior superficial margin(3mm).  Lymphovascular invasion was present.  Left breast is benign    Postoperatively she's done well and had her expanders filled and is here today to discuss adjuvant treatment.  Unfortunately she did not know about the testing on the second tumor at surgery and thought she had a grade 1 ER/VT positive HER-2 negative tumor and was very surprised when I told her the prior markers on the second tumor.    She is  0 para 0 menarche was at age 13 menopause at age 33 when she had a hysterectomy and oophorectomy.  She's been on hormone replacement since age of 33 until her recent diagnosis .    Family history is positive for mother having colon cancer age 72 maternal aunt with breast cancer in her 60s and another maternal aunt with uterine cancer in her 70s.  She's had negative genetic testing but the extended panel is being done and is pending    We discussed treatment plans and I have scheduled her for Chemotherapy education with tentative plans for weekly Taxol Herceptin  for 12 weeks followed by Herceptin for a year  We will do an Oncotype DX score on the hormone positive tumor which is low-grade unlikely to be high risk    We discussed whether or not radiation would be indicated and except for the fact that there is a positive margin with high-grade DCIS normally she would not need radiation but I recommended a consultation because of the margins and the high grade nature of the DCIS.    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  She is presented at the multidiscipline conference and everybody felt comfortable with weekly Taxol Herceptin for 12 weeks followed by Herceptin for a year and Oncotype testing on the second tumor for which she would receive an aromatase inhibitor for 5 years unless it was high risk which is very unlikely    She is here today having had an echo which showed an adequate ejection fraction  Of 61% and she had a port placed and is here to start treatment.    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    Unfortunately the wrong specimen was sent for Oncotype testing and ER  negative specimen was sent and I talked to the pathologist and they will send the correct specimen for Oncotype DX testing with no talked to the patient.    That he has no questions and is happy to finally get started with therapy.    Genetic testing results are negative using the INVITAE  panel  -      Current Outpatient Medications on File Prior to Visit   Medication Sig Dispense Refill   • chlorthalidone (HYGROTON) 25 MG tablet Take 25 mg by mouth Every Night.     • Ciprofloxacin (CIPRO PO) Take 1,000 mg by mouth Daily. For 5 days     • eszopiclone (LUNESTA) 3 MG tablet Take 1 tablet by mouth every night at bedtime. 30 tablet 0   • fexofenadine-pseudoephedrine (ALLEGRA-D 24) 180-240 MG per 24 hr tablet Take 1 tablet by mouth Daily.     • HYDROcodone-acetaminophen (NORCO) 5-325 MG per tablet Take 1-2 tablets by mouth Every 4 (Four) Hours As Needed (Pain). 8 tablet 0   • omeprazole-sodium bicarbonate (ZEGERID)  MG per capsule Take 1 capsule by mouth Every Night.     • ondansetron (ZOFRAN) 8 MG tablet Take 1 tablet by mouth Every 8 (Eight) Hours As Needed for Nausea or Vomiting. 30 tablet 2   • raNITIdine (ZANTAC) 150 MG tablet Take 150 mg by mouth Daily.     • Turmeric 500 MG tablet Take 1,000 mg by mouth Daily.       No current facility-administered medications on file prior to visit.         ALLERGIES:    Allergies   Allergen Reactions   • Moxifloxacin Anaphylaxis and Rash   • Aloxi [Palonosetron Hcl] Itching   • Latex Rash     Added based on information entered during log entry, please review and add reactions, type, and severity as needed        Social History     Socioeconomic History   • Marital status:      Spouse name: FLORINA Gross   • Number of children: 0   • Years of education: Graduate School   • Highest education level: Not on file   Occupational History   • Occupation: Physician     Employer: [employer]   Tobacco Use   • Smoking status: Never Smoker   •  "Smokeless tobacco: Never Used   Substance and Sexual Activity   • Alcohol use: Yes     Types: 1 Glasses of wine per week   • Drug use: No   • Sexual activity: Defer     Comment:         Family History   Problem Relation Age of Onset   • Colon cancer Mother 72   • Breast cancer Maternal Aunt 60   • Uterine cancer Maternal Aunt 75   • Stroke Father    • Malig Hyperthermia Neg Hx         Review of Systems   Constitutional: Positive for diaphoresis.   Gastrointestinal:        Chronic indigestion   Endocrine: Positive for heat intolerance.   Neurological: 1-st1stgstrstastdstest numbness.        Objective     Vitals:    01/25/19 0913   Height: 165 cm (64.96\")     Current Status 1/25/2019   ECOG score 0       Physical Exam    GENERAL:  Well-developed, well-nourished in no acute distress.   SKIN:  Warm, dry without rashes, purpura or petechiae.  EYES:  Pupils equal, round and reactive to light.  EOMs intact.  Conjunctivae normal. Mild opacity of the right eye.   EARS:  Hearing intact.  NOSE:  Septum midline.  No excoriations or nasal discharge.  MOUTH:  Tongue is well-papillated; no stomatitis or ulcers.  Lips normal.  THROAT:  Oropharynx without lesions or exudates.  NECK:  Supple with good range of motion; no thyromegaly or masses, no JVD.  LYMPHATICS:  No cervical, supraclavicular, axillary or inguinal adenopathy.  CHEST:  Lungs clear to auscultation. Good airflow.  CARDIAC:  Regular rate and rhythm without murmurs, rubs or gallops. Normal S1,S2.  ABDOMEN:  Soft, nontender with no hepatosplenomegaly or masses.  EXTREMITIES:  No clubbing, cyanosis or edema.  NEUROLOGICAL:  Cranial Nerves II-XII grossly intact.  No focal neurological deficits.  PSYCHIATRIC:  Normal affect and mood.        RECENT LABS:  Hematology WBC   Date Value Ref Range Status   01/25/2019 6.85 4.00 - 10.00 10*3/mm3 Final     RBC   Date Value Ref Range Status   01/25/2019 4.20 3.90 - 5.00 10*6/mm3 Final     Hemoglobin   Date Value Ref Range Status   01/25/2019 " 11.0 (L) 11.5 - 14.9 g/dL Final     Hematocrit   Date Value Ref Range Status   01/25/2019 35.6 34.0 - 45.0 % Final     Platelets   Date Value Ref Range Status   01/25/2019 403 (H) 150 - 375 10*3/mm3 Final        SYNOPTIC REPORT (Based on [facility] Cancer Case Summary, version January 2018):                Procedure: Total mastectomy.               Specimen/tumor laterality: Right.                Tumor site: Lower inner quadrant and central/inferior breast.                 Tumor size (greastest dimension of largest invasive focus): 1 cm (lower inner                       quadrant tumor).               Histologic type: Invasive carcinoma of no special type (ductal, not otherwise specified).                 Histologic grade (Rebeca Histologic Score):                              Glandular (acinar)/tubular differentiation: Score 3.                             Nuclear pleomorphism: Score 3.                             Mitotic rate: Score 2.                            Overall grade: Grade 3 (score 8 of 9).                            NOTE: The above Rebeca Histologic Score applies to the largest and                                    highest grade tumor (lower inner quadrant tumor).  The tumor in the                                    central/inferior breast is intermediate grade (tubular score 3, nuclear                                    score 2, mitotic score 1).               Tumor focality: Multiple foci of invasive carcinoma.                             Number of foci: 2.                              Sizes of individual foci: 1 cm (lower inner quadrant tumor) and approximately                                    0.8 cm (central/inferior tumor).               Ductal carcinoma in situ (DCIS): Present, positive for extensive intraductal component.                            Size (extent) of DCIS: Approximately greater than 6 cm (estimated based                                    on gross and microscopic findings).                             Architectural pattern: Solid and cribriform.                            Nuclear grade: Grade III (high).                            Necrosis: Central comedonecrosis.               Lobular carcinoma in situ (LCIS): No LCIS in specimen.               Margins:                             Invasive carcinoma margins: Uninvolved by invasive carcinoma.                                          Distance from closest margin: 2 mm (deep margin).                             DCIS margins: Positive for DCIS (inferior medial margin [lower inner quadrant]).                                          Additional DCIS margins: DCIS extends to within 2 mm of deep margin                                                 and to within 3 mm of inferior superficial soft tissue margin.  Atypical                                                 ductal hyperplasia is focally present at the deep margin.               Regional lymph nodes: Involved by tumor cells.                             Number of lymph nodes with macrometastases: 0.                            Number of lymph nodes with micrometastases: 0.                            Number of lymph nodes with isolated tumor cells: 1.                            Number of lymph nodes examined: 3.                             Number of sentinel lymph nodes examined: 3.                Treatment effect: No known presurgical therapy.                Lymphovascular invasion: Present.               Dermal lymphovascular invasion: Not identified.                Pathologic stage classification:                             TNM descriptors: m (multiple foci of invasive carcinoma).                             Primary tumor: pT1b.                            Regional lymph nodes: pN0(i+)(sn).                             Distant metastasis: Not applicable.                Additional pathologic findings:                              Ductal hyperplasia of the usual type.                               Fibroinflammatory changes consistent with sites of prior biopsy (2 prior biopsy                                    sites).                 Ancillary studies:ER UT negative HER-2 positive lower inner quadrant tumor           Assessment/Plan   1.  Multifocal right breast cancer T1 BN(ic)-isolated tumor cells medial tumor being grade 3 ER/UT negative HER-2 3+ lateral tumor being T1b N0 grade 1 ER/UT positive HER-2 2+ negative fish Oncotype score of 4  ·   Positive family history of multiple malignancies genetic testing negative  · Weekly Taxol Herceptin ×12 planned followed by aromatase inhibitor for her second tumor    She is here today for her 12th and final cycle of Taxol Herceptin and continues to tolerate quite well.  .    2.  Wound dehiscence no signs of infection resutured    3. Blurry vision.    Plan  1.  Taxol Herceptin #12 today same dose  2 Paxman cooling system  3. Follow up with opthalmology as scheduled. We reviewed signs and symptoms for which she needs to call the office such as diplopia, increased blurry vision, new neurologic side effects.  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  She does feel her blurry vision is related to cataracts.  4.  Echo to be performed on 2/17/2018 as already scheduled. She will then start Herceptin as already scheduled on 1/4/2018.  5. She will see Dr Treviño as already scheduled on 1/25/2018. She is scheduled for Herceptin this day.

## 2019-02-15 ENCOUNTER — APPOINTMENT (OUTPATIENT)
Dept: ONCOLOGY | Facility: HOSPITAL | Age: 64
End: 2019-02-15

## 2019-02-15 ENCOUNTER — INFUSION (OUTPATIENT)
Dept: ONCOLOGY | Facility: HOSPITAL | Age: 64
End: 2019-02-15

## 2019-02-15 VITALS
TEMPERATURE: 98.5 F | WEIGHT: 138.6 LBS | SYSTOLIC BLOOD PRESSURE: 143 MMHG | HEART RATE: 99 BPM | DIASTOLIC BLOOD PRESSURE: 80 MMHG | BODY MASS INDEX: 23.09 KG/M2

## 2019-02-15 DIAGNOSIS — Z17.1 MALIGNANT NEOPLASM OF OVERLAPPING SITES OF RIGHT BREAST IN FEMALE, ESTROGEN RECEPTOR NEGATIVE (HCC): Primary | ICD-10-CM

## 2019-02-15 DIAGNOSIS — C50.811 MALIGNANT NEOPLASM OF OVERLAPPING SITES OF RIGHT BREAST IN FEMALE, ESTROGEN RECEPTOR NEGATIVE (HCC): Primary | ICD-10-CM

## 2019-02-15 DIAGNOSIS — Z79.899 ENCOUNTER FOR LONG-TERM (CURRENT) USE OF HIGH-RISK MEDICATION: ICD-10-CM

## 2019-02-15 LAB
BASOPHILS # BLD AUTO: 0.04 10*3/MM3 (ref 0–0.2)
BASOPHILS NFR BLD AUTO: 0.7 % (ref 0–1.5)
DEPRECATED RDW RBC AUTO: 46.8 FL (ref 37–54)
EOSINOPHIL # BLD AUTO: 0.12 10*3/MM3 (ref 0–0.4)
EOSINOPHIL NFR BLD AUTO: 2 % (ref 0.3–6.2)
ERYTHROCYTE [DISTWIDTH] IN BLOOD BY AUTOMATED COUNT: 15.5 % (ref 12.3–15.4)
HCT VFR BLD AUTO: 35.8 % (ref 34–46.6)
HGB BLD-MCNC: 11 G/DL (ref 12–15.9)
IMM GRANULOCYTES # BLD AUTO: 0.02 10*3/MM3 (ref 0–0.05)
IMM GRANULOCYTES NFR BLD AUTO: 0.3 % (ref 0–0.5)
LYMPHOCYTES # BLD AUTO: 1.74 10*3/MM3 (ref 0.7–3.1)
LYMPHOCYTES NFR BLD AUTO: 29.1 % (ref 19.6–45.3)
MCH RBC QN AUTO: 25.6 PG (ref 26.6–33)
MCHC RBC AUTO-ENTMCNC: 30.7 G/DL (ref 31.5–35.7)
MCV RBC AUTO: 83.3 FL (ref 79–97)
MONOCYTES # BLD AUTO: 0.47 10*3/MM3 (ref 0.1–0.9)
MONOCYTES NFR BLD AUTO: 7.9 % (ref 5–12)
NEUTROPHILS # BLD AUTO: 3.59 10*3/MM3 (ref 1.4–7)
NEUTROPHILS NFR BLD AUTO: 60 % (ref 42.7–76)
NRBC BLD AUTO-RTO: 0 /100 WBC (ref 0–0)
PLATELET # BLD AUTO: 307 10*3/MM3 (ref 140–450)
PMV BLD AUTO: 8.6 FL (ref 6–12)
RBC # BLD AUTO: 4.3 10*6/MM3 (ref 3.77–5.28)
WBC NRBC COR # BLD: 5.98 10*3/MM3 (ref 3.4–10.8)

## 2019-02-15 PROCEDURE — 25010000002 TRASTUZUMAB PER 10 MG: Performed by: INTERNAL MEDICINE

## 2019-02-15 PROCEDURE — 96413 CHEMO IV INFUSION 1 HR: CPT | Performed by: INTERNAL MEDICINE

## 2019-02-15 PROCEDURE — 85025 COMPLETE CBC W/AUTO DIFF WBC: CPT | Performed by: INTERNAL MEDICINE

## 2019-02-15 RX ORDER — SODIUM CHLORIDE 0.9 % (FLUSH) 0.9 %
10 SYRINGE (ML) INJECTION AS NEEDED
Status: CANCELLED | OUTPATIENT
Start: 2019-02-15

## 2019-02-15 RX ORDER — DIPHENHYDRAMINE HCL 12.5MG/5ML
12.5 LIQUID (ML) ORAL ONCE
Status: COMPLETED | OUTPATIENT
Start: 2019-02-15 | End: 2019-02-15

## 2019-02-15 RX ORDER — SODIUM CHLORIDE 9 MG/ML
250 INJECTION, SOLUTION INTRAVENOUS ONCE
Status: COMPLETED | OUTPATIENT
Start: 2019-02-15 | End: 2019-02-15

## 2019-02-15 RX ORDER — SODIUM CHLORIDE 0.9 % (FLUSH) 0.9 %
10 SYRINGE (ML) INJECTION AS NEEDED
Status: DISCONTINUED | OUTPATIENT
Start: 2019-02-15 | End: 2019-02-15 | Stop reason: HOSPADM

## 2019-02-15 RX ADMIN — DIPHENHYDRAMINE HYDROCHLORIDE 12.5 MG: 25 SOLUTION ORAL at 08:58

## 2019-02-15 RX ADMIN — SODIUM CHLORIDE 250 ML: 9 INJECTION, SOLUTION INTRAVENOUS at 08:59

## 2019-02-15 RX ADMIN — Medication 500 UNITS: at 10:09

## 2019-02-15 RX ADMIN — TRASTUZUMAB 380 MG: 150 INJECTION, POWDER, LYOPHILIZED, FOR SOLUTION INTRAVENOUS at 09:32

## 2019-02-25 PROCEDURE — S0354 CANCER TREATMENT PLAN CHANGE: HCPCS | Performed by: INTERNAL MEDICINE

## 2019-03-07 ENCOUNTER — APPOINTMENT (OUTPATIENT)
Dept: PREADMISSION TESTING | Facility: HOSPITAL | Age: 64
End: 2019-03-07

## 2019-03-08 ENCOUNTER — LAB (OUTPATIENT)
Dept: LAB | Facility: HOSPITAL | Age: 64
End: 2019-03-08

## 2019-03-08 ENCOUNTER — INFUSION (OUTPATIENT)
Dept: ONCOLOGY | Facility: HOSPITAL | Age: 64
End: 2019-03-08

## 2019-03-08 VITALS
DIASTOLIC BLOOD PRESSURE: 83 MMHG | BODY MASS INDEX: 22.73 KG/M2 | SYSTOLIC BLOOD PRESSURE: 137 MMHG | TEMPERATURE: 98.3 F | OXYGEN SATURATION: 95 % | HEART RATE: 89 BPM | WEIGHT: 136.4 LBS

## 2019-03-08 DIAGNOSIS — C50.811 MALIGNANT NEOPLASM OF OVERLAPPING SITES OF RIGHT FEMALE BREAST, UNSPECIFIED ESTROGEN RECEPTOR STATUS (HCC): ICD-10-CM

## 2019-03-08 DIAGNOSIS — Z79.899 ENCOUNTER FOR LONG-TERM (CURRENT) USE OF HIGH-RISK MEDICATION: ICD-10-CM

## 2019-03-08 DIAGNOSIS — Z17.1 MALIGNANT NEOPLASM OF OVERLAPPING SITES OF RIGHT BREAST IN FEMALE, ESTROGEN RECEPTOR NEGATIVE (HCC): Primary | ICD-10-CM

## 2019-03-08 DIAGNOSIS — C50.811 MALIGNANT NEOPLASM OF OVERLAPPING SITES OF RIGHT BREAST IN FEMALE, ESTROGEN RECEPTOR NEGATIVE (HCC): Primary | ICD-10-CM

## 2019-03-08 DIAGNOSIS — Z17.1 MALIGNANT NEOPLASM OF OVERLAPPING SITES OF RIGHT BREAST IN FEMALE, ESTROGEN RECEPTOR NEGATIVE (HCC): ICD-10-CM

## 2019-03-08 DIAGNOSIS — C50.811 MALIGNANT NEOPLASM OF OVERLAPPING SITES OF RIGHT FEMALE BREAST, UNSPECIFIED ESTROGEN RECEPTOR STATUS (HCC): Primary | ICD-10-CM

## 2019-03-08 DIAGNOSIS — C50.811 MALIGNANT NEOPLASM OF OVERLAPPING SITES OF RIGHT BREAST IN FEMALE, ESTROGEN RECEPTOR NEGATIVE (HCC): ICD-10-CM

## 2019-03-08 LAB
ALBUMIN SERPL-MCNC: 4.3 G/DL (ref 3.5–5.2)
ALBUMIN/GLOB SERPL: 1.3 G/DL (ref 1.1–2.4)
ALP SERPL-CCNC: 89 U/L (ref 38–116)
ALT SERPL W P-5'-P-CCNC: 22 U/L (ref 0–33)
ANION GAP SERPL CALCULATED.3IONS-SCNC: 12.5 MMOL/L
AST SERPL-CCNC: 22 U/L (ref 0–32)
BASOPHILS # BLD AUTO: 0.06 10*3/MM3 (ref 0–0.2)
BASOPHILS NFR BLD AUTO: 1 % (ref 0–1.5)
BILIRUB SERPL-MCNC: 0.2 MG/DL (ref 0.1–1.2)
BUN BLD-MCNC: 19 MG/DL (ref 6–20)
BUN/CREAT SERPL: 25 (ref 7.3–30)
CALCIUM SPEC-SCNC: 9.8 MG/DL (ref 8.5–10.2)
CHLORIDE SERPL-SCNC: 98 MMOL/L (ref 98–107)
CO2 SERPL-SCNC: 29.5 MMOL/L (ref 22–29)
CREAT BLD-MCNC: 0.76 MG/DL (ref 0.6–1.1)
DEPRECATED RDW RBC AUTO: 44.4 FL (ref 37–54)
EOSINOPHIL # BLD AUTO: 0.14 10*3/MM3 (ref 0–0.4)
EOSINOPHIL NFR BLD AUTO: 2.4 % (ref 0.3–6.2)
ERYTHROCYTE [DISTWIDTH] IN BLOOD BY AUTOMATED COUNT: 15.3 % (ref 12.3–15.4)
GFR SERPL CREATININE-BSD FRML MDRD: 77 ML/MIN/1.73
GFR SERPL CREATININE-BSD FRML MDRD: 93 ML/MIN/1.73
GLOBULIN UR ELPH-MCNC: 3.2 GM/DL (ref 1.8–3.5)
GLUCOSE BLD-MCNC: 102 MG/DL (ref 74–124)
HCT VFR BLD AUTO: 36.8 % (ref 34–46.6)
HGB BLD-MCNC: 11.4 G/DL (ref 12–15.9)
IMM GRANULOCYTES # BLD AUTO: 0.02 10*3/MM3 (ref 0–0.05)
IMM GRANULOCYTES NFR BLD AUTO: 0.3 % (ref 0–0.5)
LYMPHOCYTES # BLD AUTO: 1.76 10*3/MM3 (ref 0.7–3.1)
LYMPHOCYTES NFR BLD AUTO: 30.3 % (ref 19.6–45.3)
MCH RBC QN AUTO: 24.9 PG (ref 26.6–33)
MCHC RBC AUTO-ENTMCNC: 31 G/DL (ref 31.5–35.7)
MCV RBC AUTO: 80.3 FL (ref 79–97)
MONOCYTES # BLD AUTO: 0.48 10*3/MM3 (ref 0.1–0.9)
MONOCYTES NFR BLD AUTO: 8.3 % (ref 5–12)
NEUTROPHILS # BLD AUTO: 3.34 10*3/MM3 (ref 1.4–7)
NEUTROPHILS NFR BLD AUTO: 57.7 % (ref 42.7–76)
NRBC BLD AUTO-RTO: 0 /100 WBC (ref 0–0)
PLATELET # BLD AUTO: 321 10*3/MM3 (ref 140–450)
PMV BLD AUTO: 8.8 FL (ref 6–12)
POTASSIUM BLD-SCNC: 3.6 MMOL/L (ref 3.5–4.7)
PROT SERPL-MCNC: 7.5 G/DL (ref 6.3–8)
RBC # BLD AUTO: 4.58 10*6/MM3 (ref 3.77–5.28)
SODIUM BLD-SCNC: 140 MMOL/L (ref 134–145)
WBC NRBC COR # BLD: 5.8 10*3/MM3 (ref 3.4–10.8)

## 2019-03-08 PROCEDURE — 96413 CHEMO IV INFUSION 1 HR: CPT | Performed by: INTERNAL MEDICINE

## 2019-03-08 PROCEDURE — 25010000002 TRASTUZUMAB PER 10 MG: Performed by: INTERNAL MEDICINE

## 2019-03-08 PROCEDURE — 85025 COMPLETE CBC W/AUTO DIFF WBC: CPT

## 2019-03-08 PROCEDURE — 80053 COMPREHEN METABOLIC PANEL: CPT

## 2019-03-08 RX ORDER — SODIUM CHLORIDE 9 MG/ML
250 INJECTION, SOLUTION INTRAVENOUS ONCE
Status: COMPLETED | OUTPATIENT
Start: 2019-03-08 | End: 2019-03-08

## 2019-03-08 RX ORDER — SODIUM CHLORIDE 0.9 % (FLUSH) 0.9 %
10 SYRINGE (ML) INJECTION AS NEEDED
Status: CANCELLED | OUTPATIENT
Start: 2019-03-08

## 2019-03-08 RX ORDER — SODIUM CHLORIDE 0.9 % (FLUSH) 0.9 %
10 SYRINGE (ML) INJECTION AS NEEDED
Status: DISCONTINUED | OUTPATIENT
Start: 2019-03-08 | End: 2019-03-08 | Stop reason: HOSPADM

## 2019-03-08 RX ADMIN — SODIUM CHLORIDE, PRESERVATIVE FREE 10 ML: 5 INJECTION INTRAVENOUS at 10:16

## 2019-03-08 RX ADMIN — SODIUM CHLORIDE 250 ML: 9 INJECTION, SOLUTION INTRAVENOUS at 09:42

## 2019-03-08 RX ADMIN — Medication 500 UNITS: at 10:16

## 2019-03-08 RX ADMIN — TRASTUZUMAB 380 MG: 150 INJECTION, POWDER, LYOPHILIZED, FOR SOLUTION INTRAVENOUS at 09:42

## 2019-03-15 ENCOUNTER — HOSPITAL ENCOUNTER (OUTPATIENT)
Dept: CARDIOLOGY | Facility: HOSPITAL | Age: 64
Discharge: HOME | End: 2019-03-15
Attending: INTERNAL MEDICINE | Admitting: INTERNAL MEDICINE

## 2019-03-15 ENCOUNTER — HOSPITAL ENCOUNTER (OUTPATIENT)
Dept: BONE DENSITY | Facility: HOSPITAL | Age: 64
Discharge: HOME | End: 2019-03-15
Attending: INTERNAL MEDICINE

## 2019-03-15 VITALS
SYSTOLIC BLOOD PRESSURE: 130 MMHG | WEIGHT: 136 LBS | DIASTOLIC BLOOD PRESSURE: 72 MMHG | OXYGEN SATURATION: 99 % | HEART RATE: 88 BPM | BODY MASS INDEX: 23.22 KG/M2 | HEIGHT: 64 IN

## 2019-03-15 DIAGNOSIS — Z17.1 MALIGNANT NEOPLASM OF OVERLAPPING SITES OF RIGHT BREAST IN FEMALE, ESTROGEN RECEPTOR NEGATIVE (HCC): ICD-10-CM

## 2019-03-15 DIAGNOSIS — C50.811 MALIGNANT NEOPLASM OF OVERLAPPING SITES OF RIGHT BREAST IN FEMALE, ESTROGEN RECEPTOR NEGATIVE (HCC): ICD-10-CM

## 2019-03-15 PROCEDURE — 93306 TTE W/DOPPLER COMPLETE: CPT

## 2019-03-15 PROCEDURE — 77080 DXA BONE DENSITY AXIAL: CPT

## 2019-03-15 PROCEDURE — 25010000002 PERFLUTREN (DEFINITY) 8.476 MG IN SODIUM CHLORIDE 0.9 % 10 ML INJECTION: Performed by: INTERNAL MEDICINE

## 2019-03-15 PROCEDURE — 93306 TTE W/DOPPLER COMPLETE: CPT | Performed by: INTERNAL MEDICINE

## 2019-03-15 RX ADMIN — PERFLUTREN 2 ML: 6.52 INJECTION, SUSPENSION INTRAVENOUS at 12:16

## 2019-03-18 LAB
ASCENDING AORTA: 3.3 CM
BH CV ECHO MEAS - ACS: 2 CM
BH CV ECHO MEAS - AO MAX PG (FULL): 1.7 MMHG
BH CV ECHO MEAS - AO MAX PG: 7.1 MMHG
BH CV ECHO MEAS - AO MEAN PG (FULL): 1.1 MMHG
BH CV ECHO MEAS - AO MEAN PG: 4 MMHG
BH CV ECHO MEAS - AO ROOT AREA (BSA CORRECTED): 1.8
BH CV ECHO MEAS - AO ROOT AREA: 7.3 CM^2
BH CV ECHO MEAS - AO ROOT DIAM: 3 CM
BH CV ECHO MEAS - AO V2 MAX: 132.8 CM/SEC
BH CV ECHO MEAS - AO V2 MEAN: 93.6 CM/SEC
BH CV ECHO MEAS - AO V2 VTI: 26.4 CM
BH CV ECHO MEAS - ASC AORTA: 3.3 CM
BH CV ECHO MEAS - AVA(I,A): 2.7 CM^2
BH CV ECHO MEAS - AVA(I,D): 2.7 CM^2
BH CV ECHO MEAS - AVA(V,A): 2.7 CM^2
BH CV ECHO MEAS - AVA(V,D): 2.7 CM^2
BH CV ECHO MEAS - BSA(HAYCOCK): 1.7 M^2
BH CV ECHO MEAS - BSA: 1.7 M^2
BH CV ECHO MEAS - BZI_BMI: 23.3 KILOGRAMS/M^2
BH CV ECHO MEAS - BZI_METRIC_HEIGHT: 162.6 CM
BH CV ECHO MEAS - BZI_METRIC_WEIGHT: 61.7 KG
BH CV ECHO MEAS - EDV(MOD-SP2): 67 ML
BH CV ECHO MEAS - EDV(MOD-SP4): 85 ML
BH CV ECHO MEAS - EDV(TEICH): 64.7 ML
BH CV ECHO MEAS - EF(CUBED): 69.5 %
BH CV ECHO MEAS - EF(MOD-BP): 67 %
BH CV ECHO MEAS - EF(MOD-SP2): 64.2 %
BH CV ECHO MEAS - EF(MOD-SP4): 69.4 %
BH CV ECHO MEAS - EF(TEICH): 61.8 %
BH CV ECHO MEAS - ESV(MOD-SP2): 24 ML
BH CV ECHO MEAS - ESV(MOD-SP4): 26 ML
BH CV ECHO MEAS - ESV(TEICH): 24.7 ML
BH CV ECHO MEAS - IVS/LVPW: 1.1
BH CV ECHO MEAS - IVSD: 1 CM
BH CV ECHO MEAS - LAT PEAK E' VEL: 9 CM/SEC
BH CV ECHO MEAS - LV DIASTOLIC VOL/BSA (35-75): 51.2 ML/M^2
BH CV ECHO MEAS - LV MASS(C)D: 116.8 GRAMS
BH CV ECHO MEAS - LV MASS(C)DI: 70.3 GRAMS/M^2
BH CV ECHO MEAS - LV MAX PG: 5.3 MMHG
BH CV ECHO MEAS - LV MEAN PG: 2.9 MMHG
BH CV ECHO MEAS - LV SYSTOLIC VOL/BSA (12-30): 15.7 ML/M^2
BH CV ECHO MEAS - LV V1 MAX: 115.4 CM/SEC
BH CV ECHO MEAS - LV V1 MEAN: 78.9 CM/SEC
BH CV ECHO MEAS - LV V1 VTI: 22.8 CM
BH CV ECHO MEAS - LVIDD: 3.9 CM
BH CV ECHO MEAS - LVIDS: 2.6 CM
BH CV ECHO MEAS - LVLD AP2: 7 CM
BH CV ECHO MEAS - LVLD AP4: 7.8 CM
BH CV ECHO MEAS - LVLS AP2: 5.5 CM
BH CV ECHO MEAS - LVLS AP4: 6 CM
BH CV ECHO MEAS - LVOT AREA (M): 3.1 CM^2
BH CV ECHO MEAS - LVOT AREA: 3.1 CM^2
BH CV ECHO MEAS - LVOT DIAM: 2 CM
BH CV ECHO MEAS - LVPWD: 0.91 CM
BH CV ECHO MEAS - MED PEAK E' VEL: 9 CM/SEC
BH CV ECHO MEAS - MV A DUR: 0.1 SEC
BH CV ECHO MEAS - MV A MAX VEL: 93 CM/SEC
BH CV ECHO MEAS - MV DEC SLOPE: 637.3 CM/SEC^2
BH CV ECHO MEAS - MV DEC TIME: 0.14 SEC
BH CV ECHO MEAS - MV E MAX VEL: 70.6 CM/SEC
BH CV ECHO MEAS - MV E/A: 0.76
BH CV ECHO MEAS - MV MAX PG: 3.8 MMHG
BH CV ECHO MEAS - MV MEAN PG: 2.1 MMHG
BH CV ECHO MEAS - MV P1/2T MAX VEL: 78.5 CM/SEC
BH CV ECHO MEAS - MV P1/2T: 36.1 MSEC
BH CV ECHO MEAS - MV V2 MAX: 97 CM/SEC
BH CV ECHO MEAS - MV V2 MEAN: 70 CM/SEC
BH CV ECHO MEAS - MV V2 VTI: 20 CM
BH CV ECHO MEAS - MVA P1/2T LCG: 2.8 CM^2
BH CV ECHO MEAS - MVA(P1/2T): 6.1 CM^2
BH CV ECHO MEAS - MVA(VTI): 3.6 CM^2
BH CV ECHO MEAS - PA ACC TIME: 0.08 SEC
BH CV ECHO MEAS - PA MAX PG (FULL): 0.46 MMHG
BH CV ECHO MEAS - PA MAX PG: 2.7 MMHG
BH CV ECHO MEAS - PA PR(ACCEL): 42.6 MMHG
BH CV ECHO MEAS - PA V2 MAX: 82.8 CM/SEC
BH CV ECHO MEAS - PULM A REVS DUR: 0.1 SEC
BH CV ECHO MEAS - PULM A REVS VEL: 32.5 CM/SEC
BH CV ECHO MEAS - PULM DIAS VEL: 34.6 CM/SEC
BH CV ECHO MEAS - PULM S/D: 1.7
BH CV ECHO MEAS - PULM SYS VEL: 58.8 CM/SEC
BH CV ECHO MEAS - RAP SYSTOLE: 3 MMHG
BH CV ECHO MEAS - RV MAX PG: 2.3 MMHG
BH CV ECHO MEAS - RV MEAN PG: 1.6 MMHG
BH CV ECHO MEAS - RV V1 MAX: 75.5 CM/SEC
BH CV ECHO MEAS - RV V1 MEAN: 61.8 CM/SEC
BH CV ECHO MEAS - RV V1 VTI: 16.1 CM
BH CV ECHO MEAS - RVSP: 7 MMHG
BH CV ECHO MEAS - SI(AO): 115.3 ML/M^2
BH CV ECHO MEAS - SI(CUBED): 24.2 ML/M^2
BH CV ECHO MEAS - SI(LVOT): 43.1 ML/M^2
BH CV ECHO MEAS - SI(MOD-SP2): 25.9 ML/M^2
BH CV ECHO MEAS - SI(MOD-SP4): 35.5 ML/M^2
BH CV ECHO MEAS - SI(TEICH): 24.1 ML/M^2
BH CV ECHO MEAS - SUP REN AO DIAM: 2.4 CM
BH CV ECHO MEAS - SV(AO): 191.4 ML
BH CV ECHO MEAS - SV(CUBED): 40.3 ML
BH CV ECHO MEAS - SV(LVOT): 71.6 ML
BH CV ECHO MEAS - SV(MOD-SP2): 43 ML
BH CV ECHO MEAS - SV(MOD-SP4): 59 ML
BH CV ECHO MEAS - SV(TEICH): 40 ML
BH CV ECHO MEAS - TAPSE (>1.6): 3 CM2
BH CV ECHO MEAS - TR MAX VEL: 105.7 CM/SEC
BH CV ECHO MEASUREMENTS AVERAGE E/E' RATIO: 7.84
BH CV XLRA - RV BASE: 2.3 CM
BH CV XLRA - TDI S': 16 CM/SEC
LEFT ATRIUM VOLUME INDEX: 13 ML/M2
LV EF 2D ECHO EST: 67 %
MAXIMAL PREDICTED HEART RATE: 158 BPM
SINUS: 3.2 CM
STJ: 2.6 CM
STRESS TARGET HR: 134 BPM

## 2019-03-22 ENCOUNTER — HOSPITAL ENCOUNTER (OUTPATIENT)
Facility: HOSPITAL | Age: 64
Setting detail: HOSPITAL OUTPATIENT SURGERY
Discharge: HOME | End: 2019-03-22
Attending: PLASTIC SURGERY | Admitting: PLASTIC SURGERY

## 2019-03-22 ENCOUNTER — ANESTHESIA EVENT (OUTPATIENT)
Dept: PERIOP | Facility: HOSPITAL | Age: 64
End: 2019-03-22

## 2019-03-22 ENCOUNTER — ANESTHESIA (OUTPATIENT)
Dept: PERIOP | Facility: HOSPITAL | Age: 64
End: 2019-03-22

## 2019-03-22 VITALS
WEIGHT: 134.04 LBS | HEART RATE: 84 BPM | OXYGEN SATURATION: 97 % | DIASTOLIC BLOOD PRESSURE: 88 MMHG | BODY MASS INDEX: 23.01 KG/M2 | RESPIRATION RATE: 16 BRPM | TEMPERATURE: 98.8 F | SYSTOLIC BLOOD PRESSURE: 138 MMHG

## 2019-03-22 DIAGNOSIS — Z85.3 HISTORY OF BREAST CANCER: ICD-10-CM

## 2019-03-22 PROCEDURE — C1789 PROSTHESIS, BREAST, IMP: HCPCS | Performed by: PLASTIC SURGERY

## 2019-03-22 PROCEDURE — 25010000002 ONDANSETRON PER 1 MG: Performed by: NURSE ANESTHETIST, CERTIFIED REGISTERED

## 2019-03-22 PROCEDURE — 25010000003 CEFAZOLIN IN DEXTROSE 2-4 GM/100ML-% SOLUTION: Performed by: PLASTIC SURGERY

## 2019-03-22 PROCEDURE — 25010000002 FENTANYL CITRATE (PF) 100 MCG/2ML SOLUTION: Performed by: NURSE ANESTHETIST, CERTIFIED REGISTERED

## 2019-03-22 PROCEDURE — 88342 IMHCHEM/IMCYTCHM 1ST ANTB: CPT | Performed by: PLASTIC SURGERY

## 2019-03-22 PROCEDURE — 25010000002 PROPOFOL 10 MG/ML EMULSION: Performed by: NURSE ANESTHETIST, CERTIFIED REGISTERED

## 2019-03-22 PROCEDURE — 25010000002 DEXAMETHASONE PER 1 MG: Performed by: NURSE ANESTHETIST, CERTIFIED REGISTERED

## 2019-03-22 PROCEDURE — 88304 TISSUE EXAM BY PATHOLOGIST: CPT | Performed by: PLASTIC SURGERY

## 2019-03-22 PROCEDURE — 88331 PATH CONSLTJ SURG 1 BLK 1SPC: CPT | Performed by: PATHOLOGY

## 2019-03-22 DEVICE — IMPLANTABLE DEVICE: Type: IMPLANTABLE DEVICE | Status: FUNCTIONAL

## 2019-03-22 RX ORDER — PROMETHAZINE HYDROCHLORIDE 25 MG/ML
12.5 INJECTION, SOLUTION INTRAMUSCULAR; INTRAVENOUS ONCE AS NEEDED
Status: DISCONTINUED | OUTPATIENT
Start: 2019-03-22 | End: 2019-03-22 | Stop reason: HOSPADM

## 2019-03-22 RX ORDER — PROMETHAZINE HYDROCHLORIDE 25 MG/1
25 TABLET ORAL ONCE AS NEEDED
Status: DISCONTINUED | OUTPATIENT
Start: 2019-03-22 | End: 2019-03-22 | Stop reason: HOSPADM

## 2019-03-22 RX ORDER — HYDROMORPHONE HYDROCHLORIDE 1 MG/ML
0.5 INJECTION, SOLUTION INTRAMUSCULAR; INTRAVENOUS; SUBCUTANEOUS
Status: DISCONTINUED | OUTPATIENT
Start: 2019-03-22 | End: 2019-03-22 | Stop reason: HOSPADM

## 2019-03-22 RX ORDER — LIDOCAINE HYDROCHLORIDE 10 MG/ML
0.5 INJECTION, SOLUTION EPIDURAL; INFILTRATION; INTRACAUDAL; PERINEURAL ONCE AS NEEDED
Status: DISCONTINUED | OUTPATIENT
Start: 2019-03-22 | End: 2019-03-22 | Stop reason: HOSPADM

## 2019-03-22 RX ORDER — HYDRALAZINE HYDROCHLORIDE 20 MG/ML
5 INJECTION INTRAMUSCULAR; INTRAVENOUS
Status: DISCONTINUED | OUTPATIENT
Start: 2019-03-22 | End: 2019-03-22 | Stop reason: HOSPADM

## 2019-03-22 RX ORDER — FENTANYL CITRATE 50 UG/ML
50 INJECTION, SOLUTION INTRAMUSCULAR; INTRAVENOUS
Status: CANCELLED | OUTPATIENT
Start: 2019-03-22

## 2019-03-22 RX ORDER — OXYCODONE AND ACETAMINOPHEN 7.5; 325 MG/1; MG/1
1 TABLET ORAL ONCE AS NEEDED
Status: DISCONTINUED | OUTPATIENT
Start: 2019-03-22 | End: 2019-03-22 | Stop reason: HOSPADM

## 2019-03-22 RX ORDER — ACETAMINOPHEN 325 MG/1
650 TABLET ORAL ONCE AS NEEDED
Status: DISCONTINUED | OUTPATIENT
Start: 2019-03-22 | End: 2019-03-22 | Stop reason: HOSPADM

## 2019-03-22 RX ORDER — LABETALOL HYDROCHLORIDE 5 MG/ML
5 INJECTION, SOLUTION INTRAVENOUS
Status: DISCONTINUED | OUTPATIENT
Start: 2019-03-22 | End: 2019-03-22 | Stop reason: HOSPADM

## 2019-03-22 RX ORDER — ACETAMINOPHEN 325 MG/1
650 TABLET ORAL ONCE AS NEEDED
Status: CANCELLED | OUTPATIENT
Start: 2019-03-22

## 2019-03-22 RX ORDER — LIDOCAINE HYDROCHLORIDE 20 MG/ML
INJECTION, SOLUTION INFILTRATION; PERINEURAL AS NEEDED
Status: DISCONTINUED | OUTPATIENT
Start: 2019-03-22 | End: 2019-03-22 | Stop reason: SURG

## 2019-03-22 RX ORDER — DIPHENHYDRAMINE HCL 25 MG
25 CAPSULE ORAL
Status: DISCONTINUED | OUTPATIENT
Start: 2019-03-22 | End: 2019-03-22 | Stop reason: HOSPADM

## 2019-03-22 RX ORDER — HYDRALAZINE HYDROCHLORIDE 20 MG/ML
5 INJECTION INTRAMUSCULAR; INTRAVENOUS
Status: CANCELLED | OUTPATIENT
Start: 2019-03-22

## 2019-03-22 RX ORDER — NALOXONE HCL 0.4 MG/ML
0.2 VIAL (ML) INJECTION AS NEEDED
Status: DISCONTINUED | OUTPATIENT
Start: 2019-03-22 | End: 2019-03-22 | Stop reason: HOSPADM

## 2019-03-22 RX ORDER — FAMOTIDINE 10 MG/ML
20 INJECTION, SOLUTION INTRAVENOUS ONCE
Status: COMPLETED | OUTPATIENT
Start: 2019-03-22 | End: 2019-03-22

## 2019-03-22 RX ORDER — FENTANYL CITRATE 50 UG/ML
INJECTION, SOLUTION INTRAMUSCULAR; INTRAVENOUS AS NEEDED
Status: DISCONTINUED | OUTPATIENT
Start: 2019-03-22 | End: 2019-03-22 | Stop reason: SURG

## 2019-03-22 RX ORDER — SODIUM CHLORIDE, SODIUM LACTATE, POTASSIUM CHLORIDE, CALCIUM CHLORIDE 600; 310; 30; 20 MG/100ML; MG/100ML; MG/100ML; MG/100ML
9 INJECTION, SOLUTION INTRAVENOUS CONTINUOUS
Status: DISCONTINUED | OUTPATIENT
Start: 2019-03-22 | End: 2019-03-22 | Stop reason: HOSPADM

## 2019-03-22 RX ORDER — PROMETHAZINE HYDROCHLORIDE 25 MG/1
25 TABLET ORAL ONCE AS NEEDED
Status: CANCELLED | OUTPATIENT
Start: 2019-03-22

## 2019-03-22 RX ORDER — EPHEDRINE SULFATE 50 MG/ML
5 INJECTION, SOLUTION INTRAVENOUS ONCE AS NEEDED
Status: DISCONTINUED | OUTPATIENT
Start: 2019-03-22 | End: 2019-03-22 | Stop reason: HOSPADM

## 2019-03-22 RX ORDER — EPHEDRINE SULFATE 50 MG/ML
5 INJECTION, SOLUTION INTRAVENOUS ONCE AS NEEDED
Status: CANCELLED | OUTPATIENT
Start: 2019-03-22

## 2019-03-22 RX ORDER — PROMETHAZINE HYDROCHLORIDE 25 MG/1
25 SUPPOSITORY RECTAL ONCE AS NEEDED
Status: CANCELLED | OUTPATIENT
Start: 2019-03-22

## 2019-03-22 RX ORDER — PROMETHAZINE HYDROCHLORIDE 25 MG/ML
12.5 INJECTION, SOLUTION INTRAMUSCULAR; INTRAVENOUS ONCE AS NEEDED
Status: CANCELLED | OUTPATIENT
Start: 2019-03-22

## 2019-03-22 RX ORDER — MIDAZOLAM HYDROCHLORIDE 1 MG/ML
2 INJECTION INTRAMUSCULAR; INTRAVENOUS
Status: DISCONTINUED | OUTPATIENT
Start: 2019-03-22 | End: 2019-03-22 | Stop reason: HOSPADM

## 2019-03-22 RX ORDER — CEFAZOLIN SODIUM 2 G/100ML
2 INJECTION, SOLUTION INTRAVENOUS EVERY 8 HOURS
Status: COMPLETED | OUTPATIENT
Start: 2019-03-22 | End: 2019-03-22

## 2019-03-22 RX ORDER — HYDROCODONE BITARTRATE AND ACETAMINOPHEN 7.5; 325 MG/1; MG/1
1 TABLET ORAL ONCE AS NEEDED
Status: DISCONTINUED | OUTPATIENT
Start: 2019-03-22 | End: 2019-03-22 | Stop reason: HOSPADM

## 2019-03-22 RX ORDER — ONDANSETRON 2 MG/ML
4 INJECTION INTRAMUSCULAR; INTRAVENOUS ONCE AS NEEDED
Status: CANCELLED | OUTPATIENT
Start: 2019-03-22

## 2019-03-22 RX ORDER — SODIUM CHLORIDE 0.9 % (FLUSH) 0.9 %
1-10 SYRINGE (ML) INJECTION AS NEEDED
Status: DISCONTINUED | OUTPATIENT
Start: 2019-03-22 | End: 2019-03-22 | Stop reason: HOSPADM

## 2019-03-22 RX ORDER — DEXAMETHASONE SODIUM PHOSPHATE 10 MG/ML
INJECTION INTRAMUSCULAR; INTRAVENOUS AS NEEDED
Status: DISCONTINUED | OUTPATIENT
Start: 2019-03-22 | End: 2019-03-22 | Stop reason: SURG

## 2019-03-22 RX ORDER — HYDROCODONE BITARTRATE AND ACETAMINOPHEN 7.5; 325 MG/1; MG/1
1 TABLET ORAL ONCE AS NEEDED
Status: CANCELLED | OUTPATIENT
Start: 2019-03-22

## 2019-03-22 RX ORDER — FENTANYL CITRATE 50 UG/ML
50 INJECTION, SOLUTION INTRAMUSCULAR; INTRAVENOUS
Status: DISCONTINUED | OUTPATIENT
Start: 2019-03-22 | End: 2019-03-22 | Stop reason: HOSPADM

## 2019-03-22 RX ORDER — MIDAZOLAM HYDROCHLORIDE 1 MG/ML
1 INJECTION INTRAMUSCULAR; INTRAVENOUS
Status: DISCONTINUED | OUTPATIENT
Start: 2019-03-22 | End: 2019-03-22 | Stop reason: HOSPADM

## 2019-03-22 RX ORDER — PROPOFOL 10 MG/ML
VIAL (ML) INTRAVENOUS AS NEEDED
Status: DISCONTINUED | OUTPATIENT
Start: 2019-03-22 | End: 2019-03-22 | Stop reason: SURG

## 2019-03-22 RX ORDER — ONDANSETRON 2 MG/ML
4 INJECTION INTRAMUSCULAR; INTRAVENOUS ONCE AS NEEDED
Status: DISCONTINUED | OUTPATIENT
Start: 2019-03-22 | End: 2019-03-22 | Stop reason: HOSPADM

## 2019-03-22 RX ORDER — OXYCODONE AND ACETAMINOPHEN 7.5; 325 MG/1; MG/1
1 TABLET ORAL ONCE AS NEEDED
Status: CANCELLED | OUTPATIENT
Start: 2019-03-22

## 2019-03-22 RX ORDER — SCOLOPAMINE TRANSDERMAL SYSTEM 1 MG/1
1 PATCH, EXTENDED RELEASE TRANSDERMAL ONCE
Status: DISCONTINUED | OUTPATIENT
Start: 2019-03-22 | End: 2019-03-22 | Stop reason: HOSPADM

## 2019-03-22 RX ORDER — DIPHENHYDRAMINE HYDROCHLORIDE 50 MG/ML
12.5 INJECTION INTRAMUSCULAR; INTRAVENOUS
Status: DISCONTINUED | OUTPATIENT
Start: 2019-03-22 | End: 2019-03-22 | Stop reason: HOSPADM

## 2019-03-22 RX ORDER — FLUMAZENIL 0.1 MG/ML
0.2 INJECTION INTRAVENOUS AS NEEDED
Status: DISCONTINUED | OUTPATIENT
Start: 2019-03-22 | End: 2019-03-22 | Stop reason: HOSPADM

## 2019-03-22 RX ORDER — HYDROMORPHONE HYDROCHLORIDE 1 MG/ML
0.5 INJECTION, SOLUTION INTRAMUSCULAR; INTRAVENOUS; SUBCUTANEOUS
Status: CANCELLED | OUTPATIENT
Start: 2019-03-22

## 2019-03-22 RX ORDER — DIPHENHYDRAMINE HCL 25 MG
25 CAPSULE ORAL
Status: CANCELLED | OUTPATIENT
Start: 2019-03-22

## 2019-03-22 RX ORDER — ACETAMINOPHEN 650 MG/1
650 SUPPOSITORY RECTAL ONCE AS NEEDED
Status: DISCONTINUED | OUTPATIENT
Start: 2019-03-22 | End: 2019-03-22 | Stop reason: HOSPADM

## 2019-03-22 RX ORDER — ACETAMINOPHEN 650 MG/1
650 SUPPOSITORY RECTAL ONCE AS NEEDED
Status: CANCELLED | OUTPATIENT
Start: 2019-03-22

## 2019-03-22 RX ORDER — FLUMAZENIL 0.1 MG/ML
0.2 INJECTION INTRAVENOUS AS NEEDED
Status: CANCELLED | OUTPATIENT
Start: 2019-03-22

## 2019-03-22 RX ORDER — LABETALOL HYDROCHLORIDE 5 MG/ML
5 INJECTION, SOLUTION INTRAVENOUS
Status: CANCELLED | OUTPATIENT
Start: 2019-03-22

## 2019-03-22 RX ORDER — PROMETHAZINE HYDROCHLORIDE 25 MG/1
25 SUPPOSITORY RECTAL ONCE AS NEEDED
Status: DISCONTINUED | OUTPATIENT
Start: 2019-03-22 | End: 2019-03-22 | Stop reason: HOSPADM

## 2019-03-22 RX ORDER — ONDANSETRON 2 MG/ML
INJECTION INTRAMUSCULAR; INTRAVENOUS AS NEEDED
Status: DISCONTINUED | OUTPATIENT
Start: 2019-03-22 | End: 2019-03-22 | Stop reason: SURG

## 2019-03-22 RX ORDER — DIPHENHYDRAMINE HYDROCHLORIDE 50 MG/ML
12.5 INJECTION INTRAMUSCULAR; INTRAVENOUS
Status: CANCELLED | OUTPATIENT
Start: 2019-03-22

## 2019-03-22 RX ORDER — NALOXONE HCL 0.4 MG/ML
0.2 VIAL (ML) INJECTION AS NEEDED
Status: CANCELLED | OUTPATIENT
Start: 2019-03-22

## 2019-03-22 RX ADMIN — DEXAMETHASONE SODIUM PHOSPHATE 8 MG: 10 INJECTION INTRAMUSCULAR; INTRAVENOUS at 10:25

## 2019-03-22 RX ADMIN — PROPOFOL 150 MG: 10 INJECTION, EMULSION INTRAVENOUS at 10:22

## 2019-03-22 RX ADMIN — SODIUM CHLORIDE, POTASSIUM CHLORIDE, SODIUM LACTATE AND CALCIUM CHLORIDE 9 ML/HR: 600; 310; 30; 20 INJECTION, SOLUTION INTRAVENOUS at 08:46

## 2019-03-22 RX ADMIN — FAMOTIDINE 20 MG: 10 INJECTION, SOLUTION INTRAVENOUS at 08:46

## 2019-03-22 RX ADMIN — FENTANYL CITRATE 50 MCG: 50 INJECTION INTRAMUSCULAR; INTRAVENOUS at 10:19

## 2019-03-22 RX ADMIN — FENTANYL CITRATE 50 MCG: 50 INJECTION INTRAMUSCULAR; INTRAVENOUS at 12:31

## 2019-03-22 RX ADMIN — SCOPALAMINE 1 PATCH: 1 PATCH, EXTENDED RELEASE TRANSDERMAL at 09:26

## 2019-03-22 RX ADMIN — CEFAZOLIN SODIUM 2 G: 2 INJECTION, SOLUTION INTRAVENOUS at 10:19

## 2019-03-22 RX ADMIN — ONDANSETRON 4 MG: 2 INJECTION INTRAMUSCULAR; INTRAVENOUS at 12:20

## 2019-03-22 RX ADMIN — LIDOCAINE HYDROCHLORIDE 75 MG: 20 INJECTION, SOLUTION INFILTRATION; PERINEURAL at 10:22

## 2019-03-22 NOTE — ANESTHESIA PREPROCEDURE EVALUATION
Anesthesia Evaluation     Patient summary reviewed and Nursing notes reviewed   NPO Solid Status: > 8 hours  NPO Liquid Status: > 2 hours           Airway   Mallampati: II  TM distance: >3 FB  Neck ROM: full  Dental - normal exam     Pulmonary - negative pulmonary ROS and normal exam   Cardiovascular - normal exam    (+) hypertension,       Neuro/Psych- negative ROS  GI/Hepatic/Renal/Endo    (+)  GERD,      Musculoskeletal (-) negative ROS    Abdominal    Substance History - negative use     OB/GYN negative ob/gyn ROS         Other                        Anesthesia Plan    ASA 2     general     Anesthetic plan, all risks, benefits, and alternatives have been provided, discussed and informed consent has been obtained with: patient.

## 2019-03-22 NOTE — ANESTHESIA POSTPROCEDURE EVALUATION
Patient: Carly Mendoza    Procedure Summary     Date:  03/22/19 Room / Location:   [facility]  [facility]    Anesthesia Start:  1013 Anesthesia Stop:  1240    Procedure:  BILATERAL BREAST TISSUE EXPANDER REMOVAL INSERTION OF BILATERAL BREAST IMPLANT (Bilateral Breast) Diagnosis:      Surgeon:  Waterhouse, Maurine, MD Provider:  Rahul Urena MD    Anesthesia Type:  general ASA Status:  2          Anesthesia Type: general  Last vitals  BP   146/86 (03/22/19 1330)   Temp   37.1 °C (98.8 °F) (03/22/19 1315)   Pulse   85 (03/22/19 1330)   Resp   16 (03/22/19 1330)     SpO2   97 % (03/22/19 1330)     Post Anesthesia Care and Evaluation    Patient location during evaluation: bedside  Patient participation: complete - patient participated  Level of consciousness: awake  Pain score: 2  Pain management: adequate  Airway patency: patent  Anesthetic complications: No anesthetic complications  PONV Status: none  Cardiovascular status: acceptable  Respiratory status: acceptable  Hydration status: acceptable    Comments: /86   Pulse 85   Temp 37.1 °C (98.8 °F) (Temporal)   Resp 16   Wt 60.8 kg (134 lb 0.6 oz)   SpO2 97%   BMI 23.01 kg/m²

## 2019-03-22 NOTE — H&P
Patient Care Team:  Provider, No Known as PCP - General  Cecy Treviño MD as Consulting Physician (Hematology and Oncology)  Kwaku Gudino MD as Referring Physician (Breast Surgery)    Chief complaint bilateral tissue expanders    Subjective     Patient is a 63 y.o. female presents with expanders[       Review of Systems   Pertinent items are noted in HPI, all other systems reviewed and negative    History  Past Medical History:   Diagnosis Date   • Environmental allergies    • GERD (gastroesophageal reflux disease)    • History of foreign travel     [place]   • Hypertension    • Malignant neoplasm of overlapping sites of right breast in female, estrogen receptor positive (CMS/HCC) 7/18/2018     Past Surgical History:   Procedure Laterality Date   • BREAST BIOPSY Right 2018    Malignant   • BREAST RECONSTRUCTION Bilateral 8/1/2018    Procedure: BILATERAL BREAST TISSUE EXPANDER INSERTION WITH ALLODERM;  Surgeon: Waterhouse, Maurine, MD;  Location: [facility] MAIN OR;  Service: Plastics   • COLONOSCOPY N/A 3/24/2017    Procedure: COLONOSCOPY TO CECUM AND TI;  Surgeon: Aleksander Bella MD;  Location: [facility] ENDOSCOPY;  Service:    • ENDOSCOPY N/A 3/24/2017    Procedure: ESOPHAGOGASTRODUODENOSCOPY;  Surgeon: Aleksander Bella MD;  Location: [facility] ENDOSCOPY;  Service:    • HYSTERECTOMY  1989   • MASTECTOMY W/ SENTINEL NODE BIOPSY Bilateral 8/1/2018    Procedure: BILATERAL TOTAL MASTECTOMY WITH RIGHT SENTINEL LYMPH MARTINEZ BIOPSY;  Surgeon: Kwaku Gudino MD;  Location: [facility] OR;  Service: General   • OOPHORECTOMY Bilateral 1989   • VENOUS ACCESS DEVICE (PORT) INSERTION Left 9/14/2018    Procedure: INSERTION VENOUS ACCESS DEVICE;  Surgeon: Kwaku Gudino MD;  Location: [facility] OR;  Service: General     Medications Prior to Admission   Medication Sig Dispense Refill Last Dose   • chlorthalidone (HYGROTON) 25 MG tablet Take 25 mg by mouth Every Night.   Past Month at Unknown time   •  fexofenadine-pseudoephedrine (ALLEGRA-D 24) 180-240 MG per 24 hr tablet Take 1 tablet by mouth Daily.   3/21/2019 at Unknown time   • anastrozole (ARIMIDEX) 1 MG tablet Take 1 tablet by mouth Daily. 90 tablet 1 3/15/2019   • celecoxib (CeleBREX) 100 MG capsule Take 100 mg by mouth Daily.   Taking   • eszopiclone (LUNESTA) 3 MG tablet Take 1 tablet by mouth every night at bedtime. 30 tablet 0 More than a month at Unknown time   • omeprazole-sodium bicarbonate (ZEGERID)  MG per capsule Take 1 capsule by mouth Every Night.   Taking   • ondansetron (ZOFRAN) 8 MG tablet Take 1 tablet by mouth Every 8 (Eight) Hours As Needed for Nausea or Vomiting. 30 tablet 2 Taking   • raNITIdine (ZANTAC) 150 MG tablet Take 150 mg by mouth Daily.   Taking   • Turmeric 500 MG tablet Take 1,000 mg by mouth Daily.   3/1/2019     Allergies:  Moxifloxacin; Aloxi [palonosetron hcl]; and Latex    Objective     Vital Signs  Temp:  [97.6 °F (36.4 °C)] 97.6 °F (36.4 °C)  Heart Rate:  [79] 79  Resp:  [14] 14  BP: (144)/(86) 144/86    Physical Exam:      General Appearance:    Alert, cooperative, in no acute distress   Head:    Normocephalic, without obvious abnormality, atraumatic   Eyes:            Lids and lashes normal, conjunctivae and sclerae normal, no   icterus, no pallor, corneas clear, PERRLA   Ears:    Ears appear intact with no abnormalities noted   Throat:   No oral lesions, no thrush, oral mucosa moist   Neck:   No adenopathy, supple, trachea midline, no thyromegaly, no     carotid bruit, no JVD   Back:     No kyphosis present, no scoliosis present, no skin lesions,       erythema or scars, no tenderness to percussion or                   palpation,   range of motion normal   Lungs:     Clear to auscultation,respirations regular, even and                   unlabored    Heart:    Regular rhythm and normal rate, normal S1 and S2, no            murmur, no gallop, no rub, no click   Breast Exam:    Bilateral expanders, irregular  healing right breast   Abdomen:     Normal bowel sounds, no masses, no organomegaly, soft        non-tender, non-distended, no guarding, no rebound                 tenderness   Genitalia:    Deferred   Extremities:   Moves all extremities well, no edema, no cyanosis, no              redness   Pulses:   Pulses palpable and equal bilaterally   Skin:   No bleeding, bruising or rash   Lymph nodes:   No palpable adenopathy   Neurologic:   Cranial nerves 2 - 12 grossly intact, sensation intact, DTR        present and equal bilaterally              Assessment/Plan       * No active hospital problems. *      Bilateral tissue expanders-replacement with implants    I discussed the patients findings and my recommendations with patient.     Maurine Waterhouse, MD  03/22/19  9:10 AM

## 2019-03-22 NOTE — BRIEF OP NOTE
BREAST TISSUE EXPANDER REMOVAL INSERTION OF IMPLANT  Progress Note    Carly Mendoza  3/22/2019    Pre-op Diagnosis:   Bilateral expanders       Post-Op Diagnosis Codes:  same    Procedure/CPT® Codes:      Procedure(s):  BILATERAL BREAST TISSUE EXPANDER REMOVAL INSERTION OF BILATERAL BREAST IMPLANT    Surgeon(s):  Waterhouse, Maurine, MD    Anesthesia: General    Staff:   Circulator: Ap Schreiber RN; Francia Sheridan RN  Scrub Person: Mindy Quiñones    Estimated Blood Loss: scant    Urine Voided: * No values recorded between 3/22/2019 12:00 AM and 3/22/2019 10:11 AM *    Specimens:                Right breast scar          Drains:  none    Findings:routine  Complications:none    Maurine Waterhouse, MD     Date: 3/22/2019  Time: 10:11 AM

## 2019-03-22 NOTE — OP NOTE
PREOPERATIVE DIAGNOSIS: Bilateral breast tissue expanders with irregular wound, right central breast.    POSTOPERATIVE DIAGNOSIS: Bilateral breast tissue expanders with irregular wound, right central breast.    PROCEDURES PERFORMED:   1.  Right breast tissue expander removal with placement of permanent implant using a Natrelle Inspira Cohesive silicon gel implant, style SCX, size 445 mL.  2.  Left breast expander removal with capsule release and placement of Natrelle Inspira Cohesive silicone gel implant, SCX style, size 445 mL.    ATTENDING SURGEON: Maurine Waterhouse, MD    ANESTHESIA: General endotracheal anesthesia, supplemented with local injection using 0.25% Marcaine with epinephrine.    INDICATIONS: The patient is status post bilateral mastectomies for a right breast cancer done in 10/2018. She underwent successful tissue expander placement; however, has had a slow and irregular healing wound on the right side where she had the mastectomy done with removal of the nipple areola. This has healed; however, left several spitting sutures and a small open area. This has been revised in the office multiple times. This area will now be excised for a frozen section and pathology in order to determine no additional abnormalities. Silicone gel implant in a size range from 420-470 mL are available.     DESCRIPTION OF PROCEDURE: The patient was marked preoperatively in a standing position to tono the planned skin excision on the right central scar incorporating the previous mastectomy scar, and using the left lower inframammary fold scar on the left side. Kefzol was administered preoperatively. The patient was then brought to the operating room where she was placed under general endotracheal anesthesia. The chest was prepped and draped out. The right side was treated first, injecting the planned incision with 0.25% Marcaine with epinephrine. A small skin ellipse was excised and then the incision extended along the  previous scar in order to open the pocket. The tissue appeared healthy on either edge and she had excellent take of the AlloDerm. The AlloDerm capsule was incised centrally and an intact expander removed. Pocket was injected with 0.25% Marcaine with epinephrine in the periphery extending from approximately 10 o'clock around to 4 o'clock. Capsule was then released from the chest wall with cautery and a lighted breast retractor. Some radial scoring was done medially through the AlloDerm. The defect was then irrigated with bacitracin irrigation. A Natrelle, style SCX, size 445 mL implant was chosen and placed into the pocket. Skin was closed temporarily with outer suture and the patient sat upright. Overall size and shape now appeared excellent with no significant tension on the wound.    The left side was then treated injected the lower inframammary fold scar with local anesthetic and then incising this, opening the capsule and removing an intact expander. She had excellent take of the AlloDerm. Initially, due to a slightly larger skin envelope and greater volume with the nipple areola remaining in place, a smaller implant was chosen to put into this pocket. An SCX Natrelle size 420 mL implant was placed, and the patient sat upright. Unfortunately, this appeared slightly smaller than the base width on the right and, therefore, she was returned to the supine position and this implant was removed. The capsule was scored to improve overall expansion and shape and a Natrelle SCX style, size 445 mL implant was also placed on the left side. The incision was closed temporarily and the patient was sat upright. Overall size and symmetry now appeared appropriate. She was returned to a supine position. All incisions were then closed with multiple layers of 2-0 Vicryl capsule suture followed by a 3-0 Vicryl dermal stitch and a 5-0 Vicryl subcuticular skin stitch. Benzoin and Steri-Strips were placed across the incision followed  by Steri-Strips. Patient was sat upright and wrapped with Kerlix and Ace wrap.    The excised right breat tissue skin scar was sent for permanent section. Frozen section demonstrated inflammation with a small chronic abscess and no evidence of cancer. No cultures were taken. Blood loss was scant. The patient tolerated the procedure well and was discharged to recovery room in stable condition.

## 2019-03-25 LAB
CYTO UR: NORMAL
LAB AP CASE REPORT: NORMAL
LAB AP SPECIAL STAINS: NORMAL
Lab: NORMAL
PATH REPORT.FINAL DX SPEC: NORMAL
PATH REPORT.GROSS SPEC: NORMAL

## 2019-03-26 DIAGNOSIS — Z17.1 MALIGNANT NEOPLASM OF OVERLAPPING SITES OF RIGHT BREAST IN FEMALE, ESTROGEN RECEPTOR NEGATIVE (HCC): ICD-10-CM

## 2019-03-26 DIAGNOSIS — C50.811 MALIGNANT NEOPLASM OF OVERLAPPING SITES OF RIGHT BREAST IN FEMALE, ESTROGEN RECEPTOR NEGATIVE (HCC): ICD-10-CM

## 2019-03-29 ENCOUNTER — INFUSION (OUTPATIENT)
Dept: ONCOLOGY | Facility: HOSPITAL | Age: 64
End: 2019-03-29

## 2019-03-29 ENCOUNTER — OFFICE VISIT (OUTPATIENT)
Dept: ONCOLOGY | Facility: CLINIC | Age: 64
End: 2019-03-29

## 2019-03-29 VITALS
BODY MASS INDEX: 22.69 KG/M2 | RESPIRATION RATE: 18 BRPM | SYSTOLIC BLOOD PRESSURE: 124 MMHG | DIASTOLIC BLOOD PRESSURE: 80 MMHG | OXYGEN SATURATION: 98 % | HEART RATE: 92 BPM | WEIGHT: 136.2 LBS | TEMPERATURE: 97.8 F | HEIGHT: 65 IN

## 2019-03-29 DIAGNOSIS — Z17.1 MALIGNANT NEOPLASM OF OVERLAPPING SITES OF RIGHT BREAST IN FEMALE, ESTROGEN RECEPTOR NEGATIVE (HCC): ICD-10-CM

## 2019-03-29 DIAGNOSIS — C50.811 MALIGNANT NEOPLASM OF OVERLAPPING SITES OF RIGHT BREAST IN FEMALE, ESTROGEN RECEPTOR POSITIVE (HCC): Primary | ICD-10-CM

## 2019-03-29 DIAGNOSIS — C50.811 MALIGNANT NEOPLASM OF OVERLAPPING SITES OF RIGHT BREAST IN FEMALE, ESTROGEN RECEPTOR NEGATIVE (HCC): Primary | ICD-10-CM

## 2019-03-29 DIAGNOSIS — Z17.1 MALIGNANT NEOPLASM OF OVERLAPPING SITES OF RIGHT BREAST IN FEMALE, ESTROGEN RECEPTOR NEGATIVE (HCC): Primary | ICD-10-CM

## 2019-03-29 DIAGNOSIS — C50.811 MALIGNANT NEOPLASM OF OVERLAPPING SITES OF RIGHT BREAST IN FEMALE, ESTROGEN RECEPTOR NEGATIVE (HCC): ICD-10-CM

## 2019-03-29 DIAGNOSIS — Z17.0 MALIGNANT NEOPLASM OF OVERLAPPING SITES OF RIGHT BREAST IN FEMALE, ESTROGEN RECEPTOR POSITIVE (HCC): Primary | ICD-10-CM

## 2019-03-29 DIAGNOSIS — Z45.2 FITTING AND ADJUSTMENT OF VASCULAR CATHETER: ICD-10-CM

## 2019-03-29 DIAGNOSIS — Z79.899 ENCOUNTER FOR LONG-TERM (CURRENT) USE OF HIGH-RISK MEDICATION: ICD-10-CM

## 2019-03-29 LAB
ALBUMIN SERPL-MCNC: 4.3 G/DL (ref 3.5–5.2)
ALBUMIN/GLOB SERPL: 1.5 G/DL (ref 1.1–2.4)
ALP SERPL-CCNC: 89 U/L (ref 38–116)
ALT SERPL W P-5'-P-CCNC: 20 U/L (ref 0–33)
ANION GAP SERPL CALCULATED.3IONS-SCNC: 12.4 MMOL/L
AST SERPL-CCNC: 21 U/L (ref 0–32)
BASOPHILS # BLD AUTO: 0.03 10*3/MM3 (ref 0–0.2)
BASOPHILS NFR BLD AUTO: 0.5 % (ref 0–1.5)
BILIRUB SERPL-MCNC: 0.2 MG/DL (ref 0.2–1.2)
BUN BLD-MCNC: 17 MG/DL (ref 6–20)
BUN/CREAT SERPL: 22.4 (ref 7.3–30)
CALCIUM SPEC-SCNC: 9.6 MG/DL (ref 8.5–10.2)
CHLORIDE SERPL-SCNC: 100 MMOL/L (ref 98–107)
CO2 SERPL-SCNC: 28.6 MMOL/L (ref 22–29)
CREAT BLD-MCNC: 0.76 MG/DL (ref 0.6–1.1)
DEPRECATED RDW RBC AUTO: 44.5 FL (ref 37–54)
EOSINOPHIL # BLD AUTO: 0.13 10*3/MM3 (ref 0–0.4)
EOSINOPHIL NFR BLD AUTO: 2.4 % (ref 0.3–6.2)
ERYTHROCYTE [DISTWIDTH] IN BLOOD BY AUTOMATED COUNT: 15.6 % (ref 12.3–15.4)
GFR SERPL CREATININE-BSD FRML MDRD: 77 ML/MIN/1.73
GFR SERPL CREATININE-BSD FRML MDRD: 93 ML/MIN/1.73
GLOBULIN UR ELPH-MCNC: 2.9 GM/DL (ref 1.8–3.5)
GLUCOSE BLD-MCNC: 135 MG/DL (ref 74–124)
HCT VFR BLD AUTO: 36.7 % (ref 34–46.6)
HGB BLD-MCNC: 11.3 G/DL (ref 12–15.9)
IMM GRANULOCYTES # BLD AUTO: 0.02 10*3/MM3 (ref 0–0.05)
IMM GRANULOCYTES NFR BLD AUTO: 0.4 % (ref 0–0.5)
LYMPHOCYTES # BLD AUTO: 1.66 10*3/MM3 (ref 0.7–3.1)
LYMPHOCYTES NFR BLD AUTO: 30.3 % (ref 19.6–45.3)
MCH RBC QN AUTO: 24.4 PG (ref 26.6–33)
MCHC RBC AUTO-ENTMCNC: 30.8 G/DL (ref 31.5–35.7)
MCV RBC AUTO: 79.1 FL (ref 79–97)
MONOCYTES # BLD AUTO: 0.42 10*3/MM3 (ref 0.1–0.9)
MONOCYTES NFR BLD AUTO: 7.7 % (ref 5–12)
NEUTROPHILS # BLD AUTO: 3.22 10*3/MM3 (ref 1.4–7)
NEUTROPHILS NFR BLD AUTO: 58.7 % (ref 42.7–76)
NRBC BLD AUTO-RTO: 0 /100 WBC (ref 0–0)
PLATELET # BLD AUTO: 318 10*3/MM3 (ref 140–450)
PMV BLD AUTO: 8.8 FL (ref 6–12)
POTASSIUM BLD-SCNC: 3.6 MMOL/L (ref 3.5–4.7)
PROT SERPL-MCNC: 7.2 G/DL (ref 6.3–8)
RBC # BLD AUTO: 4.64 10*6/MM3 (ref 3.77–5.28)
SODIUM BLD-SCNC: 141 MMOL/L (ref 134–145)
WBC NRBC COR # BLD: 5.48 10*3/MM3 (ref 3.4–10.8)

## 2019-03-29 PROCEDURE — 25010000002 TRASTUZUMAB PER 10 MG: Performed by: INTERNAL MEDICINE

## 2019-03-29 PROCEDURE — 99215 OFFICE O/P EST HI 40 MIN: CPT | Performed by: INTERNAL MEDICINE

## 2019-03-29 PROCEDURE — 96413 CHEMO IV INFUSION 1 HR: CPT | Performed by: INTERNAL MEDICINE

## 2019-03-29 PROCEDURE — 80053 COMPREHEN METABOLIC PANEL: CPT

## 2019-03-29 PROCEDURE — 85025 COMPLETE CBC W/AUTO DIFF WBC: CPT

## 2019-03-29 RX ORDER — SODIUM CHLORIDE 9 MG/ML
250 INJECTION, SOLUTION INTRAVENOUS ONCE
Status: COMPLETED | OUTPATIENT
Start: 2019-03-29 | End: 2019-03-29

## 2019-03-29 RX ORDER — EXEMESTANE 25 MG/1
25 TABLET ORAL DAILY
Qty: 90 TABLET | Refills: 3 | Status: SHIPPED | OUTPATIENT
Start: 2019-03-29 | End: 2019-06-27

## 2019-03-29 RX ORDER — SODIUM CHLORIDE 0.9 % (FLUSH) 0.9 %
10 SYRINGE (ML) INJECTION AS NEEDED
Status: CANCELLED | OUTPATIENT
Start: 2019-03-29

## 2019-03-29 RX ORDER — SODIUM CHLORIDE 9 MG/ML
250 INJECTION, SOLUTION INTRAVENOUS ONCE
Status: CANCELLED | OUTPATIENT
Start: 2019-04-19

## 2019-03-29 RX ORDER — SODIUM CHLORIDE 9 MG/ML
250 INJECTION, SOLUTION INTRAVENOUS ONCE
Status: CANCELLED | OUTPATIENT
Start: 2019-03-29

## 2019-03-29 RX ORDER — SODIUM CHLORIDE 9 MG/ML
250 INJECTION, SOLUTION INTRAVENOUS ONCE
Status: CANCELLED | OUTPATIENT
Start: 2019-05-10

## 2019-03-29 RX ADMIN — SODIUM CHLORIDE 250 ML: 900 INJECTION, SOLUTION INTRAVENOUS at 10:10

## 2019-03-29 RX ADMIN — TRASTUZUMAB 380 MG: 150 INJECTION, POWDER, LYOPHILIZED, FOR SOLUTION INTRAVENOUS at 10:42

## 2019-03-29 RX ADMIN — Medication 500 UNITS: at 11:15

## 2019-03-29 NOTE — PROGRESS NOTES
Subjective     REASON FOR CONSULTATION:    1.Multifocal right breast cancer post bilateral mastectomies- T1b N0+-10:00 tumor 8 mm grade 1 ER/PA positive HER-2 negative Oncotype 4  2.  5:00 tumor right breast ER/PA negative HER-2 positive 10 mm in size  3.  Negative genetic testing  4.  Adequate echo weekly Taxol Herceptin started on 9/21/18                          Referring physician Kwaku Gudino M.D.                            History of Present Illness : This is a 62-year-old lady with a multifocal right breast cancer T1b N0+ ER/PA negative HER-2 positive and the second T1b ER/PA positive HER-2 negative tumor here for maintenance Herceptin.  She was started on Arimidex  for her second tumor which was a low risk hormone positive tumor.  But after 2 months the pain in her hip and shin became unbearable and she had to stop and we discussed today trying Aromasin to see if this will help    She saw the orthopedic doctor who thought she needed her left hip replaced and she does not want to do this at the time    She had a new implants put in last week and the wounds are healed well and I think we will proceed with treatment today as her ejection fraction is stable at 67% on 3/15/2019    Her bone density was reviewed and shows normal bone density     She denies headaches, speech disturbances, photophobia, new neuropathy.     Her appetite is good. Her bowels and bladder are working well.     Past Medical History:   Diagnosis Date   • Environmental allergies    • GERD (gastroesophageal reflux disease)    • History of foreign travel     [place]   • Hypertension    • Malignant neoplasm of overlapping sites of right breast in female, estrogen receptor positive (CMS/HCC) 7/18/2018        Past Surgical History:   Procedure Laterality Date   • BREAST BIOPSY Right 2018    Malignant   • BREAST RECONSTRUCTION Bilateral 8/1/2018    Procedure: BILATERAL BREAST TISSUE EXPANDER INSERTION WITH ALLODERM;  Surgeon: Waterhouse,  MD Micheline;  Location: [facility] MAIN OR;  Service: Plastics   • BREAST TISSUE EXPANDER REMOVAL INSERTION OF IMPLANT Bilateral 3/22/2019    Procedure: BILATERAL BREAST TISSUE EXPANDER REMOVAL INSERTION OF BILATERAL BREAST IMPLANT;  Surgeon: Waterhouse, Maurine, MD;  Location: [facility] OR OSC;  Service: Plastics   • COLONOSCOPY N/A 3/24/2017    Procedure: COLONOSCOPY TO CECUM AND TI;  Surgeon: Aleksander Bella MD;  Location: [facility] ENDOSCOPY;  Service:    • ENDOSCOPY N/A 3/24/2017    Procedure: ESOPHAGOGASTRODUODENOSCOPY;  Surgeon: Aleksander Bella MD;  Location: [facility] ENDOSCOPY;  Service:    • HYSTERECTOMY  1989   • MASTECTOMY W/ SENTINEL NODE BIOPSY Bilateral 8/1/2018    Procedure: BILATERAL TOTAL MASTECTOMY WITH RIGHT SENTINEL LYMPH MARTINEZ BIOPSY;  Surgeon: Kwaku Gudino MD;  Location: [facility] MAIN OR;  Service: General   • OOPHORECTOMY Bilateral 1989   • VENOUS ACCESS DEVICE (PORT) INSERTION Left 9/14/2018    Procedure: INSERTION VENOUS ACCESS DEVICE;  Surgeon: Kwaku Gudino MD;  Location: 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  The patient had never had a previous mammogram and was referred for evaluation at which time she was found to have a 4.2 cm mass in the upper outer quadrant of the right breast at the 9:30 position associated with pleomorphic calcifications in the lower inner quadrant also felt to be suspicious left breast was benign.  Patient underwent ultrasound guided biopsy on 6/15/18 with the biopsy at the 9:30 position with clip placement showing invasive mammary carcinoma ductal type low-grade ER % ME 9800% HER-2 2+ negative by fish.  Patient also underwent biopsy at the 4:00 position which showed a microscopic focus of invasive ductal carcinoma felt to be probably a lymphatic space involvement and was too small to  do additional testing and a clip was placed.  Patient went on to have bilateral breast MRIs and was referred to Dr. Gudino MRIs confirmed 2 areas of abnormality at the breast-lower inner quadrant showed a large hematoma with a clip identified and there is extensive clumped abnormal enhancement beginning superior to the biopsy cavity and extending laterally to the entire upper half of the right breast into the upper outer quadrant measuring 1.2 x 1.4 cm and extending over 7 cm.  A biopsy clip was identified in this area the abnormal enhancement focally abutted the pectoralis muscle medially with no invasion through few borderline enlarged right axillary nodes with normal morphology no internal mammary adenopathy was noted left breast was benign.  Patient went on to have bilateral mastectomies the final path report showed 2 separate primaries in the breast associated with extensive high-grade DCIS measuring 6 cm  The lower inner quadrant lesion measured 1 cm was a grade 3 invasive ductal carcinoma ER 0 ND 0 HER-2 3+ and the lesion at the 9:30 position was a grade 1 invasive ductal carcinoma measuring 8 mm strongly ER/ND positive and HER-2 negative based on preop testing.  3 sentinel nodes were obtained 1 showed isolated tumor cells.  Margins showed  Positivity with DCIS in the inferior medial margin and was close to the deep margin (2mm) and inferior superficial margin(3mm).  Lymphovascular invasion was present.  Left breast is benign    Postoperatively she's done well and had her expanders filled and is here today to discuss adjuvant treatment.  Unfortunately she did not know about the testing on the second tumor at surgery and thought she had a grade 1 ER/ND positive HER-2 negative tumor and was very surprised when I told her the prior markers on the second tumor.    She is  0 para 0 menarche was at age 13 menopause at age 33 when she had a hysterectomy and oophorectomy.  She's been on hormone replacement  since age of 33 until her recent diagnosis .    Family history is positive for mother having colon cancer age 72 maternal aunt with breast cancer in her 60s and another maternal aunt with uterine cancer in her 70s.  She's had negative genetic testing but the extended panel is being done and is pending    We discussed treatment plans and I have scheduled her for Chemotherapy education with tentative plans for weekly Taxol Herceptin for 12 weeks followed by Herceptin for a year  We will do an Oncotype DX score on the hormone positive tumor which is low-grade unlikely to be high risk    We discussed whether or not radiation would be indicated and except for the fact that there is a positive margin with high-grade DCIS normally she would not need radiation but I recommended a consultation because of the margins and the high grade nature of the DCIS.    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  She is presented at the multidiscipline conference and everybody felt comfortable with weekly Taxol Herceptin for 12 weeks followed by Herceptin for a year and Oncotype testing on the second tumor for which she would receive an aromatase inhibitor for 5 years unless it was high risk which is very unlikely    She is here today having had an echo which showed an adequate ejection fraction  Of  61% and she had a port placed and is here to start treatment.    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    Unfortunately the wrong specimen was sent for Oncotype testing and ER negative specimen was sent and I talked to the pathologist and they will send the correct specimen for Oncotype DX testing with no talked to the patient.    That he has no questions and is happy to finally get started with therapy.    Genetic testing results are negative using the INVITAE  Panel    1/19  She is already having pain in her left knee and wants to see an orthopedic surgeon for this pain is pretty sharp and she is taking Celebrex to help deal with this    We discussed the side effects of Arimidex including hot flashes and joint pains but she is wanting to do this rather than tamoxifen because she does not like the side effect profile of tamoxifen    She's never had a bone density and we will order this also and make sure it is adequate    She has no significant neuropathy and she is happy to be off the Taxol-she tolerated the Herceptin alone well but wants to cut back on the Benadryl because it makes her too sleepy    She lost a lot of hair in the chronic for him because her [place] cooling Did not fit well on the vertex of her skull    She reports mild blurry vision that started approximately after the 10th dose of Taxol and her ophthalmologist as she has cataracts that are affecting the macular part of her iron she needs to have surgeries sometime in the next month.  I told her that Herceptin does not cause this and all the  steroid premeds may have accelerated to cataract          Current Outpatient Medications on File Prior to Visit   Medication Sig Dispense Refill   • celecoxib  (CeleBREX) 100 MG capsule Take 100 mg by mouth As Needed.     • chlorthalidone (HYGROTON) 25 MG tablet Take 25 mg by mouth Every Night.     • fexofenadine-pseudoephedrine (ALLEGRA-D 24) 180-240 MG per 24 hr tablet Take 1 tablet by mouth Daily.     • omeprazole-sodium bicarbonate (ZEGERID)  MG per capsule Take 1 capsule by mouth Every Night.     • raNITIdine (ZANTAC) 150 MG tablet Take 150 mg by mouth Daily.     • Turmeric 500 MG tablet Take 1,000 mg by mouth Daily.     • anastrozole (ARIMIDEX) 1 MG tablet Take 1 tablet by mouth Daily. 90 tablet 1   • eszopiclone (LUNESTA) 3 MG tablet Take 1 tablet by mouth every night at bedtime. 30 tablet 0   • ondansetron (ZOFRAN) 8 MG tablet Take 1 tablet by mouth Every 8 (Eight) Hours As Needed for Nausea or Vomiting. 30 tablet 2     No current facility-administered medications on file prior to visit.         ALLERGIES:    Allergies   Allergen Reactions   • Moxifloxacin Anaphylaxis and Rash   • Aloxi [Palonosetron Hcl] Itching   • Latex Rash     Added based on information entered during log entry, please review and add reactions, type, and severity as needed        Social History     Socioeconomic History   • Marital status:      Spouse name: FLORINA Gross   • Number of children: 0   • Years of education: Graduate School   • Highest education level: Not on file   Occupational History   • Occupation: Physician     Employer: [employer]   Tobacco Use   • Smoking status: Never Smoker   • Smokeless tobacco: Never Used   Substance and Sexual Activity   • Alcohol use: Yes     Types: 1 Glasses of wine per week   • Drug use: No   • Sexual activity: Defer     Comment:         Family History   Problem Relation Age of Onset   • Colon cancer Mother 72   • Breast cancer Maternal Aunt 60   • Uterine cancer Maternal Aunt 75   • Stroke Father    • Malig Hyperthermia Neg Hx         Review of Systems   Constitutional: Positive for diaphoresis.  "  Gastrointestinal:        Chronic indigestion   Endocrine: Positive for heat intolerance.   Neurological: 1-rd3rdgrdrrdarddrderd numbness.    Left knee pain 6/10 taking Celebrex    Objective     Vitals:    03/29/19 0839   BP: 124/80   Pulse: 92   Resp: 18   Temp: 97.8 °F (36.6 °C)   SpO2: 98%   Weight: 61.8 kg (136 lb 3.2 oz)   Height: 165 cm (64.96\")   PainSc: 0-No pain     Current Status 3/29/2019   ECOG score 0       Physical Exam    GENERAL:  Well-developed, well-nourished in no acute distress.   SKIN:  Warm, dry without rashes, purpura or petechiae.  EYES:  Pupils equal, round and reactive to light.  EOMs intact.  Conjunctivae normal. Mild opacity of the right eye.   EARS:  Hearing intact.  NOSE:  Septum midline.  No excoriations or nasal discharge.  MOUTH:  Tongue is well-papillated; no stomatitis or ulcers.  Lips normal.  THROAT:  Oropharynx without lesions or exudates.  NECK:  Supple with good range of motion; no thyromegaly or masses, no JVD.  LYMPHATICS:  No cervical, supraclavicular, axillary or inguinal adenopathy.  CHEST:  Lungs clear to auscultation. Good airflow.  BREATS: Left breast is benign the right breast has an expander in place incision looks clean  CARDIAC:  Regular rate and rhythm without murmurs, rubs or gallops. Normal S1,S2.  ABDOMEN:  Soft, nontender with no hepatosplenomegaly or masses.  EXTREMITIES:  No clubbing, cyanosis or edema.  No obvious fluid or warmth in the left knee  NEUROLOGICAL:  Cranial Nerves II-XII grossly intact.  No focal neurological deficits.  PSYCHIATRIC:  Normal affect and mood.        RECENT LABS:  Hematology WBC   Date Value Ref Range Status   03/29/2019 5.48 3.40 - 10.80 10*3/mm3 Final     RBC   Date Value Ref Range Status   03/29/2019 4.64 3.77 - 5.28 10*6/mm3 Final     Hemoglobin   Date Value Ref Range Status   03/29/2019 11.3 (L) 12.0 - 15.9 g/dL Final     Hematocrit   Date Value Ref Range Status   03/29/2019 36.7 34.0 - 46.6 % Final     Platelets   Date Value Ref Range " Status   03/29/2019 318 140 - 510 10*3/mm3 Final        SYNOPTIC REPORT (Based on [facility] Cancer Case Summary, version January 2018):                Procedure: Total mastectomy.               Specimen/tumor laterality: Right.                Tumor site: Lower inner quadrant and central/inferior breast.                 Tumor size (greastest dimension of largest invasive focus): 1 cm (lower inner                       quadrant tumor).               Histologic type: Invasive carcinoma of no special type (ductal, not otherwise specified).                 Histologic grade (Vienna Histologic Score):                              Glandular (acinar)/tubular differentiation: Score 3.                             Nuclear pleomorphism: Score 3.                             Mitotic rate: Score 2.                            Overall grade: Grade 3 (score 8 of 9).                            NOTE: The above Vienna Histologic Score applies to the largest and                                    highest grade tumor (lower inner quadrant tumor).  The tumor in the                                    central/inferior breast is intermediate grade (tubular score 3, nuclear                                    score 2, mitotic score 1).               Tumor focality: Multiple foci of invasive carcinoma.                             Number of foci: 2.                              Sizes of individual foci: 1 cm (lower inner quadrant tumor) and approximately                                    0.8 cm (central/inferior tumor).               Ductal carcinoma in situ (DCIS): Present, positive for extensive intraductal component.                            Size (extent) of DCIS: Approximately greater than 6 cm (estimated based                                    on gross and microscopic findings).                            Architectural pattern: Solid and cribriform.                            Nuclear grade: Grade III (high).                             Necrosis: Central comedonecrosis.               Lobular carcinoma in situ (LCIS): No LCIS in specimen.               Margins:                             Invasive carcinoma margins: Uninvolved by invasive carcinoma.                                          Distance from closest margin: 2 mm (deep margin).                             DCIS margins: Positive for DCIS (inferior medial margin [lower inner quadrant]).                                          Additional DCIS margins: DCIS extends to within 2 mm of deep margin                                                 and to within 3 mm of inferior superficial soft tissue margin.  Atypical                                                 ductal hyperplasia is focally present at the deep margin.               Regional lymph nodes: Involved by tumor cells.                             Number of lymph nodes with macrometastases: 0.                            Number of lymph nodes with micrometastases: 0.                            Number of lymph nodes with isolated tumor cells: 1.                            Number of lymph nodes examined: 3.                             Number of sentinel lymph nodes examined: 3.                Treatment effect: No known presurgical therapy.                Lymphovascular invasion: Present.               Dermal lymphovascular invasion: Not identified.                Pathologic stage classification:                             TNM descriptors: m (multiple foci of invasive carcinoma).                             Primary tumor: pT1b.                            Regional lymph nodes: pN0(i+)(sn).                             Distant metastasis: Not applicable.                Additional pathologic findings:                              Ductal hyperplasia of the usual type.                              Fibroinflammatory changes consistent with sites of prior biopsy (2 prior biopsy                                    sites).                 Ancillary  studies:ER NH negative HER-2 positive lower inner quadrant tumor    Echocardiogram  Reading physician: Keily Jensen MD Ordering physician: Cecy Treviño MD Study date: 12/17/18     Study Description     A two-dimensional transthoracic echocardiogram with color flow and Doppler was performed. The study is technically difficult for diagnosis.Verbal consent was obtained from the patient to use Definity contrast in order to optimize the study. The use of Definity was indicated as two or more contiguous segments of the left ventricular endocardial border were unable to be adequately visualized by standard imaging methods. 1.3 mL of mechanically activated Definity was mixed with 8.7 mL of normal saline. A total of 2 mL of the resulting Definity solution was administered, and the remaining contrast was wasted and discarded.   No adverse reaction to contrast was noted.   9/14/18 EF 61% No GLS     DEXA BONE DENSITY AXIAL  FINDINGS: Average lumbar bone mineral density 1.0 g/cm sq correlating to  a T value of -0.2 and a Z score of 1.4. Right femoral neck bone mineral  density 0.77 g/cm sq correlating to a T value of -0.7 and a Z score of  0.5. Left femoral neck bone mineral density 0.79 g/cm sq correlating to  a T value of -0.6 and a Z score of 0.6.     CONCLUSION: Normal bone mineral density.     This report was finalized on 3/18/2019      Echocardiogram  Study Description     A two-dimensional transthoracic echocardiogram with color flow and Doppler was performed.   The study is technically difficult for diagnosis. The quality of the study is limited due to poor acoustic windows related to breast implants. Verbal consent was obtained from the patient to use Definity contrast in order to optimize the study. Note also that contrast was used to enhance ejection fraction reproducability of the LV ejection fraction on serial echocardiograms.  The use of Definity was indicated as two or more contiguous segments of the left  ventricular endocardial border were unable to be adequately visualized by standard imaging methods. 1.3 mL of mechanically activated Definity was mixed with 8.7 mL of normal saline. A total of 2 mL of the resulting Definity solution was administered, and the remaining contrast was wasted and discarded.   No adverse reaction to contrast was noted.   No GLS obtained due to breast implants and spacers  12/17/18  EF=57%    09/14/18  EF=61%.   Electronically signed by Keily Jensen MD on 3/15/19 at 1713 EDT       FINDINGS: Average lumbar bone mineral density 1.0 g/cm sq correlating to  a T value of -0.2 and a Z score of 1.4. Right femoral neck bone mineral  density 0.77 g/cm sq correlating to a T value of -0.7 and a Z score of  0.5. Left femoral neck bone mineral density 0.79 g/cm sq correlating to  a T value of -0.6 and a Z score of 0.6.     CONCLUSION: Normal bone mineral density.          Assessment/Plan   1.  Multifocal right breast cancer T1 BN(ic)-isolated tumor cells medial tumor being grade 3 ER/GA negative HER-2 3+ lateral tumor being T1b N0 grade 1 ER/GA positive HER-2 2+ negative fish Oncotype score of 4  ·   Positive family history of multiple malignancies genetic testing negative  · Weekly Taxol Herceptin ×12 planned followed by aromatase inhibitor for her second tumor  · Arimidex started in 2/19-intolerant switch to Aromasin in 4/19    2.  Wound dehiscence no signs of infection resutured-expander removed in 3/19 with permanent implant placed    3. Blurry vision.  Cataract surgery in 3 weeks    4. Left knee pain referral to orthopedics planned -degenerative disease of the hip aggravated by Arimidex    Plan  1.  Herceptin # 5/13 maintenance dose today   2.  Aromasin 25 mg to start as adjuvant therapy for her second tumor   3.. Cataract surgery   4.  Return in 9 weeks to see me with Herceptin in 3 weeks in 6 weeks

## 2019-04-19 ENCOUNTER — INFUSION (OUTPATIENT)
Dept: ONCOLOGY | Facility: HOSPITAL | Age: 64
End: 2019-04-19

## 2019-04-19 VITALS
BODY MASS INDEX: 22.76 KG/M2 | WEIGHT: 136.6 LBS | DIASTOLIC BLOOD PRESSURE: 86 MMHG | SYSTOLIC BLOOD PRESSURE: 139 MMHG | HEART RATE: 91 BPM

## 2019-04-19 DIAGNOSIS — Z79.899 ENCOUNTER FOR LONG-TERM (CURRENT) USE OF HIGH-RISK MEDICATION: ICD-10-CM

## 2019-04-19 DIAGNOSIS — Z17.1 MALIGNANT NEOPLASM OF OVERLAPPING SITES OF RIGHT BREAST IN FEMALE, ESTROGEN RECEPTOR NEGATIVE (HCC): Primary | ICD-10-CM

## 2019-04-19 DIAGNOSIS — C50.811 MALIGNANT NEOPLASM OF OVERLAPPING SITES OF RIGHT BREAST IN FEMALE, ESTROGEN RECEPTOR NEGATIVE (HCC): Primary | ICD-10-CM

## 2019-04-19 LAB
BASOPHILS # BLD AUTO: 0.04 10*3/MM3 (ref 0–0.2)
BASOPHILS NFR BLD AUTO: 0.8 % (ref 0–1.5)
DEPRECATED RDW RBC AUTO: 46.4 FL (ref 37–54)
EOSINOPHIL # BLD AUTO: 0.17 10*3/MM3 (ref 0–0.4)
EOSINOPHIL NFR BLD AUTO: 3.4 % (ref 0.3–6.2)
ERYTHROCYTE [DISTWIDTH] IN BLOOD BY AUTOMATED COUNT: 16.4 % (ref 12.3–15.4)
HCT VFR BLD AUTO: 37 % (ref 34–46.6)
HGB BLD-MCNC: 11.5 G/DL (ref 12–15.9)
IMM GRANULOCYTES # BLD AUTO: 0.01 10*3/MM3 (ref 0–0.05)
IMM GRANULOCYTES NFR BLD AUTO: 0.2 % (ref 0–0.5)
LYMPHOCYTES # BLD AUTO: 1.41 10*3/MM3 (ref 0.7–3.1)
LYMPHOCYTES NFR BLD AUTO: 28.3 % (ref 19.6–45.3)
MCH RBC QN AUTO: 24.1 PG (ref 26.6–33)
MCHC RBC AUTO-ENTMCNC: 31.1 G/DL (ref 31.5–35.7)
MCV RBC AUTO: 77.6 FL (ref 79–97)
MONOCYTES # BLD AUTO: 0.51 10*3/MM3 (ref 0.1–0.9)
MONOCYTES NFR BLD AUTO: 10.2 % (ref 5–12)
NEUTROPHILS # BLD AUTO: 2.85 10*3/MM3 (ref 1.4–7)
NEUTROPHILS NFR BLD AUTO: 57.1 % (ref 42.7–76)
NRBC BLD AUTO-RTO: 0 /100 WBC (ref 0–0)
PLATELET # BLD AUTO: 313 10*3/MM3 (ref 140–450)
PMV BLD AUTO: 8.9 FL (ref 6–12)
RBC # BLD AUTO: 4.77 10*6/MM3 (ref 3.77–5.28)
WBC NRBC COR # BLD: 4.99 10*3/MM3 (ref 3.4–10.8)

## 2019-04-19 PROCEDURE — 85025 COMPLETE CBC W/AUTO DIFF WBC: CPT | Performed by: INTERNAL MEDICINE

## 2019-04-19 PROCEDURE — 96413 CHEMO IV INFUSION 1 HR: CPT | Performed by: INTERNAL MEDICINE

## 2019-04-19 PROCEDURE — 25010000002 TRASTUZUMAB PER 10 MG: Performed by: INTERNAL MEDICINE

## 2019-04-19 RX ORDER — SODIUM CHLORIDE 9 MG/ML
250 INJECTION, SOLUTION INTRAVENOUS ONCE
Status: COMPLETED | OUTPATIENT
Start: 2019-04-19 | End: 2019-04-19

## 2019-04-19 RX ORDER — SODIUM CHLORIDE 0.9 % (FLUSH) 0.9 %
10 SYRINGE (ML) INJECTION AS NEEDED
Status: CANCELLED | OUTPATIENT
Start: 2019-04-19

## 2019-04-19 RX ORDER — SODIUM CHLORIDE 0.9 % (FLUSH) 0.9 %
10 SYRINGE (ML) INJECTION AS NEEDED
Status: DISCONTINUED | OUTPATIENT
Start: 2019-04-19 | End: 2019-04-19 | Stop reason: HOSPADM

## 2019-04-19 RX ADMIN — TRASTUZUMAB 380 MG: 150 INJECTION, POWDER, LYOPHILIZED, FOR SOLUTION INTRAVENOUS at 10:03

## 2019-04-19 RX ADMIN — SODIUM CHLORIDE 250 ML: 900 INJECTION, SOLUTION INTRAVENOUS at 09:30

## 2019-04-19 RX ADMIN — SODIUM CHLORIDE, PRESERVATIVE FREE 500 UNITS: 5 INJECTION INTRAVENOUS at 10:36

## 2019-05-09 NOTE — PROGRESS NOTES
Subjective     REASON FOR CONSULTATION:    1.Multifocal right breast cancer post bilateral mastectomies- T1b N0+-10:00 tumor 8 mm grade 1 ER/MA positive HER-2 negative Oncotype 4  2.  5:00 tumor right breast ER/MA negative HER-2 positive 10 mm in size  3.  Negative genetic testing  4.  Adequate echo weekly Taxol Herceptin started on 9/21/18                          Referring physician Kwaku Gudino M.D.                            History of Present Illness : This is a 62-year-old lady with a multifocal right breast cancer T1b N0+ ER/MA negative HER-2 positive and the second T1b ER/MA positive HER-2 negative tumor here for maintenance Herceptin.  She was started on Arimidex  for her second tumor which was a low risk hormone positive tumor.  But after 2 months the pain in her hip and shin became unbearable and we switched to Aromasin which she has done very well overall    She saw the orthopedic doctor who thought she needed her left hip replaced and she does not want to do this at the time    She had a new implants put inand the wounds are healed well and I think we will proceed with treatment today      her ejection fraction is stable at 67% on 3/15/2019    Her bone density was reviewed and shows normal bone density     She denies headaches, speech disturbances, photophobia, new neuropathy.     Her appetite is good. Her bowels and bladder are working well.     She needs cataract surgery in the other eye and is going to wait till she finishes her Herceptin to start this    Past Medical History:   Diagnosis Date   • Environmental allergies    • GERD (gastroesophageal reflux disease)    • History of foreign travel     [place]   • Hypertension    • Malignant neoplasm of overlapping sites of right breast in female, estrogen receptor positive (CMS/HCC) 7/18/2018        Past Surgical History:   Procedure Laterality Date   • BREAST BIOPSY Right 2018    Malignant   • BREAST RECONSTRUCTION Bilateral 8/1/2018     Procedure: BILATERAL BREAST TISSUE EXPANDER INSERTION WITH ALLODERM;  Surgeon: Waterhouse, Maurine, MD;  Location[facility] MA[facility];  Service: Plastics   • BREAST TISSUE EXPANDER REMOVAL INSERTION OF IMPLANT Bilateral 3/22/2019    Procedure: BILATERAL BREAST TISSUE EXPANDER REMOVAL INSERTION OF BILATERAL BREAST IMPLANT;  Surgeon: Waterhouse, Maurine, MD;  Location:  [facility];  Service: Plastics   • COLONOSCOPY N/A 3/24/2017    Procedure: COLONOSCOPY TO CECUM AND TI;  Surgeon: Aleksander Bella MD;  Location: [facility] ENDOSCOPY;  Service:    • ENDOSCOPY N/A 3/24/2017    Procedure: ESOPHAGOGASTRODUODENOSCOPY;  Surgeon: Aleksander Bella MD;  Location: [facility] ENDOSCOPY;  Service:    • EYE SURGERY Right 05/01/2019    lens transplant   • HYSTERECTOMY  1989   • MASTECTOMY W/ SENTINEL NODE BIOPSY Bilateral 8/1/2018    Procedure: BILATERAL TOTAL MASTECTOMY WITH RIGHT SENTINEL LYMPH MARTINEZ BIOPSY;  Surgeon: Kwaku Gudino MD;  Location: [facility] MAIN OR;  Service: General   • OOPHORECTOMY Bilateral 1989   • VENOUS ACCESS DEVICE (PORT) INSERTION Left 9/14/2018    Procedure: INSERTION VENOUS ACCESS DEVICE;  Surgeon: Kwaku Gudino MD;  Location: [facility] MAIN OR;  Service: General      ONCOLOGIC HISTORY;  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  The patient had never had a previous mammogram and was referred for evaluation at which time she was found to have a 4.2 cm mass in the upper outer quadrant of the right breast at the 9:30 position associated with pleomorphic calcifications in the lower inner quadrant also felt to be suspicious left breast was benign.  Patient underwent ultrasound guided biopsy on 6/15/18 with the biopsy at the 9:30 position with clip placement showing invasive mammary carcinoma ductal type low-grade ER % MA 9800% HER-2 2+ negative by fish.  Patient also underwent biopsy at the 4:00  position which showed a microscopic focus of invasive ductal carcinoma felt to be probably a lymphatic space involvement and was too small to do additional testing and a clip was placed.  Patient went on to have bilateral breast MRIs and was referred to Dr. Gudino MRIs confirmed 2 areas of abnormality at the breast-lower inner quadrant showed a large hematoma with a clip identified and there is extensive clumped abnormal enhancement beginning superior to the biopsy cavity and extending laterally to the entire upper half of the right breast into the upper outer quadrant measuring 1.2 x 1.4 cm and extending over 7 cm.  A biopsy clip was identified in this area the abnormal enhancement focally abutted the pectoralis muscle medially with no invasion through few borderline enlarged right axillary nodes with normal morphology no internal mammary adenopathy was noted left breast was benign.  Patient went on to have bilateral mastectomies the final path report showed 2 separate primaries in the breast associated with extensive high-grade DCIS measuring 6 cm  The lower inner quadrant lesion measured 1 cm was a grade 3 invasive ductal carcinoma ER 0 MN 0 HER-2 3+ and the lesion at the 9:30 position was a grade 1 invasive ductal carcinoma measuring 8 mm strongly ER/MN positive and HER-2 negative based on preop testing.  3 sentinel nodes were obtained 1 showed isolated tumor cells.  Margins showed  Positivity with DCIS in the inferior medial margin and was close to the deep margin (2mm) and inferior superficial margin(3mm).  Lymphovascular invasion was present.  Left breast is benign    Postoperatively she's done well and had her expanders filled and is here today to discuss adjuvant treatment.  Unfortunately she did not know about the testing on the second tumor at surgery and thought she had a grade 1 ER/MN positive HER-2 negative tumor and was very surprised when I told her the prior markers on the second tumor.    She is   0 para 0 menarche was at age 13 menopause at age 33 when she had a hysterectomy and oophorectomy.  She's been on hormone replacement since age of 33 until her recent diagnosis .    Family history is positive for mother having colon cancer age 72 maternal aunt with breast cancer in her 60s and another maternal aunt with uterine cancer in her 70s.  She's had negative genetic testing but the extended panel is being done and is pending    We discussed treatment plans and I have scheduled her for Chemotherapy education with tentative plans for weekly Taxol Herceptin for 12 weeks followed by Herceptin for a year  We will do an Oncotype DX score on the hormone positive tumor which is low-grade unlikely to be high risk    We discussed whether or not radiation would be indicated and except for the fact that there is a positive margin with high-grade DCIS normally she would not need radiation but I recommended a consultation because of the margins and the high grade nature of the DCIS.    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  She is presented at the multidiscipline conference and everybody felt comfortable with weekly Taxol Herceptin for 12 weeks followed by Herceptin for a year and Oncotype testing on the second tumor for which she would receive an aromatase inhibitor for 5  years unless it was high risk which is very unlikely    She is here today having had an echo which showed an adequate ejection fraction  Of 61% and she had a port placed and is here to start treatment.    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    Unfortunately the wrong specimen was sent for Oncotype testing and ER negative specimen was sent and I talked to the pathologist and they will send the correct specimen for Oncotype DX testing with no talked to the patient.    That he has no questions and is happy to finally get started with therapy.    Genetic testing results are negative using the [employer]  Panel    1/19  She is already having pain in her left knee and wants to see an orthopedic surgeon for this pain is pretty sharp and she is taking Celebrex to help deal with this    We discussed the side effects of Arimidex including hot flashes and joint pains but she is wanting to do this rather than tamoxifen because she does not like the side effect profile of tamoxifen    She's never had a bone density and we will order this also and make sure it is adequate    She has no significant neuropathy and she is happy to be off the Taxol-she tolerated the Herceptin alone well but wants to cut back on the Benadryl because it makes her too sleepy    She lost a lot of hair in the chronic for him because her [place] cooling Did not fit well on the vertex of her skull    She reports mild blurry vision that started approximately after the 10th dose of Taxol and her ophthalmologist as she has cataracts that are affecting the macular part of her iron she needs to have surgeries sometime in the next month.  I told her that Herceptin does not cause this and all the  steroid premeds may  have accelerated to cataract          Current Outpatient Medications on File Prior to Visit   Medication Sig Dispense Refill   • chlorthalidone (HYGROTON) 25 MG tablet Take 25 mg by mouth Every Night.     • exemestane (AROMASIN) 25 MG chemo tablet Take 1 tablet by mouth Daily for 90 days. 90 tablet 3   • fexofenadine-pseudoephedrine (ALLEGRA-D 24) 180-240 MG per 24 hr tablet Take 1 tablet by mouth Daily.     • omeprazole-sodium bicarbonate (ZEGERID)  MG per capsule Take 1 capsule by mouth Every Night.     • raNITIdine (ZANTAC) 150 MG tablet Take 150 mg by mouth Daily.     • Turmeric 500 MG tablet Take 1,000 mg by mouth Daily.     • celecoxib (CeleBREX) 100 MG capsule Take 100 mg by mouth As Needed.       Current Facility-Administered Medications on File Prior to Visit   Medication Dose Route Frequency Provider Last Rate Last Dose   • heparin flush (porcine) 100 UNIT/ML injection 500 Units  500 Units Intravenous PRN Cecy Treviño MD   500 Units at 05/10/19 1157   • sodium chloride 0.9 % flush 10 mL  10 mL Intravenous PRN Cecy Treviño MD   10 mL at 05/10/19 1157   • [COMPLETED] sodium chloride 0.9 % infusion 250 mL  250 mL Intravenous Once Cecy Treviño MD   Stopped at 05/10/19 1157   • [COMPLETED] Trastuzumab (HERCEPTIN) 380 mg in sodium chloride 0.9 % 250 mL chemo IVPB  6 mg/kg (Treatment Plan Recorded) Intravenous Once Cecy Treviño MD   Stopped at 05/10/19 1154        ALLERGIES:    Allergies   Allergen Reactions   • Moxifloxacin Anaphylaxis and Rash   • Aloxi [Palonosetron Hcl] Itching   • Latex Rash     Added based on information entered during log entry, please review and add reactions, type, and severity as needed        Social History     Socioeconomic History   • Marital status:      Spouse name: FLORINA Gross   • Number of children: 0   • Years of education: Graduate School   • Highest education level: Not on file   Occupational History   • Occupation: Physician     Employer: [employer]   Tobacco Use   • Smoking status: Never Smoker   • Smokeless tobacco: Never Used   Substance and Sexual Activity   • Alcohol use: Yes     Types: 1 Glasses of wine per week   • Drug use: No   • Sexual activity: Defer     Comment:         Family History   Problem Relation Age of Onset   • Colon cancer Mother 72   • Breast cancer Maternal Aunt 60   • Uterine cancer Maternal Aunt 75   • Stroke Father    • Malig Hyperthermia Neg Hx         Review of Systems   Constitutional: Positive for diaphoresis.   Gastrointestinal:        Chronic indigestion   Endocrine: Positive for heat intolerance.   Neurological: 1-st1stgstrstastdstest numbness.    Left knee pain 6/10 taking Celebrex    Objective     Vitals:    05/10/19 1007   BP: 144/81   Pulse: 85   Resp: 16   Temp: 98.1 °F (36.7 °C)   SpO2: 97%   Weight: 61.2 kg (135 lb)   Height: 165 cm (64.96\")   PainSc: 0-No pain     Current Status 5/10/2019   ECOG score 0       Physical Exam    GENERAL:  Well-developed, well-nourished in no acute distress.   SKIN:  Warm, dry without rashes, purpura or petechiae.  EYES:  Pupils equal, round and reactive to light.  EOMs intact.  Conjunctivae normal. Mild opacity of the right eye.   EARS:  Hearing intact.  NOSE:  Septum midline.  No excoriations or nasal discharge.  MOUTH:  Tongue is well-papillated; no stomatitis or ulcers.  Lips normal.  THROAT:  Oropharynx without lesions or exudates.  NECK:  Supple with good range of motion; no thyromegaly or masses, no JVD.  LYMPHATICS:  No cervical, supraclavicular, axillary or inguinal adenopathy.  CHEST:  Lungs clear to auscultation. Good airflow.  BREATS: Left breast is benign with an implant the right breast has an implant in place incision looks clean  CARDIAC:  Regular rate and rhythm without murmurs, rubs or gallops. Normal S1,S2.  ABDOMEN:  Soft, nontender with no hepatosplenomegaly or masses.  EXTREMITIES:  No clubbing, cyanosis or edema.  No obvious fluid or warmth in the " left knee  NEUROLOGICAL:  Cranial Nerves II-XII grossly intact.  No focal neurological deficits.  PSYCHIATRIC:  Normal affect and mood.        RECENT LABS:  Hematology WBC   Date Value Ref Range Status   05/10/2019 5.67 3.40 - 10.80 10*3/mm3 Final     RBC   Date Value Ref Range Status   05/10/2019 4.87 3.77 - 5.28 10*6/mm3 Final     Hemoglobin   Date Value Ref Range Status   05/10/2019 11.7 (L) 12.0 - 15.9 g/dL Final     Hematocrit   Date Value Ref Range Status   05/10/2019 37.6 34.0 - 46.6 % Final     Platelets   Date Value Ref Range Status   05/10/2019 333 140 - 450 10*3/mm3 Final        SYNOPTIC REPORT (Based on [facility] Cancer Case Summary, version January 2018):                Procedure: Total mastectomy.               Specimen/tumor laterality: Right.                Tumor site: Lower inner quadrant and central/inferior breast.                 Tumor size (greastest dimension of largest invasive focus): 1 cm (lower inner                       quadrant tumor).               Histologic type: Invasive carcinoma of no special type (ductal, not otherwise specified).                 Histologic grade (Rebeca Histologic Score):                              Glandular (acinar)/tubular differentiation: Score 3.                             Nuclear pleomorphism: Score 3.                             Mitotic rate: Score 2.                            Overall grade: Grade 3 (score 8 of 9).                            NOTE: The above Rebeca Histologic Score applies to the largest and                                    highest grade tumor (lower inner quadrant tumor).  The tumor in the                                    central/inferior breast is intermediate grade (tubular score 3, nuclear                                    score 2, mitotic score 1).               Tumor focality: Multiple foci of invasive carcinoma.                             Number of foci: 2.                              Sizes of individual foci: 1 cm (lower  inner quadrant tumor) and approximately                                    0.8 cm (central/inferior tumor).               Ductal carcinoma in situ (DCIS): Present, positive for extensive intraductal component.                            Size (extent) of DCIS: Approximately greater than 6 cm (estimated based                                    on gross and microscopic findings).                            Architectural pattern: Solid and cribriform.                            Nuclear grade: Grade III (high).                            Necrosis: Central comedonecrosis.               Lobular carcinoma in situ (LCIS): No LCIS in specimen.               Margins:                             Invasive carcinoma margins: Uninvolved by invasive carcinoma.                                          Distance from closest margin: 2 mm (deep margin).                             DCIS margins: Positive for DCIS (inferior medial margin [lower inner quadrant]).                                          Additional DCIS margins: DCIS extends to within 2 mm of deep margin                                                 and to within 3 mm of inferior superficial soft tissue margin.  Atypical                                                 ductal hyperplasia is focally present at the deep margin.               Regional lymph nodes: Involved by tumor cells.                             Number of lymph nodes with macrometastases: 0.                            Number of lymph nodes with micrometastases: 0.                            Number of lymph nodes with isolated tumor cells: 1.                            Number of lymph nodes examined: 3.                             Number of sentinel lymph nodes examined: 3.                Treatment effect: No known presurgical therapy.                Lymphovascular invasion: Present.               Dermal lymphovascular invasion: Not identified.                Pathologic stage classification:                              TNM descriptors: m (multiple foci of invasive carcinoma).                             Primary tumor: pT1b.                            Regional lymph nodes: pN0(i+)(sn).                             Distant metastasis: Not applicable.                Additional pathologic findings:                              Ductal hyperplasia of the usual type.                              Fibroinflammatory changes consistent with sites of prior biopsy (2 prior biopsy                                    sites).                 Ancillary studies:ER CT negative HER-2 positive lower inner quadrant tumor    Echocardiogram  Reading physician: Keily Jensen MD Ordering physician: Cecy Treviño MD Study date: 12/17/18     Study Description     A two-dimensional transthoracic echocardiogram with color flow and Doppler was performed. The study is technically difficult for diagnosis.Verbal consent was obtained from the patient to use Definity contrast in order to optimize the study. The use of Definity was indicated as two or more contiguous segments of the left ventricular endocardial border were unable to be adequately visualized by standard imaging methods. 1.3 mL of mechanically activated Definity was mixed with 8.7 mL of normal saline. A total of 2 mL of the resulting Definity solution was administered, and the remaining contrast was wasted and discarded.   No adverse reaction to contrast was noted.   9/14/18 EF 61% No GLS     DEXA BONE DENSITY AXIAL  FINDINGS: Average lumbar bone mineral density 1.0 g/cm sq correlating to  a T value of -0.2 and a Z score of 1.4. Right femoral neck bone mineral  density 0.77 g/cm sq correlating to a T value of -0.7 and a Z score of  0.5. Left femoral neck bone mineral density 0.79 g/cm sq correlating to  a T value of -0.6 and a Z score of 0.6.     CONCLUSION: Normal bone mineral density.     This report was finalized on 3/18/2019      Echocardiogram  Study Description     A two-dimensional  transthoracic echocardiogram with color flow and Doppler was performed.   The study is technically difficult for diagnosis. The quality of the study is limited due to poor acoustic windows related to breast implants. Verbal consent was obtained from the patient to use Definity contrast in order to optimize the study. Note also that contrast was used to enhance ejection fraction reproducability of the LV ejection fraction on serial echocardiograms.  The use of Definity was indicated as two or more contiguous segments of the left ventricular endocardial border were unable to be adequately visualized by standard imaging methods. 1.3 mL of mechanically activated Definity was mixed with 8.7 mL of normal saline. A total of 2 mL of the resulting Definity solution was administered, and the remaining contrast was wasted and discarded.   No adverse reaction to contrast was noted.   No GLS obtained due to breast implants and spacers  12/17/18  EF=57%    09/14/18  EF=61%.   Electronically signed by Keily Jensen MD on 3/15/19 at 1713 EDT       FINDINGS: Average lumbar bone mineral density 1.0 g/cm sq correlating to  a T value of -0.2 and a Z score of 1.4. Right femoral neck bone mineral  density 0.77 g/cm sq correlating to a T value of -0.7 and a Z score of  0.5. Left femoral neck bone mineral density 0.79 g/cm sq correlating to  a T value of -0.6 and a Z score of 0.6.     CONCLUSION: Normal bone mineral density.          Assessment/Plan   1.  Multifocal right breast cancer T1 BN(ic)-isolated tumor cells medial tumor being grade 3 ER/SD negative HER-2 3+ lateral tumor being T1b N0 grade 1 ER/SD positive HER-2 2+ negative fish Oncotype score of 4  ·   Positive family history of multiple malignancies genetic testing negative  · Weekly Taxol Herceptin started 9/28/2018 ×12 planned followed by aromatase inhibitor for her second tumor  · Arimidex started in 2/19-intolerant switch to Aromasin in 4/19    2.  Wound dehiscence no signs  of infection resutured-expander removed in 3/19 with permanent implant placed    3. Blurry vision.  Cataract surgery in 3 weeks    4. Left knee pain referral to orthopedics planned -degenerative disease of the hip aggravated by Arimidex    Plan  1.  Herceptin # 8/13 maintenance dose today   2.  Aromasin 25 mg to start as adjuvant therapy for her second tumor   3.. Cataract surgery after completion of Herceptin  4.  Return in 9 weeks to see me with Herceptin in 3 weeks in 6 weeks repeat echocardiogram in mid June

## 2019-05-10 ENCOUNTER — INFUSION (OUTPATIENT)
Dept: ONCOLOGY | Facility: HOSPITAL | Age: 64
End: 2019-05-10

## 2019-05-10 ENCOUNTER — OFFICE VISIT (OUTPATIENT)
Dept: ONCOLOGY | Facility: CLINIC | Age: 64
End: 2019-05-10

## 2019-05-10 VITALS
WEIGHT: 135 LBS | HEIGHT: 65 IN | TEMPERATURE: 98.1 F | RESPIRATION RATE: 16 BRPM | SYSTOLIC BLOOD PRESSURE: 144 MMHG | BODY MASS INDEX: 22.49 KG/M2 | OXYGEN SATURATION: 97 % | DIASTOLIC BLOOD PRESSURE: 81 MMHG | HEART RATE: 85 BPM

## 2019-05-10 DIAGNOSIS — Z79.899 ENCOUNTER FOR LONG-TERM (CURRENT) USE OF HIGH-RISK MEDICATION: ICD-10-CM

## 2019-05-10 DIAGNOSIS — Z17.1 MALIGNANT NEOPLASM OF OVERLAPPING SITES OF RIGHT BREAST IN FEMALE, ESTROGEN RECEPTOR NEGATIVE (HCC): ICD-10-CM

## 2019-05-10 DIAGNOSIS — Z17.0 MALIGNANT NEOPLASM OF OVERLAPPING SITES OF RIGHT BREAST IN FEMALE, ESTROGEN RECEPTOR POSITIVE (HCC): Primary | ICD-10-CM

## 2019-05-10 DIAGNOSIS — C50.811 MALIGNANT NEOPLASM OF OVERLAPPING SITES OF RIGHT BREAST IN FEMALE, ESTROGEN RECEPTOR POSITIVE (HCC): Primary | ICD-10-CM

## 2019-05-10 DIAGNOSIS — C50.811 MALIGNANT NEOPLASM OF OVERLAPPING SITES OF RIGHT BREAST IN FEMALE, ESTROGEN RECEPTOR NEGATIVE (HCC): Primary | ICD-10-CM

## 2019-05-10 DIAGNOSIS — Z17.1 MALIGNANT NEOPLASM OF OVERLAPPING SITES OF RIGHT BREAST IN FEMALE, ESTROGEN RECEPTOR NEGATIVE (HCC): Primary | ICD-10-CM

## 2019-05-10 DIAGNOSIS — C50.811 MALIGNANT NEOPLASM OF OVERLAPPING SITES OF RIGHT BREAST IN FEMALE, ESTROGEN RECEPTOR NEGATIVE (HCC): ICD-10-CM

## 2019-05-10 LAB
BASOPHILS # BLD AUTO: 0.04 10*3/MM3 (ref 0–0.2)
BASOPHILS NFR BLD AUTO: 0.7 % (ref 0–1.5)
DEPRECATED RDW RBC AUTO: 47.8 FL (ref 37–54)
EOSINOPHIL # BLD AUTO: 0.11 10*3/MM3 (ref 0–0.4)
EOSINOPHIL NFR BLD AUTO: 1.9 % (ref 0.3–6.2)
ERYTHROCYTE [DISTWIDTH] IN BLOOD BY AUTOMATED COUNT: 17.1 % (ref 12.3–15.4)
HCT VFR BLD AUTO: 37.6 % (ref 34–46.6)
HGB BLD-MCNC: 11.7 G/DL (ref 12–15.9)
IMM GRANULOCYTES # BLD AUTO: 0.01 10*3/MM3 (ref 0–0.05)
IMM GRANULOCYTES NFR BLD AUTO: 0.2 % (ref 0–0.5)
LYMPHOCYTES # BLD AUTO: 1.74 10*3/MM3 (ref 0.7–3.1)
LYMPHOCYTES NFR BLD AUTO: 30.7 % (ref 19.6–45.3)
MCH RBC QN AUTO: 24 PG (ref 26.6–33)
MCHC RBC AUTO-ENTMCNC: 31.1 G/DL (ref 31.5–35.7)
MCV RBC AUTO: 77.2 FL (ref 79–97)
MONOCYTES # BLD AUTO: 0.51 10*3/MM3 (ref 0.1–0.9)
MONOCYTES NFR BLD AUTO: 9 % (ref 5–12)
NEUTROPHILS # BLD AUTO: 3.26 10*3/MM3 (ref 1.7–7)
NEUTROPHILS NFR BLD AUTO: 57.5 % (ref 42.7–76)
NRBC BLD AUTO-RTO: 0 /100 WBC (ref 0–0.2)
PLATELET # BLD AUTO: 333 10*3/MM3 (ref 140–450)
PMV BLD AUTO: 9 FL (ref 6–12)
RBC # BLD AUTO: 4.87 10*6/MM3 (ref 3.77–5.28)
WBC NRBC COR # BLD: 5.67 10*3/MM3 (ref 3.4–10.8)

## 2019-05-10 PROCEDURE — 96413 CHEMO IV INFUSION 1 HR: CPT | Performed by: INTERNAL MEDICINE

## 2019-05-10 PROCEDURE — 99214 OFFICE O/P EST MOD 30 MIN: CPT | Performed by: INTERNAL MEDICINE

## 2019-05-10 PROCEDURE — 25010000002 TRASTUZUMAB PER 10 MG: Performed by: INTERNAL MEDICINE

## 2019-05-10 PROCEDURE — 85025 COMPLETE CBC W/AUTO DIFF WBC: CPT

## 2019-05-10 RX ORDER — SODIUM CHLORIDE 0.9 % (FLUSH) 0.9 %
10 SYRINGE (ML) INJECTION AS NEEDED
Status: CANCELLED | OUTPATIENT
Start: 2019-05-10

## 2019-05-10 RX ORDER — SODIUM CHLORIDE 9 MG/ML
250 INJECTION, SOLUTION INTRAVENOUS ONCE
Status: CANCELLED | OUTPATIENT
Start: 2019-06-21

## 2019-05-10 RX ORDER — SODIUM CHLORIDE 9 MG/ML
250 INJECTION, SOLUTION INTRAVENOUS ONCE
Status: COMPLETED | OUTPATIENT
Start: 2019-05-10 | End: 2019-05-10

## 2019-05-10 RX ORDER — SODIUM CHLORIDE 9 MG/ML
250 INJECTION, SOLUTION INTRAVENOUS ONCE
Status: CANCELLED | OUTPATIENT
Start: 2019-05-31

## 2019-05-10 RX ORDER — SODIUM CHLORIDE 0.9 % (FLUSH) 0.9 %
10 SYRINGE (ML) INJECTION AS NEEDED
Status: DISCONTINUED | OUTPATIENT
Start: 2019-05-10 | End: 2019-05-10 | Stop reason: HOSPADM

## 2019-05-10 RX ADMIN — SODIUM CHLORIDE, PRESERVATIVE FREE 10 ML: 5 INJECTION INTRAVENOUS at 11:57

## 2019-05-10 RX ADMIN — TRASTUZUMAB 380 MG: 150 INJECTION, POWDER, LYOPHILIZED, FOR SOLUTION INTRAVENOUS at 11:23

## 2019-05-10 RX ADMIN — SODIUM CHLORIDE 250 ML: 9 INJECTION, SOLUTION INTRAVENOUS at 11:15

## 2019-05-10 RX ADMIN — Medication 500 UNITS: at 11:57

## 2019-05-14 NOTE — PROGRESS NOTES
Subjective     REASON FOR CONSULTATION:    1.Multifocal right breast cancer post bilateral mastectomies- T1b N0+-10:00 tumor 8 mm grade 1 ER/MN positive HER-2 negative Oncotype 4  2.  5:00 tumor right breast ER/MN negative HER-2 positive 10 mm in size  3.  Negative genetic testing  4.  Adequate echo weekly Taxol Herceptin started on 9/21/18                          Referring physician Kwaku Gudino M.D.                            History of Present Illness : This is a 62-year-old lady with a multifocal right breast cancer T1b N0+ ER/MN negative HER-2 positive and the second T1b ER/MN positive HER-2 negative tumor here for maintenance Herceptin.  She was started on Arimidex  for her second tumor which was a low risk hormone positive tumor.  But after 2 months the pain in her hip and shin became unbearable and we switched to Aromasin which she has done very well overall    She saw the orthopedic doctor who thought she needed her left hip replaced and she does not want to do this at the time    She had a new implants put inand the wounds are healed well and I think we will proceed with treatment today      her ejection fraction is stable at 67% on 3/15/2019    Her bone density was reviewed and shows normal bone density     She denies headaches, speech disturbances, photophobia, new neuropathy.     Her appetite is good. Her bowels and bladder are working well.     She needs cataract surgery in the other eye and is going to wait till she finishes her Herceptin to start this    Past Medical History:   Diagnosis Date   • Environmental allergies    • GERD (gastroesophageal reflux disease)    • History of foreign travel     [place]   • Hypertension    • Malignant neoplasm of overlapping sites of right breast in female, estrogen receptor positive (CMS/HCC) 7/18/2018        Past Surgical History:   Procedure Laterality Date   • BREAST BIOPSY Right 2018    Malignant   • BREAST RECONSTRUCTION Bilateral 8/1/2018     Procedure: BILATERAL BREAST TISSUE EXPANDER INSERTION WITH ALLODERM;  Surgeon: Waterhouse, Maurine, MD;  [facility];  Service: Plastics   • BREAST TISSUE EXPANDER REMOVAL INSERTION OF IMPLANT Bilateral 3/22/2019    Procedure: BILATERAL BREAST TISSUE EXPANDER REMOVAL INSERTION OF BILATERAL BREAST IMPLANT;  Surgeon: Waterhouse, Maurine, MD;  Location:  [facility];  Service: Plastics   • COLONOSCOPY N/A 3/24/2017    Procedure: COLONOSCOPY TO CECUM AND TI;  Surgeon: Aleksander Bella MD;  [facility];  Service:    • ENDOSCOPY N/A 3/24/2017    Procedure: ESOPHAGOGASTRODUODENOSCOPY;  Surgeon: Aleksander Bella MD;  Location: [facility] ENDOSCOPY;  Service:    • EYE SURGERY Right 05/01/2019    lens transplant   • HYSTERECTOMY  1989   • MASTECTOMY W/ SENTINEL NODE BIOPSY Bilateral 8/1/2018    Procedure: BILATERAL TOTAL MASTECTOMY WITH RIGHT SENTINEL LYMPH MARTINEZ BIOPSY;  Surgeon: Kwaku Gudino MD;  Location: [facility] MAIN OR;  Service: General   • OOPHORECTOMY Bilateral 1989   • VENOUS ACCESS DEVICE (PORT) INSERTION Left 9/14/2018    Procedure: INSERTION VENOUS ACCESS DEVICE;  Surgeon: Kwaku Gudino MD;  Location: [facility] MAIN OR;  Service: General      ONCOLOGIC HISTORY;  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  The patient had never had a previous mammogram and was referred for evaluation at which time she was found to have a 4.2 cm mass in the upper outer quadrant of the right breast at the 9:30 position associated with pleomorphic calcifications in the lower inner quadrant also felt to be suspicious left breast was benign.  Patient underwent ultrasound guided biopsy on 6/15/18 with the biopsy at the 9:30 position with clip placement showing invasive mammary carcinoma ductal type low-grade ER % ME 9800% HER-2 2+ negative by fish.  Patient also underwent biopsy at the 4:00  position which showed a microscopic focus of invasive ductal carcinoma felt to be probably a lymphatic space involvement and was too small to do additional testing and a clip was placed.  Patient went on to have bilateral breast MRIs and was referred to Dr. Gudino MRIs confirmed 2 areas of abnormality at the breast-lower inner quadrant showed a large hematoma with a clip identified and there is extensive clumped abnormal enhancement beginning superior to the biopsy cavity and extending laterally to the entire upper half of the right breast into the upper outer quadrant measuring 1.2 x 1.4 cm and extending over 7 cm.  A biopsy clip was identified in this area the abnormal enhancement focally abutted the pectoralis muscle medially with no invasion through few borderline enlarged right axillary nodes with normal morphology no internal mammary adenopathy was noted left breast was benign.  Patient went on to have bilateral mastectomies the final path report showed 2 separate primaries in the breast associated with extensive high-grade DCIS measuring 6 cm  The lower inner quadrant lesion measured 1 cm was a grade 3 invasive ductal carcinoma ER 0 ME 0 HER-2 3+ and the lesion at the 9:30 position was a grade 1 invasive ductal carcinoma measuring 8 mm strongly ER/ME positive and HER-2 negative based on preop testing.  3 sentinel nodes were obtained 1 showed isolated tumor cells.  Margins showed  Positivity with DCIS in the inferior medial margin and was close to the deep margin (2mm) and inferior superficial margin(3mm).  Lymphovascular invasion was present.  Left breast is benign    Postoperatively she's done well and had her expanders filled and is here today to discuss adjuvant treatment.  Unfortunately she did not know about the testing on the second tumor at surgery and thought she had a grade 1 ER/ME positive HER-2 negative tumor and was very surprised when I told her the prior markers on the second tumor.    She is   0 para 0 menarche was at age 13 menopause at age 33 when she had a hysterectomy and oophorectomy.  She's been on hormone replacement since age of 33 until her recent diagnosis .    Family history is positive for mother having colon cancer age 72 maternal aunt with breast cancer in her 60s and another maternal aunt with uterine cancer in her 70s.  She's had negative genetic testing but the extended panel is being done and is pending    We discussed treatment plans and I have scheduled her for Chemotherapy education with tentative plans for weekly Taxol Herceptin for 12 weeks followed by Herceptin for a year  We will do an Oncotype DX score on the hormone positive tumor which is low-grade unlikely to be high risk    We discussed whether or not radiation would be indicated and except for the fact that there is a positive margin with high-grade DCIS normally she would not need radiation but I recommended a consultation because of the margins and the high grade nature of the DCIS.    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  She is presented at the multidiscipline conference and everybody felt comfortable with weekly Taxol Herceptin for 12 weeks followed by Herceptin for a year and Oncotype testing on the second tumor for which she would receive an aromatase inhibitor for 5  years unless it was high risk which is very unlikely    She is here today having had an echo which showed an adequate ejection fraction  Of 61% and she had a port placed and is here to start treatment.    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    Unfortunately the wrong specimen was sent for Oncotype testing and ER negative specimen was sent and I talked to the pathologist and they will send the correct specimen for Oncotype DX testing with no talked to the patient.    That he has no questions and is happy to finally get started with therapy.    Genetic testing results are negative using the [employer]  Panel    1/19  She is already having pain in her left knee and wants to see an orthopedic surgeon for this pain is pretty sharp and she is taking Celebrex to help deal with this    We discussed the side effects of Arimidex including hot flashes and joint pains but she is wanting to do this rather than tamoxifen because she does not like the side effect profile of tamoxifen    She's never had a bone density and we will order this also and make sure it is adequate    She has no significant neuropathy and she is happy to be off the Taxol-she tolerated the Herceptin alone well but wants to cut back on the Benadryl because it makes her too sleepy    She lost a lot of hair in the chronic for him because her [place] cooling Did not fit well on the vertex of her skull    She reports mild blurry vision that started approximately after the 10th dose of Taxol and her ophthalmologist as she has cataracts that are affecting the macular part of her iron she needs to have surgeries sometime in the next month.  I told her that Herceptin does not cause this and all the  steroid premeds may  have accelerated to cataract          Current Outpatient Medications on File Prior to Visit   Medication Sig Dispense Refill   • chlorthalidone (HYGROTON) 25 MG tablet Take 25 mg by mouth Every Night.     • exemestane (AROMASIN) 25 MG chemo tablet Take 1 tablet by mouth Daily for 90 days. 90 tablet 3   • fexofenadine-pseudoephedrine (ALLEGRA-D 24) 180-240 MG per 24 hr tablet Take 1 tablet by mouth Daily.     • omeprazole-sodium bicarbonate (ZEGERID)  MG per capsule Take 1 capsule by mouth Every Night.     • raNITIdine (ZANTAC) 150 MG tablet Take 150 mg by mouth Daily.     • Turmeric 500 MG tablet Take 1,000 mg by mouth Daily.     • celecoxib (CeleBREX) 100 MG capsule Take 100 mg by mouth As Needed.       No current facility-administered medications on file prior to visit.         ALLERGIES:    Allergies   Allergen Reactions   • Moxifloxacin Anaphylaxis and Rash   • Aloxi [Palonosetron Hcl] Itching   • Latex Rash     Added based on information entered during log entry, please review and add reactions, type, and severity as needed        Social History     Socioeconomic History   • Marital status:      Spouse name: FLORINA Gross   • Number of children: 0   • Years of education: Graduate School   • Highest education level: Not on file   Occupational History   • Occupation: Physician     Employer: [employer]   Tobacco Use   • Smoking status: Never Smoker   • Smokeless tobacco: Never Used   Substance and Sexual Activity   • Alcohol use: Yes     Types: 1 Glasses of wine per week   • Drug use: No   • Sexual activity: Defer     Comment:         Family History   Problem Relation Age of Onset   • Colon cancer Mother 72   • Breast cancer Maternal Aunt 60   • Uterine cancer Maternal Aunt 75   • Stroke Father    • Malig Hyperthermia Neg Hx         Review of Systems   Constitutional: Positive for diaphoresis.   Gastrointestinal:        Chronic indigestion   Endocrine: Positive for  "heat intolerance.   Neurological: 1-st1stgstrstastdstest numbness.    Left knee pain 6/10 taking Celebrex    Objective     Vitals:    05/10/19 1007   BP: 144/81   Pulse: 85   Resp: 16   Temp: 98.1 °F (36.7 °C)   SpO2: 97%   Weight: 61.2 kg (135 lb)   Height: 165 cm (64.96\")   PainSc: 0-No pain     Current Status 5/10/2019   ECOG score 0       Physical Exam    GENERAL:  Well-developed, well-nourished in no acute distress.   SKIN:  Warm, dry without rashes, purpura or petechiae.  EYES:  Pupils equal, round and reactive to light.  EOMs intact.  Conjunctivae normal. Mild opacity of the right eye.   EARS:  Hearing intact.  NOSE:  Septum midline.  No excoriations or nasal discharge.  MOUTH:  Tongue is well-papillated; no stomatitis or ulcers.  Lips normal.  THROAT:  Oropharynx without lesions or exudates.  NECK:  Supple with good range of motion; no thyromegaly or masses, no JVD.  LYMPHATICS:  No cervical, supraclavicular, axillary or inguinal adenopathy.  CHEST:  Lungs clear to auscultation. Good airflow.  BREATS: Left breast is benign with an implant the right breast has an implant in place incision looks clean  CARDIAC:  Regular rate and rhythm without murmurs, rubs or gallops. Normal S1,S2.  ABDOMEN:  Soft, nontender with no hepatosplenomegaly or masses.  EXTREMITIES:  No clubbing, cyanosis or edema.  No obvious fluid or warmth in the left knee  NEUROLOGICAL:  Cranial Nerves II-XII grossly intact.  No focal neurological deficits.  PSYCHIATRIC:  Normal affect and mood.        RECENT LABS:  Hematology WBC   Date Value Ref Range Status   05/10/2019 5.67 3.40 - 10.80 10*3/mm3 Final     RBC   Date Value Ref Range Status   05/10/2019 4.87 3.77 - 5.28 10*6/mm3 Final     Hemoglobin   Date Value Ref Range Status   05/10/2019 11.7 (L) 12.0 - 15.9 g/dL Final     Hematocrit   Date Value Ref Range Status   05/10/2019 37.6 34.0 - 46.6 % Final     Platelets   Date Value Ref Range Status   05/10/2019 333 140 - 450 10*3/mm3 Final        SYNOPTIC " REPORT (Based on [facility] Cancer Case Summary, version January 2018):                Procedure: Total mastectomy.               Specimen/tumor laterality: Right.                Tumor site: Lower inner quadrant and central/inferior breast.                 Tumor size (greastest dimension of largest invasive focus): 1 cm (lower inner                       quadrant tumor).               Histologic type: Invasive carcinoma of no special type (ductal, not otherwise specified).                 Histologic grade ([place] Histologic Score):                              Glandular (acinar)/tubular differentiation: Score 3.                             Nuclear pleomorphism: Score 3.                             Mitotic rate: Score 2.                            Overall grade: Grade 3 (score 8 of 9).                            NOTE: The above [place] Histologic Score applies to the largest and                                    highest grade tumor (lower inner quadrant tumor).  The tumor in the                                    central/inferior breast is intermediate grade (tubular score 3, nuclear                                    score 2, mitotic score 1).               Tumor focality: Multiple foci of invasive carcinoma.                             Number of foci: 2.                              Sizes of individual foci: 1 cm (lower inner quadrant tumor) and approximately                                    0.8 cm (central/inferior tumor).               Ductal carcinoma in situ (DCIS): Present, positive for extensive intraductal component.                            Size (extent) of DCIS: Approximately greater than 6 cm (estimated based                                    on gross and microscopic findings).                            Architectural pattern: Solid and cribriform.                            Nuclear grade: Grade III (high).                            Necrosis: Central comedonecrosis.               Lobular carcinoma  in situ (LCIS): No LCIS in specimen.               Margins:                             Invasive carcinoma margins: Uninvolved by invasive carcinoma.                                          Distance from closest margin: 2 mm (deep margin).                             DCIS margins: Positive for DCIS (inferior medial margin [lower inner quadrant]).                                          Additional DCIS margins: DCIS extends to within 2 mm of deep margin                                                 and to within 3 mm of inferior superficial soft tissue margin.  Atypical                                                 ductal hyperplasia is focally present at the deep margin.               Regional lymph nodes: Involved by tumor cells.                             Number of lymph nodes with macrometastases: 0.                            Number of lymph nodes with micrometastases: 0.                            Number of lymph nodes with isolated tumor cells: 1.                            Number of lymph nodes examined: 3.                             Number of sentinel lymph nodes examined: 3.                Treatment effect: No known presurgical therapy.                Lymphovascular invasion: Present.               Dermal lymphovascular invasion: Not identified.                Pathologic stage classification:                             TNM descriptors: m (multiple foci of invasive carcinoma).                             Primary tumor: pT1b.                            Regional lymph nodes: pN0(i+)(sn).                             Distant metastasis: Not applicable.                Additional pathologic findings:                              Ductal hyperplasia of the usual type.                              Fibroinflammatory changes consistent with sites of prior biopsy (2 prior biopsy                                    sites).                 Ancillary studies:ER OH negative HER-2 positive lower inner quadrant  tumor    Echocardiogram  Reading physician: Keily Jensen MD Ordering physician: Cecy Treviño MD Study date: 12/17/18     Study Description     A two-dimensional transthoracic echocardiogram with color flow and Doppler was performed. The study is technically difficult for diagnosis.Verbal consent was obtained from the patient to use Definity contrast in order to optimize the study. The use of Definity was indicated as two or more contiguous segments of the left ventricular endocardial border were unable to be adequately visualized by standard imaging methods. 1.3 mL of mechanically activated Definity was mixed with 8.7 mL of normal saline. A total of 2 mL of the resulting Definity solution was administered, and the remaining contrast was wasted and discarded.   No adverse reaction to contrast was noted.   9/14/18 EF 61% No GLS     DEXA BONE DENSITY AXIAL  FINDINGS: Average lumbar bone mineral density 1.0 g/cm sq correlating to  a T value of -0.2 and a Z score of 1.4. Right femoral neck bone mineral  density 0.77 g/cm sq correlating to a T value of -0.7 and a Z score of  0.5. Left femoral neck bone mineral density 0.79 g/cm sq correlating to  a T value of -0.6 and a Z score of 0.6.     CONCLUSION: Normal bone mineral density.     This report was finalized on 3/18/2019      Echocardiogram  Study Description     A two-dimensional transthoracic echocardiogram with color flow and Doppler was performed.   The study is technically difficult for diagnosis. The quality of the study is limited due to poor acoustic windows related to breast implants. Verbal consent was obtained from the patient to use Definity contrast in order to optimize the study. Note also that contrast was used to enhance ejection fraction reproducability of the LV ejection fraction on serial echocardiograms.  The use of Definity was indicated as two or more contiguous segments of the left ventricular endocardial border were unable to be adequately  visualized by standard imaging methods. 1.3 mL of mechanically activated Definity was mixed with 8.7 mL of normal saline. A total of 2 mL of the resulting Definity solution was administered, and the remaining contrast was wasted and discarded.   No adverse reaction to contrast was noted.   No GLS obtained due to breast implants and spacers  12/17/18  EF=57%    09/14/18  EF=61%.   Electronically signed by Keily Jensen MD on 3/15/19 at 1713 EDT       FINDINGS: Average lumbar bone mineral density 1.0 g/cm sq correlating to  a T value of -0.2 and a Z score of 1.4. Right femoral neck bone mineral  density 0.77 g/cm sq correlating to a T value of -0.7 and a Z score of  0.5. Left femoral neck bone mineral density 0.79 g/cm sq correlating to  a T value of -0.6 and a Z score of 0.6.     CONCLUSION: Normal bone mineral density.          Assessment/Plan   1.  Multifocal right breast cancer T1 BN(ic)-isolated tumor cells medial tumor being grade 3 ER/RI negative HER-2 3+ lateral tumor being T1b N0 grade 1 ER/RI positive HER-2 2+ negative fish Oncotype score of 4  ·   Positive family history of multiple malignancies genetic testing negative  · Weekly Taxol Herceptin started 9/28/2018 ×12 planned followed by aromatase inhibitor for her second tumor  · Arimidex started in 2/19-intolerant switch to Aromasin in 4/19    2.  Wound dehiscence no signs of infection resutured-expander removed in 3/19 with permanent implant placed    3. Blurry vision.  Cataract surgery in 3 weeks    4. Left knee pain referral to orthopedics planned -degenerative disease of the hip aggravated by Arimidex    Plan  1.  Herceptin # 8/13 maintenance dose today   2.  Aromasin 25 mg to start as adjuvant therapy for her second tumor   3.. Cataract surgery after completion of Herceptin  4.  Return in 9 weeks to see me with Herceptin in 3 weeks in 6 weeks repeat echocardiogram in mid June

## 2019-05-31 ENCOUNTER — INFUSION (OUTPATIENT)
Dept: ONCOLOGY | Facility: HOSPITAL | Age: 64
End: 2019-05-31

## 2019-05-31 VITALS
DIASTOLIC BLOOD PRESSURE: 77 MMHG | BODY MASS INDEX: 22.74 KG/M2 | TEMPERATURE: 98 F | SYSTOLIC BLOOD PRESSURE: 130 MMHG | WEIGHT: 136.5 LBS | HEART RATE: 94 BPM

## 2019-05-31 DIAGNOSIS — C50.811 MALIGNANT NEOPLASM OF OVERLAPPING SITES OF RIGHT BREAST IN FEMALE, ESTROGEN RECEPTOR NEGATIVE (HCC): Primary | ICD-10-CM

## 2019-05-31 DIAGNOSIS — Z17.1 MALIGNANT NEOPLASM OF OVERLAPPING SITES OF RIGHT BREAST IN FEMALE, ESTROGEN RECEPTOR NEGATIVE (HCC): Primary | ICD-10-CM

## 2019-05-31 DIAGNOSIS — Z79.899 ENCOUNTER FOR LONG-TERM (CURRENT) USE OF HIGH-RISK MEDICATION: ICD-10-CM

## 2019-05-31 LAB
BASOPHILS # BLD AUTO: 0.04 10*3/MM3 (ref 0–0.2)
BASOPHILS NFR BLD AUTO: 0.7 % (ref 0–1.5)
DEPRECATED RDW RBC AUTO: 49.3 FL (ref 37–54)
EOSINOPHIL # BLD AUTO: 0.12 10*3/MM3 (ref 0–0.4)
EOSINOPHIL NFR BLD AUTO: 2 % (ref 0.3–6.2)
ERYTHROCYTE [DISTWIDTH] IN BLOOD BY AUTOMATED COUNT: 17.3 % (ref 12.3–15.4)
HCT VFR BLD AUTO: 38.6 % (ref 34–46.6)
HGB BLD-MCNC: 11.9 G/DL (ref 12–15.9)
IMM GRANULOCYTES # BLD AUTO: 0.01 10*3/MM3 (ref 0–0.05)
IMM GRANULOCYTES NFR BLD AUTO: 0.2 % (ref 0–0.5)
LYMPHOCYTES # BLD AUTO: 1.55 10*3/MM3 (ref 0.7–3.1)
LYMPHOCYTES NFR BLD AUTO: 25.5 % (ref 19.6–45.3)
MCH RBC QN AUTO: 24.1 PG (ref 26.6–33)
MCHC RBC AUTO-ENTMCNC: 30.8 G/DL (ref 31.5–35.7)
MCV RBC AUTO: 78.1 FL (ref 79–97)
MONOCYTES # BLD AUTO: 0.47 10*3/MM3 (ref 0.1–0.9)
MONOCYTES NFR BLD AUTO: 7.7 % (ref 5–12)
NEUTROPHILS # BLD AUTO: 3.9 10*3/MM3 (ref 1.7–7)
NEUTROPHILS NFR BLD AUTO: 63.9 % (ref 42.7–76)
NRBC BLD AUTO-RTO: 0 /100 WBC (ref 0–0.2)
PLATELET # BLD AUTO: 301 10*3/MM3 (ref 140–450)
PMV BLD AUTO: 8.5 FL (ref 6–12)
RBC # BLD AUTO: 4.94 10*6/MM3 (ref 3.77–5.28)
WBC NRBC COR # BLD: 6.09 10*3/MM3 (ref 3.4–10.8)

## 2019-05-31 PROCEDURE — 85025 COMPLETE CBC W/AUTO DIFF WBC: CPT | Performed by: INTERNAL MEDICINE

## 2019-05-31 PROCEDURE — 25010000002 TRASTUZUMAB PER 10 MG: Performed by: INTERNAL MEDICINE

## 2019-05-31 PROCEDURE — 96413 CHEMO IV INFUSION 1 HR: CPT | Performed by: INTERNAL MEDICINE

## 2019-05-31 RX ORDER — SODIUM CHLORIDE 0.9 % (FLUSH) 0.9 %
10 SYRINGE (ML) INJECTION AS NEEDED
Status: DISCONTINUED | OUTPATIENT
Start: 2019-05-31 | End: 2019-05-31 | Stop reason: HOSPADM

## 2019-05-31 RX ORDER — SODIUM CHLORIDE 0.9 % (FLUSH) 0.9 %
10 SYRINGE (ML) INJECTION AS NEEDED
Status: CANCELLED | OUTPATIENT
Start: 2019-05-31

## 2019-05-31 RX ORDER — SODIUM CHLORIDE 9 MG/ML
250 INJECTION, SOLUTION INTRAVENOUS ONCE
Status: COMPLETED | OUTPATIENT
Start: 2019-05-31 | End: 2019-05-31

## 2019-05-31 RX ADMIN — SODIUM CHLORIDE 250 ML: 9 INJECTION, SOLUTION INTRAVENOUS at 09:45

## 2019-05-31 RX ADMIN — TRASTUZUMAB 380 MG: 150 INJECTION, POWDER, LYOPHILIZED, FOR SOLUTION INTRAVENOUS at 09:48

## 2019-05-31 RX ADMIN — SODIUM CHLORIDE, PRESERVATIVE FREE 10 ML: 5 INJECTION INTRAVENOUS at 10:24

## 2019-05-31 RX ADMIN — Medication 500 UNITS: at 10:24

## 2019-06-07 ENCOUNTER — HOSPITAL ENCOUNTER (OUTPATIENT)
Dept: CARDIOLOGY | Facility: HOSPITAL | Age: 64
Discharge: HOME | End: 2019-06-07
Admitting: INTERNAL MEDICINE

## 2019-06-07 VITALS
DIASTOLIC BLOOD PRESSURE: 82 MMHG | OXYGEN SATURATION: 99 % | HEART RATE: 72 BPM | BODY MASS INDEX: 22.53 KG/M2 | SYSTOLIC BLOOD PRESSURE: 130 MMHG | HEIGHT: 64 IN | WEIGHT: 132 LBS

## 2019-06-07 DIAGNOSIS — Z17.0 MALIGNANT NEOPLASM OF OVERLAPPING SITES OF RIGHT BREAST IN FEMALE, ESTROGEN RECEPTOR POSITIVE (HCC): ICD-10-CM

## 2019-06-07 DIAGNOSIS — C50.811 MALIGNANT NEOPLASM OF OVERLAPPING SITES OF RIGHT BREAST IN FEMALE, ESTROGEN RECEPTOR POSITIVE (HCC): ICD-10-CM

## 2019-06-07 LAB
ASCENDING AORTA: 3.1 CM
BH CV ECHO MEAS - ACS: 1.7 CM
BH CV ECHO MEAS - AO MAX PG (FULL): 1.7 MMHG
BH CV ECHO MEAS - AO MAX PG: 5.7 MMHG
BH CV ECHO MEAS - AO MEAN PG (FULL): 0.65 MMHG
BH CV ECHO MEAS - AO MEAN PG: 2.8 MMHG
BH CV ECHO MEAS - AO ROOT AREA (BSA CORRECTED): 1.9
BH CV ECHO MEAS - AO ROOT AREA: 7.5 CM^2
BH CV ECHO MEAS - AO ROOT DIAM: 3.1 CM
BH CV ECHO MEAS - AO V2 MAX: 119.4 CM/SEC
BH CV ECHO MEAS - AO V2 MEAN: 76.2 CM/SEC
BH CV ECHO MEAS - AO V2 VTI: 22.5 CM
BH CV ECHO MEAS - AVA(I,A): 2.4 CM^2
BH CV ECHO MEAS - AVA(I,D): 2.4 CM^2
BH CV ECHO MEAS - AVA(V,A): 2.1 CM^2
BH CV ECHO MEAS - AVA(V,D): 2.1 CM^2
BH CV ECHO MEAS - BSA(HAYCOCK): 1.6 M^2
BH CV ECHO MEAS - BSA: 1.6 M^2
BH CV ECHO MEAS - BZI_BMI: 22.7 KILOGRAMS/M^2
BH CV ECHO MEAS - BZI_METRIC_HEIGHT: 162.6 CM
BH CV ECHO MEAS - BZI_METRIC_WEIGHT: 59.9 KG
BH CV ECHO MEAS - EDV(MOD-SP2): 49 ML
BH CV ECHO MEAS - EDV(MOD-SP4): 59 ML
BH CV ECHO MEAS - EDV(TEICH): 59 ML
BH CV ECHO MEAS - EF(CUBED): 72 %
BH CV ECHO MEAS - EF(MOD-BP): 67 %
BH CV ECHO MEAS - EF(MOD-SP2): 67.3 %
BH CV ECHO MEAS - EF(MOD-SP4): 66.1 %
BH CV ECHO MEAS - EF(TEICH): 64.6 %
BH CV ECHO MEAS - ESV(MOD-SP2): 16 ML
BH CV ECHO MEAS - ESV(MOD-SP4): 20 ML
BH CV ECHO MEAS - ESV(TEICH): 20.9 ML
BH CV ECHO MEAS - FS: 34.6 %
BH CV ECHO MEAS - IVS/LVPW: 1
BH CV ECHO MEAS - IVSD: 0.97 CM
BH CV ECHO MEAS - LAT PEAK E' VEL: 10 CM/SEC
BH CV ECHO MEAS - LV DIASTOLIC VOL/BSA (35-75): 36 ML/M^2
BH CV ECHO MEAS - LV MASS(C)D: 105.3 GRAMS
BH CV ECHO MEAS - LV MASS(C)DI: 64.3 GRAMS/M^2
BH CV ECHO MEAS - LV MAX PG: 4 MMHG
BH CV ECHO MEAS - LV MEAN PG: 2.2 MMHG
BH CV ECHO MEAS - LV SYSTOLIC VOL/BSA (12-30): 12.2 ML/M^2
BH CV ECHO MEAS - LV V1 MAX: 99.9 CM/SEC
BH CV ECHO MEAS - LV V1 MEAN: 67.7 CM/SEC
BH CV ECHO MEAS - LV V1 VTI: 21.9 CM
BH CV ECHO MEAS - LVIDD: 3.7 CM
BH CV ECHO MEAS - LVIDS: 2.4 CM
BH CV ECHO MEAS - LVLD AP2: 6.1 CM
BH CV ECHO MEAS - LVLD AP4: 6.5 CM
BH CV ECHO MEAS - LVLS AP2: 4.6 CM
BH CV ECHO MEAS - LVLS AP4: 5.2 CM
BH CV ECHO MEAS - LVOT AREA (M): 2.5 CM^2
BH CV ECHO MEAS - LVOT AREA: 2.5 CM^2
BH CV ECHO MEAS - LVOT DIAM: 1.8 CM
BH CV ECHO MEAS - LVPWD: 0.93 CM
BH CV ECHO MEAS - MED PEAK E' VEL: 8 CM/SEC
BH CV ECHO MEAS - MV A DUR: 0.1 SEC
BH CV ECHO MEAS - MV A MAX VEL: 97.4 CM/SEC
BH CV ECHO MEAS - MV DEC SLOPE: 406.6 CM/SEC^2
BH CV ECHO MEAS - MV DEC TIME: 0.13 SEC
BH CV ECHO MEAS - MV E MAX VEL: 72.7 CM/SEC
BH CV ECHO MEAS - MV E/A: 0.75
BH CV ECHO MEAS - MV MAX PG: 4.2 MMHG
BH CV ECHO MEAS - MV MEAN PG: 1.9 MMHG
BH CV ECHO MEAS - MV P1/2T MAX VEL: 79.6 CM/SEC
BH CV ECHO MEAS - MV P1/2T: 57.4 MSEC
BH CV ECHO MEAS - MV V2 MAX: 102.6 CM/SEC
BH CV ECHO MEAS - MV V2 MEAN: 62.7 CM/SEC
BH CV ECHO MEAS - MV V2 VTI: 22.1 CM
BH CV ECHO MEAS - MVA P1/2T LCG: 2.8 CM^2
BH CV ECHO MEAS - MVA(P1/2T): 3.8 CM^2
BH CV ECHO MEAS - MVA(VTI): 2.5 CM^2
BH CV ECHO MEAS - PA MAX PG (FULL): 2.2 MMHG
BH CV ECHO MEAS - PA MAX PG: 3.2 MMHG
BH CV ECHO MEAS - PA V2 MAX: 89.3 CM/SEC
BH CV ECHO MEAS - PULM A REVS DUR: 0.12 SEC
BH CV ECHO MEAS - PULM A REVS VEL: 38 CM/SEC
BH CV ECHO MEAS - PULM DIAS VEL: 54 CM/SEC
BH CV ECHO MEAS - PULM S/D: 1.3
BH CV ECHO MEAS - PULM SYS VEL: 71 CM/SEC
BH CV ECHO MEAS - RV MAX PG: 0.96 MMHG
BH CV ECHO MEAS - RV MEAN PG: 0.49 MMHG
BH CV ECHO MEAS - RV V1 MAX: 49 CM/SEC
BH CV ECHO MEAS - RV V1 MEAN: 32.2 CM/SEC
BH CV ECHO MEAS - RV V1 VTI: 9.2 CM
BH CV ECHO MEAS - SI(AO): 103.5 ML/M^2
BH CV ECHO MEAS - SI(CUBED): 22.7 ML/M^2
BH CV ECHO MEAS - SI(LVOT): 33.5 ML/M^2
BH CV ECHO MEAS - SI(MOD-SP2): 20.1 ML/M^2
BH CV ECHO MEAS - SI(MOD-SP4): 23.8 ML/M^2
BH CV ECHO MEAS - SI(TEICH): 23.2 ML/M^2
BH CV ECHO MEAS - SUP REN AO DIAM: 1.8 CM
BH CV ECHO MEAS - SV(AO): 169.7 ML
BH CV ECHO MEAS - SV(CUBED): 37.2 ML
BH CV ECHO MEAS - SV(LVOT): 54.9 ML
BH CV ECHO MEAS - SV(MOD-SP2): 33 ML
BH CV ECHO MEAS - SV(MOD-SP4): 39 ML
BH CV ECHO MEAS - SV(TEICH): 38.1 ML
BH CV ECHO MEAS - TAPSE (>1.6): 2.2 CM2
BH CV ECHO MEASUREMENTS AVERAGE E/E' RATIO: 8.08
BH CV XLRA - RV BASE: 2.6 CM
BH CV XLRA - TDI S': 13 CM/SEC
LEFT ATRIUM VOLUME INDEX: 16 ML/M2
LEFT ATRIUM VOLUME: 26 CM3
LV EF 2D ECHO EST: 67 %
MAXIMAL PREDICTED HEART RATE: 158 BPM
SINUS: 3.1 CM
STJ: 3 CM
STRESS TARGET HR: 134 BPM

## 2019-06-07 PROCEDURE — 93306 TTE W/DOPPLER COMPLETE: CPT | Performed by: INTERNAL MEDICINE

## 2019-06-07 PROCEDURE — 93306 TTE W/DOPPLER COMPLETE: CPT

## 2019-06-07 PROCEDURE — 25010000002 PERFLUTREN (DEFINITY) 8.476 MG IN SODIUM CHLORIDE 0.9 % 10 ML INJECTION: Performed by: INTERNAL MEDICINE

## 2019-06-07 RX ADMIN — PERFLUTREN 1.5 ML: 6.52 INJECTION, SUSPENSION INTRAVENOUS at 09:29

## 2019-06-21 ENCOUNTER — OFFICE VISIT (OUTPATIENT)
Dept: ONCOLOGY | Facility: CLINIC | Age: 64
End: 2019-06-21

## 2019-06-21 ENCOUNTER — INFUSION (OUTPATIENT)
Dept: ONCOLOGY | Facility: HOSPITAL | Age: 64
End: 2019-06-21

## 2019-06-21 VITALS
TEMPERATURE: 97.9 F | WEIGHT: 133.4 LBS | BODY MASS INDEX: 22.77 KG/M2 | SYSTOLIC BLOOD PRESSURE: 135 MMHG | HEIGHT: 64 IN | RESPIRATION RATE: 16 BRPM | OXYGEN SATURATION: 99 % | DIASTOLIC BLOOD PRESSURE: 81 MMHG | HEART RATE: 87 BPM

## 2019-06-21 DIAGNOSIS — C50.811 MALIGNANT NEOPLASM OF OVERLAPPING SITES OF RIGHT BREAST IN FEMALE, ESTROGEN RECEPTOR NEGATIVE (HCC): Primary | ICD-10-CM

## 2019-06-21 DIAGNOSIS — C50.811 MALIGNANT NEOPLASM OF OVERLAPPING SITES OF RIGHT BREAST IN FEMALE, ESTROGEN RECEPTOR POSITIVE (HCC): Primary | ICD-10-CM

## 2019-06-21 DIAGNOSIS — C50.811 MALIGNANT NEOPLASM OF OVERLAPPING SITES OF RIGHT BREAST IN FEMALE, ESTROGEN RECEPTOR NEGATIVE (HCC): ICD-10-CM

## 2019-06-21 DIAGNOSIS — Z79.899 ENCOUNTER FOR LONG-TERM (CURRENT) USE OF HIGH-RISK MEDICATION: ICD-10-CM

## 2019-06-21 DIAGNOSIS — Z17.1 MALIGNANT NEOPLASM OF OVERLAPPING SITES OF RIGHT BREAST IN FEMALE, ESTROGEN RECEPTOR NEGATIVE (HCC): ICD-10-CM

## 2019-06-21 DIAGNOSIS — Z17.0 MALIGNANT NEOPLASM OF OVERLAPPING SITES OF RIGHT BREAST IN FEMALE, ESTROGEN RECEPTOR POSITIVE (HCC): Primary | ICD-10-CM

## 2019-06-21 DIAGNOSIS — Z17.1 MALIGNANT NEOPLASM OF OVERLAPPING SITES OF RIGHT BREAST IN FEMALE, ESTROGEN RECEPTOR NEGATIVE (HCC): Primary | ICD-10-CM

## 2019-06-21 LAB
BASOPHILS # BLD AUTO: 0.04 10*3/MM3 (ref 0–0.2)
BASOPHILS NFR BLD AUTO: 0.7 % (ref 0–1.5)
DEPRECATED RDW RBC AUTO: 50 FL (ref 37–54)
EOSINOPHIL # BLD AUTO: 0.11 10*3/MM3 (ref 0–0.4)
EOSINOPHIL NFR BLD AUTO: 1.9 % (ref 0.3–6.2)
ERYTHROCYTE [DISTWIDTH] IN BLOOD BY AUTOMATED COUNT: 17.4 % (ref 12.3–15.4)
HCT VFR BLD AUTO: 37.9 % (ref 34–46.6)
HGB BLD-MCNC: 11.7 G/DL (ref 12–15.9)
IMM GRANULOCYTES # BLD AUTO: 0.01 10*3/MM3 (ref 0–0.05)
IMM GRANULOCYTES NFR BLD AUTO: 0.2 % (ref 0–0.5)
LYMPHOCYTES # BLD AUTO: 1.37 10*3/MM3 (ref 0.7–3.1)
LYMPHOCYTES NFR BLD AUTO: 23.4 % (ref 19.6–45.3)
MCH RBC QN AUTO: 24.5 PG (ref 26.6–33)
MCHC RBC AUTO-ENTMCNC: 30.9 G/DL (ref 31.5–35.7)
MCV RBC AUTO: 79.5 FL (ref 79–97)
MONOCYTES # BLD AUTO: 0.45 10*3/MM3 (ref 0.1–0.9)
MONOCYTES NFR BLD AUTO: 7.7 % (ref 5–12)
NEUTROPHILS # BLD AUTO: 3.88 10*3/MM3 (ref 1.7–7)
NEUTROPHILS NFR BLD AUTO: 66.1 % (ref 42.7–76)
NRBC BLD AUTO-RTO: 0 /100 WBC (ref 0–0.2)
PLATELET # BLD AUTO: 306 10*3/MM3 (ref 140–450)
PMV BLD AUTO: 8.9 FL (ref 6–12)
RBC # BLD AUTO: 4.77 10*6/MM3 (ref 3.77–5.28)
WBC NRBC COR # BLD: 5.86 10*3/MM3 (ref 3.4–10.8)

## 2019-06-21 PROCEDURE — 96413 CHEMO IV INFUSION 1 HR: CPT | Performed by: INTERNAL MEDICINE

## 2019-06-21 PROCEDURE — 25010000002 TRASTUZUMAB PER 10 MG: Performed by: INTERNAL MEDICINE

## 2019-06-21 PROCEDURE — 85025 COMPLETE CBC W/AUTO DIFF WBC: CPT

## 2019-06-21 PROCEDURE — 99214 OFFICE O/P EST MOD 30 MIN: CPT | Performed by: INTERNAL MEDICINE

## 2019-06-21 RX ORDER — SODIUM CHLORIDE 0.9 % (FLUSH) 0.9 %
10 SYRINGE (ML) INJECTION AS NEEDED
Status: CANCELLED | OUTPATIENT
Start: 2019-06-21

## 2019-06-21 RX ORDER — SODIUM CHLORIDE 9 MG/ML
250 INJECTION, SOLUTION INTRAVENOUS ONCE
Status: CANCELLED | OUTPATIENT
Start: 2019-08-23

## 2019-06-21 RX ORDER — SODIUM CHLORIDE 9 MG/ML
250 INJECTION, SOLUTION INTRAVENOUS ONCE
Status: COMPLETED | OUTPATIENT
Start: 2019-06-21 | End: 2019-06-21

## 2019-06-21 RX ORDER — SODIUM CHLORIDE 9 MG/ML
250 INJECTION, SOLUTION INTRAVENOUS ONCE
Status: CANCELLED | OUTPATIENT
Start: 2019-08-02

## 2019-06-21 RX ORDER — SODIUM CHLORIDE 9 MG/ML
250 INJECTION, SOLUTION INTRAVENOUS ONCE
Status: CANCELLED | OUTPATIENT
Start: 2019-07-12

## 2019-06-21 RX ADMIN — Medication 500 UNITS: at 11:02

## 2019-06-21 RX ADMIN — SODIUM CHLORIDE 250 ML: 900 INJECTION, SOLUTION INTRAVENOUS at 10:16

## 2019-06-21 RX ADMIN — TRASTUZUMAB 380 MG: 150 INJECTION, POWDER, LYOPHILIZED, FOR SOLUTION INTRAVENOUS at 10:30

## 2019-06-21 NOTE — PROGRESS NOTES
Subjective     REASON FOR CONSULTATION:    1.Multifocal right breast cancer post bilateral mastectomies- T1b N0+-10:00 tumor 8 mm grade 1 ER/WA positive HER-2 negative Oncotype 4  2.  5:00 tumor right breast ER/WA negative HER-2 positive 10 mm in size  3.  Negative genetic testing  4.  Adequate echo weekly Taxol Herceptin started on 9/21/18                          Referring physician Kwaku Gudino M.D.                            History of Present Illness : This is a 62-year-old lady with a multifocal right breast cancer T1b N0+ ER/WA negative HER-2 positive and the second T1b ER/WA positive HER-2 negative tumor here for maintenance Herceptin.  She was started on Arimidex  for her second tumor which was a low risk hormone positive tumor.  But after 2 months the pain in her hip and shin became unbearable and we switched to Aromasin which she has done very well overall    She saw the orthopedic doctor who thought she needed her left hip replaced and she does not want to do this at the time     her ejection fraction is stable at 67% on  6/7/2019 and the echo report was reviewed    Her bone density was reviewed and shows normal bone density     She denies headaches, speech disturbances, photophobia, new neuropathy.     Her appetite is good. Her bowels and bladder are working well.     She needs cataract surgery in the other eye and is going to wait till she finishes her Herceptin to start this    Past Medical History:   Diagnosis Date   • Environmental allergies    • GERD (gastroesophageal reflux disease)    • History of foreign travel     [place]   • Hypertension    • Malignant neoplasm of overlapping sites of right breast in female, estrogen receptor positive (CMS/HCC) 7/18/2018        Past Surgical History:   Procedure Laterality Date   • BREAST BIOPSY Right 2018    Malignant   • BREAST RECONSTRUCTION Bilateral 8/1/2018    Procedure: BILATERAL BREAST TISSUE EXPANDER INSERTION WITH ALLODERM;  Surgeon:  Waterhouse, Maurine, MD;  Location: [facility] MAIN OR;  Service: Plastics   • BREAST TISSUE EXPANDER REMOVAL INSERTION OF IMPLANT Bilateral 3/22/2019    Procedure: BILATERAL BREAST TISSUE EXPANDER REMOVAL INSERTION OF BILATERAL BREAST IMPLANT;  Surgeon: Waterhouse, Maurine, MD;  Location: [facility] OR OSC;  Service: Plastics   • COLONOSCOPY N/A 3/24/2017    Procedure: COLONOSCOPY TO CECUM AND TI;  Surgeon: Aleksander Bella MD;  Location: [facility] ENDOSCOPY;  Service:    • ENDOSCOPY N/A 3/24/2017    Procedure: ESOPHAGOGASTRODUODENOSCOPY;  Surgeon: Aleksander Bella MD;  Location: [facility] ENDOSCOPY;  Service:    • EYE SURGERY Right 05/01/2019    lens transplant   • HYSTERECTOMY  1989   • MASTECTOMY W/ SENTINEL NODE BIOPSY Bilateral 8/1/2018    Procedure: BILATERAL TOTAL MASTECTOMY WITH RIGHT SENTINEL LYMPH MARTINEZ BIOPSY;  Surgeon: Kwaku Gudino MD;  Location: [facility] MAIN OR;  Service: General   • OOPHORECTOMY Bilateral 1989   • VENOUS ACCESS DEVICE (PORT) INSERTION Left 9/14/2018    Procedure: INSERTION VENOUS ACCESS DEVICE;  Surgeon: Kwaku Gudino MD;  Location: [facility] OR;  Service: General      ONCOLOGIC HISTORY;  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  The patient had never had a previous mammogram and was referred for evaluation at which time she was found to have a 4.2 cm mass in the upper outer quadrant of the right breast at the 9:30 position associated with pleomorphic calcifications in the lower inner quadrant also felt to be suspicious left breast was benign.  Patient underwent ultrasound guided biopsy on 6/15/18 with the biopsy at the 9:30 position with clip placement showing invasive mammary carcinoma ductal type low-grade ER % MA 9800% HER-2 2+ negative by fish.  Patient also underwent biopsy at the 4:00 position which showed a microscopic focus of invasive ductal carcinoma felt to  be probably a lymphatic space involvement and was too small to do additional testing and a clip was placed.  Patient went on to have bilateral breast MRIs and was referred to Dr. Gudino MRIs confirmed 2 areas of abnormality at the breast-lower inner quadrant showed a large hematoma with a clip identified and there is extensive clumped abnormal enhancement beginning superior to the biopsy cavity and extending laterally to the entire upper half of the right breast into the upper outer quadrant measuring 1.2 x 1.4 cm and extending over 7 cm.  A biopsy clip was identified in this area the abnormal enhancement focally abutted the pectoralis muscle medially with no invasion through few borderline enlarged right axillary nodes with normal morphology no internal mammary adenopathy was noted left breast was benign.  Patient went on to have bilateral mastectomies the final path report showed 2 separate primaries in the breast associated with extensive high-grade DCIS measuring 6 cm  The lower inner quadrant lesion measured 1 cm was a grade 3 invasive ductal carcinoma ER 0 ME 0 HER-2 3+ and the lesion at the 9:30 position was a grade 1 invasive ductal carcinoma measuring 8 mm strongly ER/ME positive and HER-2 negative based on preop testing.  3 sentinel nodes were obtained 1 showed isolated tumor cells.  Margins showed  Positivity with DCIS in the inferior medial margin and was close to the deep margin (2mm) and inferior superficial margin(3mm).  Lymphovascular invasion was present.  Left breast is benign    Postoperatively she's done well and had her expanders filled and is here today to discuss adjuvant treatment.  Unfortunately she did not know about the testing on the second tumor at surgery and thought she had a grade 1 ER/ME positive HER-2 negative tumor and was very surprised when I told her the prior markers on the second tumor.    She is  0 para 0 menarche was at age 13 menopause at age 33 when she had a  hysterectomy and oophorectomy.  She's been on hormone replacement since age of 33 until her recent diagnosis .    Family history is positive for mother having colon cancer age 72 maternal aunt with breast cancer in her 60s and another maternal aunt with uterine cancer in her 70s.  She's had negative genetic testing but the extended panel is being done and is pending    We discussed treatment plans and I have scheduled her for Chemotherapy education with tentative plans for weekly Taxol Herceptin for 12 weeks followed by Herceptin for a year  We will do an Oncotype DX score on the hormone positive tumor which is low-grade unlikely to be high risk    We discussed whether or not radiation would be indicated and except for the fact that there is a positive margin with high-grade DCIS normally she would not need radiation but I recommended a consultation because of the margins and the high grade nature of the DCIS.    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  She is presented at the multidiscipline conference and everybody felt comfortable with weekly Taxol Herceptin for 12 weeks followed by Herceptin for a year and Oncotype testing on the second tumor for which she would receive an aromatase inhibitor for 5 years unless it was high risk which is very unlikely    She is here today  having had an echo which showed an adequate ejection fraction  Of 61% and she had a port placed and is here to start treatment.    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    Unfortunately the wrong specimen was sent for Oncotype testing and ER negative specimen was sent and I talked to the pathologist and they will send the correct specimen for Oncotype DX testing with no talked to the patient.    That he has no questions and is happy to finally get started with therapy.    Genetic testing results are negative using the [employer]  Panel    1/19  She is already having pain in her left knee and wants to see an orthopedic surgeon for this pain is pretty sharp and she is taking Celebrex to help deal with this    We discussed the side effects of Arimidex including hot flashes and joint pains but she is wanting to do this rather than tamoxifen because she does not like the side effect profile of tamoxifen    She's never had a bone density and we will order this also and make sure it is adequate    She has no significant neuropathy and she is happy to be off the Taxol-she tolerated the Herceptin alone well but wants to cut back on the Benadryl because it makes her too sleepy    She lost a lot of hair in the chronic for him because her [employer] cooling Did not fit well on the vertex of her skull    She reports mild blurry vision that started approximately after the 10th dose of Taxol and her ophthalmologist as she has cataracts that are affecting the macular part of her iron she needs to have surgeries sometime in the next month.  I told her that Herceptin does not cause this and all the  steroid premeds may have accelerated to cataract          Current Outpatient Medications on  File Prior to Visit   Medication Sig Dispense Refill   • chlorthalidone (HYGROTON) 25 MG tablet Take 25 mg by mouth Every Night.     • exemestane (AROMASIN) 25 MG chemo tablet Take 1 tablet by mouth Daily for 90 days. 90 tablet 3   • fexofenadine-pseudoephedrine (ALLEGRA-D 24) 180-240 MG per 24 hr tablet Take 1 tablet by mouth Daily.     • omeprazole-sodium bicarbonate (ZEGERID)  MG per capsule Take 1 capsule by mouth Every Night.     • raNITIdine (ZANTAC) 150 MG tablet Take 150 mg by mouth Daily.     • Turmeric 500 MG tablet Take 1,000 mg by mouth Daily.     • [DISCONTINUED] celecoxib (CeleBREX) 100 MG capsule Take 100 mg by mouth As Needed.       No current facility-administered medications on file prior to visit.         ALLERGIES:    Allergies   Allergen Reactions   • Moxifloxacin Anaphylaxis and Rash   • Aloxi [Palonosetron Hcl] Itching   • Latex Rash     Added based on information entered during log entry, please review and add reactions, type, and severity as needed        Social History     Socioeconomic History   • Marital status:      Spouse name: FLORINA Gross   • Number of children: 0   • Years of education: Graduate School   • Highest education level: Not on file   Occupational History   • Occupation: Physician     Employer: [employer]   Tobacco Use   • Smoking status: Never Smoker   • Smokeless tobacco: Never Used   Substance and Sexual Activity   • Alcohol use: Yes     Types: 1 Glasses of wine per week   • Drug use: No   • Sexual activity: Defer     Comment:         Family History   Problem Relation Age of Onset   • Colon cancer Mother 72   • Breast cancer Maternal Aunt 60   • Uterine cancer Maternal Aunt 75   • Stroke Father    • Malig Hyperthermia Neg Hx         Review of Systems   Constitutional: Positive for diaphoresis.   Gastrointestinal:        Chronic indigestion   Endocrine: Positive for heat intolerance.   Neurological: 1-rd3rdgrdrrdarddrderd numbness.    Left  "knee pain 6/10 taking Celebrex    Objective     Vitals:    06/21/19 0920   BP: 135/81   Pulse: 87   Resp: 16   Temp: 97.9 °F (36.6 °C)   SpO2: 99%   Weight: 60.5 kg (133 lb 6.4 oz)   Height: 162.6 cm (64.02\")   PainSc: 0-No pain     Current Status 6/21/2019   ECOG score 0       Physical Exam    GENERAL:  Well-developed, well-nourished in no acute distress.   SKIN:  Warm, dry without rashes, purpura or petechiae.  EYES:  Pupils equal, round and reactive to light.  EOMs intact.  Conjunctivae normal. Mild opacity of the right eye.   EARS:  Hearing intact.  NOSE:  Septum midline.  No excoriations or nasal discharge.  MOUTH:  Tongue is well-papillated; no stomatitis or ulcers.  Lips normal.  THROAT:  Oropharynx without lesions or exudates.  NECK:  Supple with good range of motion; no thyromegaly or masses, no JVD.  LYMPHATICS:  No cervical, supraclavicular, axillary or inguinal adenopathy.  CHEST:  Lungs clear to auscultation. Good airflow.  BREATS: Left breast is benign with an implant nipple intact the right breast has an implant in place   CARDIAC:  Regular rate and rhythm without murmurs, rubs or gallops. Normal S1,S2.  ABDOMEN:  Soft, nontender with no hepatosplenomegaly or masses.  EXTREMITIES:  No clubbing, cyanosis or edema.  No obvious fluid or warmth in the left knee  NEUROLOGICAL:  Cranial Nerves II-XII grossly intact.  No focal neurological deficits.  PSYCHIATRIC:  Normal affect and mood.        RECENT LABS:  Hematology WBC   Date Value Ref Range Status   06/21/2019 5.86 3.40 - 10.80 10*3/mm3 Final     RBC   Date Value Ref Range Status   06/21/2019 4.77 3.77 - 5.28 10*6/mm3 Final     Hemoglobin   Date Value Ref Range Status   06/21/2019 11.7 (L) 12.0 - 15.9 g/dL Final     Hematocrit   Date Value Ref Range Status   06/21/2019 37.9 34.0 - 46.6 % Final     Platelets   Date Value Ref Range Status   06/21/2019 306 140 - 450 10*3/mm3 Final        SYNOPTIC REPORT (Based on [facility] Cancer Case Summary, version January " 2018):                Procedure: Total mastectomy.               Specimen/tumor laterality: Right.                Tumor site: Lower inner quadrant and central/inferior breast.                 Tumor size (greastest dimension of largest invasive focus): 1 cm (lower inner                       quadrant tumor).               Histologic type: Invasive carcinoma of no special type (ductal, not otherwise specified).                 Histologic grade ([place] Histologic Score):                              Glandular (acinar)/tubular differentiation: Score 3.                             Nuclear pleomorphism: Score 3.                             Mitotic rate: Score 2.                            Overall grade: Grade 3 (score 8 of 9).                            NOTE: The above [place] Histologic Score applies to the largest and                                    highest grade tumor (lower inner quadrant tumor).  The tumor in the                                    central/inferior breast is intermediate grade (tubular score 3, nuclear                                    score 2, mitotic score 1).               Tumor focality: Multiple foci of invasive carcinoma.                             Number of foci: 2.                              Sizes of individual foci: 1 cm (lower inner quadrant tumor) and approximately                                    0.8 cm (central/inferior tumor).               Ductal carcinoma in situ (DCIS): Present, positive for extensive intraductal component.                            Size (extent) of DCIS: Approximately greater than 6 cm (estimated based                                    on gross and microscopic findings).                            Architectural pattern: Solid and cribriform.                            Nuclear grade: Grade III (high).                            Necrosis: Central comedonecrosis.               Lobular carcinoma in situ (LCIS): No LCIS in specimen.                Margins:                             Invasive carcinoma margins: Uninvolved by invasive carcinoma.                                          Distance from closest margin: 2 mm (deep margin).                             DCIS margins: Positive for DCIS (inferior medial margin [lower inner quadrant]).                                          Additional DCIS margins: DCIS extends to within 2 mm of deep margin                                                 and to within 3 mm of inferior superficial soft tissue margin.  Atypical                                                 ductal hyperplasia is focally present at the deep margin.               Regional lymph nodes: Involved by tumor cells.                             Number of lymph nodes with macrometastases: 0.                            Number of lymph nodes with micrometastases: 0.                            Number of lymph nodes with isolated tumor cells: 1.                            Number of lymph nodes examined: 3.                             Number of sentinel lymph nodes examined: 3.                Treatment effect: No known presurgical therapy.                Lymphovascular invasion: Present.               Dermal lymphovascular invasion: Not identified.                Pathologic stage classification:                             TNM descriptors: m (multiple foci of invasive carcinoma).                             Primary tumor: pT1b.                            Regional lymph nodes: pN0(i+)(sn).                             Distant metastasis: Not applicable.                Additional pathologic findings:                              Ductal hyperplasia of the usual type.                              Fibroinflammatory changes consistent with sites of prior biopsy (2 prior biopsy                                    sites).                 Ancillary studies:ER CA negative HER-2 positive lower inner quadrant tumor    Echocardiogram  Reading physician: Keily Jensen,  MD Ordering physician: Cecy Treviño MD Study date: 12/17/18     Study Description     A two-dimensional transthoracic echocardiogram with color flow and Doppler was performed. The study is technically difficult for diagnosis.Verbal consent was obtained from the patient to use Definity contrast in order to optimize the study. The use of Definity was indicated as two or more contiguous segments of the left ventricular endocardial border were unable to be adequately visualized by standard imaging methods. 1.3 mL of mechanically activated Definity was mixed with 8.7 mL of normal saline. A total of 2 mL of the resulting Definity solution was administered, and the remaining contrast was wasted and discarded.   No adverse reaction to contrast was noted.   9/14/18 EF 61% No GLS     DEXA BONE DENSITY AXIAL  FINDINGS: Average lumbar bone mineral density 1.0 g/cm sq correlating to  a T value of -0.2 and a Z score of 1.4. Right femoral neck bone mineral  density 0.77 g/cm sq correlating to a T value of -0.7 and a Z score of  0.5. Left femoral neck bone mineral density 0.79 g/cm sq correlating to  a T value of -0.6 and a Z score of 0.6.     CONCLUSION: Normal bone mineral density.     This report was finalized on 3/18/2019      Echocardiogram  Study Description     A two-dimensional transthoracic echocardiogram with color flow and Doppler was performed.   The study is technically difficult for diagnosis. The quality of the study is limited due to poor acoustic windows related to breast implants. Verbal consent was obtained from the patient to use Definity contrast in order to optimize the study. Note also that contrast was used to enhance ejection fraction reproducability of the LV ejection fraction on serial echocardiograms.  The use of Definity was indicated as two or more contiguous segments of the left ventricular endocardial border were unable to be adequately visualized by standard imaging methods. 1.3 mL of  mechanically activated Definity was mixed with 8.7 mL of normal saline. A total of 2 mL of the resulting Definity solution was administered, and the remaining contrast was wasted and discarded.   No adverse reaction to contrast was noted.   No GLS obtained due to breast implants and spacers  12/17/18  EF=57%    09/14/18  EF=61%.   Electronically signed by Keily Jensen MD on 3/15/19 at 1713 EDT       FINDINGS: Average lumbar bone mineral density 1.0 g/cm sq correlating to  a T value of -0.2 and a Z score of 1.4. Right femoral neck bone mineral  density 0.77 g/cm sq correlating to a T value of -0.7 and a Z score of  0.5. Left femoral neck bone mineral density 0.79 g/cm sq correlating to  a T value of -0.6 and a Z score of 0.6.     CONCLUSION: Normal bone mineral density.     6/7/19  · Estimated EF = 67%.  · Left ventricular systolic function is normal.  · No change from prior study              Assessment/Plan   1.  Multifocal right breast cancer T1 BN(ic)-isolated tumor cells medial tumor being grade 3 ER/DE negative HER-2 3+ lateral tumor being T1b N0 grade 1 ER/DE positive HER-2 2+ negative fish Oncotype score of 4-post bilateral mastectomy nipple sparing on the left  ·   Positive family history of multiple malignancies genetic testing negative  · Weekly Taxol Herceptin started 9/28/2018 ×12 planned followed by aromatase inhibitor for her second tumor  · Arimidex started in 2/19-intolerant switch to Aromasin in 4/19    2.  Wound dehiscence no signs of infection resutured-expander removed in 3/19 with permanent implant placed    3. Blurry vision.  Cataract surgery in 3 weeks    4. Left knee pain referral to orthopedics planned -degenerative disease of the hip aggravated by Arimidex    Plan  1.  Herceptin # 10/13 maintenance dose today   2.  Aromasin 25 mg to continue  as adjuvant therapy for her second tumor   3.. Cataract surgery after completion of Herceptin  4.  Return in 9 weeks to see me with Herceptin in 3  weeks in 6 weeks

## 2019-06-24 NOTE — PROGRESS NOTES
Subjective     REASON FOR CONSULTATION:    1.Multifocal right breast cancer post bilateral mastectomies- T1b N0+-10:00 tumor 8 mm grade 1 ER/IL positive HER-2 negative Oncotype 4  2.  5:00 tumor right breast ER/IL negative HER-2 positive 10 mm in size  3.  Negative genetic testing  4.  Adequate echo weekly Taxol Herceptin started on 9/21/18                          Referring physician Kwaku Gudino M.D.                            History of Present Illness : This is a 62-year-old lady with a multifocal right breast cancer T1b N0+ ER/IL negative HER-2 positive and the second T1b ER/IL positive HER-2 negative tumor here for maintenance Herceptin.  She was started on Arimidex  for her second tumor which was a low risk hormone positive tumor.  But after 2 months the pain in her hip and shin became unbearable and we switched to Aromasin which she has done very well overall    She saw the orthopedic doctor who thought she needed her left hip replaced and she does not want to do this at the time     her ejection fraction is stable at 67% on  6/7/2019 and the echo report was reviewed    Her bone density was reviewed and shows normal bone density     She denies headaches, speech disturbances, photophobia, new neuropathy.     Her appetite is good. Her bowels and bladder are working well.     She needs cataract surgery in the other eye and is going to wait till she finishes her Herceptin to start this    Past Medical History:   Diagnosis Date   • Environmental allergies    • GERD (gastroesophageal reflux disease)    • History of foreign travel     [place]   • Hypertension    • Malignant neoplasm of overlapping sites of right breast in female, estrogen receptor positive (CMS/HCC) 7/18/2018        Past Surgical History:   Procedure Laterality Date   • BREAST BIOPSY Right 2018    Malignant   • BREAST RECONSTRUCTION Bilateral 8/1/2018    Procedure: BILATERAL BREAST TISSUE EXPANDER INSERTION WITH ALLODERM;  Surgeon:  Waterhouse, Maurine, MD;  Location: [facility] MAIN OR;  Service: Plastics   • BREAST TISSUE EXPANDER REMOVAL INSERTION OF IMPLANT Bilateral 3/22/2019    Procedure: BILATERAL BREAST TISSUE EXPANDER REMOVAL INSERTION OF BILATERAL BREAST IMPLANT;  Surgeon: Waterhouse, Maurine, MD;  Location: [facility] OR [facility];  Service: Plastics   • COLONOSCOPY N/A 3/24/2017    Procedure: COLONOSCOPY TO CECUM AND TI;  Surgeon: Aleksander Bella MD;  Location: [facility] ENDOSCOPY;  Service:    • ENDOSCOPY N/A 3/24/2017    Procedure: ESOPHAGOGASTRODUODENOSCOPY;  Surgeon: Aleksander Bella MD;  Location: [facility] ENDOSCOPY;  Service:    • EYE SURGERY Right 05/01/2019    lens transplant   • HYSTERECTOMY  1989   • MASTECTOMY W/ SENTINEL NODE BIOPSY Bilateral 8/1/2018    Procedure: BILATERAL TOTAL MASTECTOMY WITH RIGHT SENTINEL LYMPH MARTINEZ BIOPSY;  Surgeon: Kwaku Gudino MD;  Location: 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  The patient had never had a previous mammogram and was referred for evaluation at which time she was found to have a 4.2 cm mass in the upper outer quadrant of the right breast at the 9:30 position associated with pleomorphic calcifications in the lower inner quadrant also felt to be suspicious left breast was benign.  Patient underwent ultrasound guided biopsy on 6/15/18 with the biopsy at the 9:30 position with clip placement showing invasive mammary carcinoma ductal type low-grade ER % AR 9800% HER-2 2+ negative by fish.  Patient also underwent biopsy at the 4:00 position which showed a microscopic focus of invasive ductal carcinoma felt to  be probably a lymphatic space involvement and was too small to do additional testing and a clip was placed.  Patient went on to have bilateral breast MRIs and was referred to Dr. Gudino MRIs confirmed 2 areas of abnormality at the breast-lower inner quadrant showed a large hematoma with a clip identified and there is extensive clumped abnormal enhancement beginning superior to the biopsy cavity and extending laterally to the entire upper half of the right breast into the upper outer quadrant measuring 1.2 x 1.4 cm and extending over 7 cm.  A biopsy clip was identified in this area the abnormal enhancement focally abutted the pectoralis muscle medially with no invasion through few borderline enlarged right axillary nodes with normal morphology no internal mammary adenopathy was noted left breast was benign.  Patient went on to have bilateral mastectomies the final path report showed 2 separate primaries in the breast associated with extensive high-grade DCIS measuring 6 cm  The lower inner quadrant lesion measured 1 cm was a grade 3 invasive ductal carcinoma ER 0 LA 0 HER-2 3+ and the lesion at the 9:30 position was a grade 1 invasive ductal carcinoma measuring 8 mm strongly ER/LA positive and HER-2 negative based on preop testing.  3 sentinel nodes were obtained 1 showed isolated tumor cells.  Margins showed  Positivity with DCIS in the inferior medial margin and was close to the deep margin (2mm) and inferior superficial margin(3mm).  Lymphovascular invasion was present.  Left breast is benign    Postoperatively she's done well and had her expanders filled and is here today to discuss adjuvant treatment.  Unfortunately she did not know about the testing on the second tumor at surgery and thought she had a grade 1 ER/LA positive HER-2 negative tumor and was very surprised when I told her the prior markers on the second tumor.    She is  0 para 0 menarche was at age 13 menopause at age 33 when she had a  hysterectomy and oophorectomy.  She's been on hormone replacement since age of 33 until her recent diagnosis .    Family history is positive for mother having colon cancer age 72 maternal aunt with breast cancer in her 60s and another maternal aunt with uterine cancer in her 70s.  She's had negative genetic testing but the extended panel is being done and is pending    We discussed treatment plans and I have scheduled her for Chemotherapy education with tentative plans for weekly Taxol Herceptin for 12 weeks followed by Herceptin for a year  We will do an Oncotype DX score on the hormone positive tumor which is low-grade unlikely to be high risk    We discussed whether or not radiation would be indicated and except for the fact that there is a positive margin with high-grade DCIS normally she would not need radiation but I recommended a consultation because of the margins and the high grade nature of the DCIS.    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  She is presented at the multidiscipline conference and everybody felt comfortable with weekly Taxol Herceptin for 12 weeks followed by Herceptin for a year and Oncotype testing on the second tumor for which she would receive an aromatase inhibitor for 5 years unless it was high risk which is very unlikely    She is here today  having had an echo which showed an adequate ejection fraction  Of 61% and she had a port placed and is here to start treatment.    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    Unfortunately the wrong specimen was sent for Oncotype testing and ER negative specimen was sent and I talked to the pathologist and they will send the correct specimen for Oncotype DX testing with no talked to the patient.    That he has no questions and is happy to finally get started with therapy.    Genetic testing results are negative using the EverTrueITAInstilling Values  Panel    1/19  She is already having pain in her left knee and wants to see an orthopedic surgeon for this pain is pretty sharp and she is taking Celebrex to help deal with this    We discussed the side effects of Arimidex including hot flashes and joint pains but she is wanting to do this rather than tamoxifen because she does not like the side effect profile of tamoxifen    She's never had a bone density and we will order this also and make sure it is adequate    She has no significant neuropathy and she is happy to be off the Taxol-she tolerated the Herceptin alone well but wants to cut back on the Benadryl because it makes her too sleepy    She lost a lot of hair in the chronic for him because her [employer] cooling Did not fit well on the vertex of her skull    She reports mild blurry vision that started approximately after the 10th dose of Taxol and her ophthalmologist as she has cataracts that are affecting the macular part of her iron she needs to have surgeries sometime in the next month.  I told her that Herceptin does not cause this and all the  steroid premeds may have accelerated to cataract          Current Outpatient Medications on  File Prior to Visit   Medication Sig Dispense Refill   • chlorthalidone (HYGROTON) 25 MG tablet Take 25 mg by mouth Every Night.     • exemestane (AROMASIN) 25 MG chemo tablet Take 1 tablet by mouth Daily for 90 days. 90 tablet 3   • fexofenadine-pseudoephedrine (ALLEGRA-D 24) 180-240 MG per 24 hr tablet Take 1 tablet by mouth Daily.     • omeprazole-sodium bicarbonate (ZEGERID)  MG per capsule Take 1 capsule by mouth Every Night.     • raNITIdine (ZANTAC) 150 MG tablet Take 150 mg by mouth Daily.     • Turmeric 500 MG tablet Take 1,000 mg by mouth Daily.       No current facility-administered medications on file prior to visit.         ALLERGIES:    Allergies   Allergen Reactions   • Moxifloxacin Anaphylaxis and Rash   • Aloxi [Palonosetron Hcl] Itching   • Latex Rash     Added based on information entered during log entry, please review and add reactions, type, and severity as needed        Social History     Socioeconomic History   • Marital status:      Spouse name: FLORINA MARTINEZ Renato   • Number of children: 0   • Years of education: Graduate School   • Highest education level: Not on file   Occupational History   • Occupation: Physician     Employer: [employer]   Tobacco Use   • Smoking status: Never Smoker   • Smokeless tobacco: Never Used   Substance and Sexual Activity   • Alcohol use: Yes     Types: 1 Glasses of wine per week   • Drug use: No   • Sexual activity: Defer     Comment:         Family History   Problem Relation Age of Onset   • Colon cancer Mother 72   • Breast cancer Maternal Aunt 60   • Uterine cancer Maternal Aunt 75   • Stroke Father    • Malig Hyperthermia Neg Hx         Review of Systems   Constitutional: Positive for diaphoresis.   Gastrointestinal:        Chronic indigestion   Endocrine: Positive for heat intolerance.   Neurological: 1-st1stgstrstastdstest numbness.    Left knee pain 6/10 taking Celebrex    Objective     Vitals:    06/21/19 0920   BP: 135/81  "  Pulse: 87   Resp: 16   Temp: 97.9 °F (36.6 °C)   SpO2: 99%   Weight: 60.5 kg (133 lb 6.4 oz)   Height: 162.6 cm (64.02\")   PainSc: 0-No pain     Current Status 6/21/2019   ECOG score 0       Physical Exam    GENERAL:  Well-developed, well-nourished in no acute distress.   SKIN:  Warm, dry without rashes, purpura or petechiae.  EYES:  Pupils equal, round and reactive to light.  EOMs intact.  Conjunctivae normal. Mild opacity of the right eye.   EARS:  Hearing intact.  NOSE:  Septum midline.  No excoriations or nasal discharge.  MOUTH:  Tongue is well-papillated; no stomatitis or ulcers.  Lips normal.  THROAT:  Oropharynx without lesions or exudates.  NECK:  Supple with good range of motion; no thyromegaly or masses, no JVD.  LYMPHATICS:  No cervical, supraclavicular, axillary or inguinal adenopathy.  CHEST:  Lungs clear to auscultation. Good airflow.  BREATS: Left breast is benign with an implant nipple intact the right breast has an implant in place   CARDIAC:  Regular rate and rhythm without murmurs, rubs or gallops. Normal S1,S2.  ABDOMEN:  Soft, nontender with no hepatosplenomegaly or masses.  EXTREMITIES:  No clubbing, cyanosis or edema.  No obvious fluid or warmth in the left knee  NEUROLOGICAL:  Cranial Nerves II-XII grossly intact.  No focal neurological deficits.  PSYCHIATRIC:  Normal affect and mood.        RECENT LABS:  Hematology WBC   Date Value Ref Range Status   06/21/2019 5.86 3.40 - 10.80 10*3/mm3 Final     RBC   Date Value Ref Range Status   06/21/2019 4.77 3.77 - 5.28 10*6/mm3 Final     Hemoglobin   Date Value Ref Range Status   06/21/2019 11.7 (L) 12.0 - 15.9 g/dL Final     Hematocrit   Date Value Ref Range Status   06/21/2019 37.9 34.0 - 46.6 % Final     Platelets   Date Value Ref Range Status   06/21/2019 306 140 - 450 10*3/mm3 Final        SYNOPTIC REPORT (Based on [facility] Cancer Case Summary, version January 2018):                Procedure: Total mastectomy.               Specimen/tumor " laterality: Right.                Tumor site: Lower inner quadrant and central/inferior breast.                 Tumor size (greastest dimension of largest invasive focus): 1 cm (lower inner                       quadrant tumor).               Histologic type: Invasive carcinoma of no special type (ductal, not otherwise specified).                 Histologic grade ([place] Histologic Score):                              Glandular (acinar)/tubular differentiation: Score 3.                             Nuclear pleomorphism: Score 3.                             Mitotic rate: Score 2.                            Overall grade: Grade 3 (score 8 of 9).                            NOTE: The above [place] Histologic Score applies to the largest and                                    highest grade tumor (lower inner quadrant tumor).  The tumor in the                                    central/inferior breast is intermediate grade (tubular score 3, nuclear                                    score 2, mitotic score 1).               Tumor focality: Multiple foci of invasive carcinoma.                             Number of foci: 2.                              Sizes of individual foci: 1 cm (lower inner quadrant tumor) and approximately                                    0.8 cm (central/inferior tumor).               Ductal carcinoma in situ (DCIS): Present, positive for extensive intraductal component.                            Size (extent) of DCIS: Approximately greater than 6 cm (estimated based                                    on gross and microscopic findings).                            Architectural pattern: Solid and cribriform.                            Nuclear grade: Grade III (high).                            Necrosis: Central comedonecrosis.               Lobular carcinoma in situ (LCIS): No LCIS in specimen.               Margins:                             Invasive carcinoma margins: Uninvolved by invasive  carcinoma.                                          Distance from closest margin: 2 mm (deep margin).                             DCIS margins: Positive for DCIS (inferior medial margin [lower inner quadrant]).                                          Additional DCIS margins: DCIS extends to within 2 mm of deep margin                                                 and to within 3 mm of inferior superficial soft tissue margin.  Atypical                                                 ductal hyperplasia is focally present at the deep margin.               Regional lymph nodes: Involved by tumor cells.                             Number of lymph nodes with macrometastases: 0.                            Number of lymph nodes with micrometastases: 0.                            Number of lymph nodes with isolated tumor cells: 1.                            Number of lymph nodes examined: 3.                             Number of sentinel lymph nodes examined: 3.                Treatment effect: No known presurgical therapy.                Lymphovascular invasion: Present.               Dermal lymphovascular invasion: Not identified.                Pathologic stage classification:                             TNM descriptors: m (multiple foci of invasive carcinoma).                             Primary tumor: pT1b.                            Regional lymph nodes: pN0(i+)(sn).                             Distant metastasis: Not applicable.                Additional pathologic findings:                              Ductal hyperplasia of the usual type.                              Fibroinflammatory changes consistent with sites of prior biopsy (2 prior biopsy                                    sites).                 Ancillary studies:ER ND negative HER-2 positive lower inner quadrant tumor    Echocardiogram  Reading physician: Keily Jensen MD Ordering physician: Cecy Treviño MD Study date: 12/17/18     Study Description      A two-dimensional transthoracic echocardiogram with color flow and Doppler was performed. The study is technically difficult for diagnosis.Verbal consent was obtained from the patient to use Definity contrast in order to optimize the study. The use of Definity was indicated as two or more contiguous segments of the left ventricular endocardial border were unable to be adequately visualized by standard imaging methods. 1.3 mL of mechanically activated Definity was mixed with 8.7 mL of normal saline. A total of 2 mL of the resulting Definity solution was administered, and the remaining contrast was wasted and discarded.   No adverse reaction to contrast was noted.   9/14/18 EF 61% No GLS     DEXA BONE DENSITY AXIAL  FINDINGS: Average lumbar bone mineral density 1.0 g/cm sq correlating to  a T value of -0.2 and a Z score of 1.4. Right femoral neck bone mineral  density 0.77 g/cm sq correlating to a T value of -0.7 and a Z score of  0.5. Left femoral neck bone mineral density 0.79 g/cm sq correlating to  a T value of -0.6 and a Z score of 0.6.     CONCLUSION: Normal bone mineral density.     This report was finalized on 3/18/2019      Echocardiogram  Study Description     A two-dimensional transthoracic echocardiogram with color flow and Doppler was performed.   The study is technically difficult for diagnosis. The quality of the study is limited due to poor acoustic windows related to breast implants. Verbal consent was obtained from the patient to use Definity contrast in order to optimize the study. Note also that contrast was used to enhance ejection fraction reproducability of the LV ejection fraction on serial echocardiograms.  The use of Definity was indicated as two or more contiguous segments of the left ventricular endocardial border were unable to be adequately visualized by standard imaging methods. 1.3 mL of mechanically activated Definity was mixed with 8.7 mL of normal saline. A total of 2 mL of the  resulting Definity solution was administered, and the remaining contrast was wasted and discarded.   No adverse reaction to contrast was noted.   No GLS obtained due to breast implants and spacers  12/17/18  EF=57%    09/14/18  EF=61%.   Electronically signed by Keily Jensen MD on 3/15/19 at 1713 EDT       FINDINGS: Average lumbar bone mineral density 1.0 g/cm sq correlating to  a T value of -0.2 and a Z score of 1.4. Right femoral neck bone mineral  density 0.77 g/cm sq correlating to a T value of -0.7 and a Z score of  0.5. Left femoral neck bone mineral density 0.79 g/cm sq correlating to  a T value of -0.6 and a Z score of 0.6.     CONCLUSION: Normal bone mineral density.     6/7/19  · Estimated EF = 67%.  · Left ventricular systolic function is normal.  · No change from prior study              Assessment/Plan   1.  Multifocal right breast cancer T1 BN(ic)-isolated tumor cells medial tumor being grade 3 ER/IA negative HER-2 3+ lateral tumor being T1b N0 grade 1 ER/IA positive HER-2 2+ negative fish Oncotype score of 4-post bilateral mastectomy nipple sparing on the left  ·   Positive family history of multiple malignancies genetic testing negative  · Weekly Taxol Herceptin started 9/28/2018 ×12 planned followed by aromatase inhibitor for her second tumor  · Arimidex started in 2/19-intolerant switch to Aromasin in 4/19    2.  Wound dehiscence no signs of infection resutured-expander removed in 3/19 with permanent implant placed    3. Blurry vision.  Cataract surgery in 3 weeks    4. Left knee pain referral to orthopedics planned -degenerative disease of the hip aggravated by Arimidex    Plan  1.  Herceptin # 10/13 maintenance dose today   2.  Aromasin 25 mg to continue  as adjuvant therapy for her second tumor   3.. Cataract surgery after completion of Herceptin  4.  Return in 9 weeks to see me with Herceptin in 3 weeks in 6 weeks

## 2019-07-12 ENCOUNTER — INFUSION (OUTPATIENT)
Dept: ONCOLOGY | Facility: HOSPITAL | Age: 64
End: 2019-07-12

## 2019-07-12 VITALS
HEART RATE: 80 BPM | SYSTOLIC BLOOD PRESSURE: 154 MMHG | TEMPERATURE: 98 F | RESPIRATION RATE: 20 BRPM | OXYGEN SATURATION: 100 % | BODY MASS INDEX: 22.84 KG/M2 | HEIGHT: 64 IN | WEIGHT: 133.8 LBS | DIASTOLIC BLOOD PRESSURE: 87 MMHG

## 2019-07-12 DIAGNOSIS — C50.811 MALIGNANT NEOPLASM OF OVERLAPPING SITES OF RIGHT BREAST IN FEMALE, ESTROGEN RECEPTOR NEGATIVE (HCC): Primary | ICD-10-CM

## 2019-07-12 DIAGNOSIS — Z79.899 ENCOUNTER FOR LONG-TERM (CURRENT) USE OF HIGH-RISK MEDICATION: ICD-10-CM

## 2019-07-12 DIAGNOSIS — Z17.1 MALIGNANT NEOPLASM OF OVERLAPPING SITES OF RIGHT BREAST IN FEMALE, ESTROGEN RECEPTOR NEGATIVE (HCC): Primary | ICD-10-CM

## 2019-07-12 LAB
BASOPHILS # BLD AUTO: 0.05 10*3/MM3 (ref 0–0.2)
BASOPHILS NFR BLD AUTO: 0.9 % (ref 0–1.5)
DEPRECATED RDW RBC AUTO: 45.9 FL (ref 37–54)
EOSINOPHIL # BLD AUTO: 0.13 10*3/MM3 (ref 0–0.4)
EOSINOPHIL NFR BLD AUTO: 2.3 % (ref 0.3–6.2)
ERYTHROCYTE [DISTWIDTH] IN BLOOD BY AUTOMATED COUNT: 16.1 % (ref 12.3–15.4)
HCT VFR BLD AUTO: 38.9 % (ref 34–46.6)
HGB BLD-MCNC: 12 G/DL (ref 12–15.9)
IMM GRANULOCYTES # BLD AUTO: 0.02 10*3/MM3 (ref 0–0.05)
IMM GRANULOCYTES NFR BLD AUTO: 0.3 % (ref 0–0.5)
LYMPHOCYTES # BLD AUTO: 1.68 10*3/MM3 (ref 0.7–3.1)
LYMPHOCYTES NFR BLD AUTO: 29.3 % (ref 19.6–45.3)
MCH RBC QN AUTO: 24.6 PG (ref 26.6–33)
MCHC RBC AUTO-ENTMCNC: 30.8 G/DL (ref 31.5–35.7)
MCV RBC AUTO: 79.7 FL (ref 79–97)
MONOCYTES # BLD AUTO: 0.58 10*3/MM3 (ref 0.1–0.9)
MONOCYTES NFR BLD AUTO: 10.1 % (ref 5–12)
NEUTROPHILS # BLD AUTO: 3.27 10*3/MM3 (ref 1.7–7)
NEUTROPHILS NFR BLD AUTO: 57.1 % (ref 42.7–76)
NRBC BLD AUTO-RTO: 0 /100 WBC (ref 0–0.2)
PLATELET # BLD AUTO: 290 10*3/MM3 (ref 140–450)
PMV BLD AUTO: 8.7 FL (ref 6–12)
RBC # BLD AUTO: 4.88 10*6/MM3 (ref 3.77–5.28)
WBC NRBC COR # BLD: 5.73 10*3/MM3 (ref 3.4–10.8)

## 2019-07-12 PROCEDURE — 85025 COMPLETE CBC W/AUTO DIFF WBC: CPT | Performed by: INTERNAL MEDICINE

## 2019-07-12 PROCEDURE — 25010000002 TRASTUZUMAB PER 10 MG: Performed by: INTERNAL MEDICINE

## 2019-07-12 PROCEDURE — 96413 CHEMO IV INFUSION 1 HR: CPT | Performed by: INTERNAL MEDICINE

## 2019-07-12 RX ORDER — SODIUM CHLORIDE 0.9 % (FLUSH) 0.9 %
10 SYRINGE (ML) INJECTION AS NEEDED
Status: CANCELLED | OUTPATIENT
Start: 2019-07-12

## 2019-07-12 RX ORDER — SODIUM CHLORIDE 0.9 % (FLUSH) 0.9 %
10 SYRINGE (ML) INJECTION AS NEEDED
Status: DISCONTINUED | OUTPATIENT
Start: 2019-07-12 | End: 2019-07-12 | Stop reason: HOSPADM

## 2019-07-12 RX ORDER — SODIUM CHLORIDE 9 MG/ML
250 INJECTION, SOLUTION INTRAVENOUS ONCE
Status: COMPLETED | OUTPATIENT
Start: 2019-07-12 | End: 2019-07-12

## 2019-07-12 RX ADMIN — SODIUM CHLORIDE, PRESERVATIVE FREE 10 ML: 5 INJECTION INTRAVENOUS at 11:33

## 2019-07-12 RX ADMIN — SODIUM CHLORIDE 250 ML: 9 INJECTION, SOLUTION INTRAVENOUS at 10:45

## 2019-07-12 RX ADMIN — TRASTUZUMAB 380 MG: 150 INJECTION, POWDER, LYOPHILIZED, FOR SOLUTION INTRAVENOUS at 10:53

## 2019-07-12 RX ADMIN — Medication 500 UNITS: at 11:33

## 2019-08-02 ENCOUNTER — INFUSION (OUTPATIENT)
Dept: ONCOLOGY | Facility: HOSPITAL | Age: 64
End: 2019-08-02

## 2019-08-02 VITALS
DIASTOLIC BLOOD PRESSURE: 83 MMHG | SYSTOLIC BLOOD PRESSURE: 148 MMHG | HEART RATE: 86 BPM | WEIGHT: 133.2 LBS | BODY MASS INDEX: 22.85 KG/M2 | OXYGEN SATURATION: 100 % | TEMPERATURE: 97.7 F

## 2019-08-02 DIAGNOSIS — Z17.1 MALIGNANT NEOPLASM OF OVERLAPPING SITES OF RIGHT BREAST IN FEMALE, ESTROGEN RECEPTOR NEGATIVE (HCC): Primary | ICD-10-CM

## 2019-08-02 DIAGNOSIS — Z79.899 ENCOUNTER FOR LONG-TERM (CURRENT) USE OF HIGH-RISK MEDICATION: ICD-10-CM

## 2019-08-02 DIAGNOSIS — C50.811 MALIGNANT NEOPLASM OF OVERLAPPING SITES OF RIGHT BREAST IN FEMALE, ESTROGEN RECEPTOR NEGATIVE (HCC): Primary | ICD-10-CM

## 2019-08-02 LAB
BASOPHILS # BLD AUTO: 0.06 10*3/MM3 (ref 0–0.2)
BASOPHILS NFR BLD AUTO: 1 % (ref 0–1.5)
DEPRECATED RDW RBC AUTO: 46.4 FL (ref 37–54)
EOSINOPHIL # BLD AUTO: 0.16 10*3/MM3 (ref 0–0.4)
EOSINOPHIL NFR BLD AUTO: 2.8 % (ref 0.3–6.2)
ERYTHROCYTE [DISTWIDTH] IN BLOOD BY AUTOMATED COUNT: 15.9 % (ref 12.3–15.4)
HCT VFR BLD AUTO: 39.3 % (ref 34–46.6)
HGB BLD-MCNC: 12.2 G/DL (ref 12–15.9)
IMM GRANULOCYTES # BLD AUTO: 0.01 10*3/MM3 (ref 0–0.05)
IMM GRANULOCYTES NFR BLD AUTO: 0.2 % (ref 0–0.5)
LYMPHOCYTES # BLD AUTO: 1.66 10*3/MM3 (ref 0.7–3.1)
LYMPHOCYTES NFR BLD AUTO: 28.6 % (ref 19.6–45.3)
MCH RBC QN AUTO: 24.9 PG (ref 26.6–33)
MCHC RBC AUTO-ENTMCNC: 31 G/DL (ref 31.5–35.7)
MCV RBC AUTO: 80.4 FL (ref 79–97)
MONOCYTES # BLD AUTO: 0.57 10*3/MM3 (ref 0.1–0.9)
MONOCYTES NFR BLD AUTO: 9.8 % (ref 5–12)
NEUTROPHILS # BLD AUTO: 3.35 10*3/MM3 (ref 1.7–7)
NEUTROPHILS NFR BLD AUTO: 57.6 % (ref 42.7–76)
NRBC BLD AUTO-RTO: 0 /100 WBC (ref 0–0.2)
PLATELET # BLD AUTO: 302 10*3/MM3 (ref 140–450)
PMV BLD AUTO: 8.4 FL (ref 6–12)
RBC # BLD AUTO: 4.89 10*6/MM3 (ref 3.77–5.28)
WBC NRBC COR # BLD: 5.81 10*3/MM3 (ref 3.4–10.8)

## 2019-08-02 PROCEDURE — 96413 CHEMO IV INFUSION 1 HR: CPT | Performed by: INTERNAL MEDICINE

## 2019-08-02 PROCEDURE — 85025 COMPLETE CBC W/AUTO DIFF WBC: CPT | Performed by: INTERNAL MEDICINE

## 2019-08-02 PROCEDURE — 25010000002 TRASTUZUMAB PER 10 MG: Performed by: INTERNAL MEDICINE

## 2019-08-02 RX ORDER — SODIUM CHLORIDE 0.9 % (FLUSH) 0.9 %
10 SYRINGE (ML) INJECTION AS NEEDED
Status: CANCELLED | OUTPATIENT
Start: 2019-08-02

## 2019-08-02 RX ORDER — SODIUM CHLORIDE 9 MG/ML
250 INJECTION, SOLUTION INTRAVENOUS ONCE
Status: COMPLETED | OUTPATIENT
Start: 2019-08-02 | End: 2019-08-02

## 2019-08-02 RX ADMIN — SODIUM CHLORIDE 250 ML: 9 INJECTION, SOLUTION INTRAVENOUS at 10:42

## 2019-08-02 RX ADMIN — TRASTUZUMAB 380 MG: 150 INJECTION, POWDER, LYOPHILIZED, FOR SOLUTION INTRAVENOUS at 10:43

## 2019-08-02 RX ADMIN — Medication 500 UNITS: at 11:15

## 2019-08-23 ENCOUNTER — OFFICE VISIT (OUTPATIENT)
Dept: ONCOLOGY | Facility: CLINIC | Age: 64
End: 2019-08-23

## 2019-08-23 ENCOUNTER — INFUSION (OUTPATIENT)
Dept: ONCOLOGY | Facility: HOSPITAL | Age: 64
End: 2019-08-23

## 2019-08-23 VITALS
WEIGHT: 134.6 LBS | HEART RATE: 85 BPM | SYSTOLIC BLOOD PRESSURE: 116 MMHG | DIASTOLIC BLOOD PRESSURE: 73 MMHG | OXYGEN SATURATION: 98 % | HEIGHT: 64 IN | RESPIRATION RATE: 16 BRPM | BODY MASS INDEX: 22.98 KG/M2 | TEMPERATURE: 97.8 F

## 2019-08-23 DIAGNOSIS — C50.811 MALIGNANT NEOPLASM OF OVERLAPPING SITES OF RIGHT BREAST IN FEMALE, ESTROGEN RECEPTOR NEGATIVE (HCC): ICD-10-CM

## 2019-08-23 DIAGNOSIS — Z17.1 MALIGNANT NEOPLASM OF OVERLAPPING SITES OF RIGHT BREAST IN FEMALE, ESTROGEN RECEPTOR NEGATIVE (HCC): ICD-10-CM

## 2019-08-23 DIAGNOSIS — Z17.1 MALIGNANT NEOPLASM OF OVERLAPPING SITES OF RIGHT BREAST IN FEMALE, ESTROGEN RECEPTOR NEGATIVE (HCC): Primary | ICD-10-CM

## 2019-08-23 DIAGNOSIS — C50.811 MALIGNANT NEOPLASM OF OVERLAPPING SITES OF RIGHT BREAST IN FEMALE, ESTROGEN RECEPTOR NEGATIVE (HCC): Primary | ICD-10-CM

## 2019-08-23 DIAGNOSIS — Z17.0 MALIGNANT NEOPLASM OF OVERLAPPING SITES OF RIGHT BREAST IN FEMALE, ESTROGEN RECEPTOR POSITIVE (HCC): ICD-10-CM

## 2019-08-23 DIAGNOSIS — Z79.899 ENCOUNTER FOR LONG-TERM (CURRENT) USE OF HIGH-RISK MEDICATION: Primary | ICD-10-CM

## 2019-08-23 DIAGNOSIS — C50.811 MALIGNANT NEOPLASM OF OVERLAPPING SITES OF RIGHT BREAST IN FEMALE, ESTROGEN RECEPTOR POSITIVE (HCC): ICD-10-CM

## 2019-08-23 LAB
BASOPHILS # BLD AUTO: 0.05 10*3/MM3 (ref 0–0.2)
BASOPHILS NFR BLD AUTO: 0.8 % (ref 0–1.5)
DEPRECATED RDW RBC AUTO: 44.7 FL (ref 37–54)
EOSINOPHIL # BLD AUTO: 0.14 10*3/MM3 (ref 0–0.4)
EOSINOPHIL NFR BLD AUTO: 2.4 % (ref 0.3–6.2)
ERYTHROCYTE [DISTWIDTH] IN BLOOD BY AUTOMATED COUNT: 15.7 % (ref 12.3–15.4)
HCT VFR BLD AUTO: 39.2 % (ref 34–46.6)
HGB BLD-MCNC: 12.1 G/DL (ref 12–15.9)
IMM GRANULOCYTES # BLD AUTO: 0.02 10*3/MM3 (ref 0–0.05)
IMM GRANULOCYTES NFR BLD AUTO: 0.3 % (ref 0–0.5)
LYMPHOCYTES # BLD AUTO: 1.61 10*3/MM3 (ref 0.7–3.1)
LYMPHOCYTES NFR BLD AUTO: 27.3 % (ref 19.6–45.3)
MCH RBC QN AUTO: 24.5 PG (ref 26.6–33)
MCHC RBC AUTO-ENTMCNC: 30.9 G/DL (ref 31.5–35.7)
MCV RBC AUTO: 79.4 FL (ref 79–97)
MONOCYTES # BLD AUTO: 0.53 10*3/MM3 (ref 0.1–0.9)
MONOCYTES NFR BLD AUTO: 9 % (ref 5–12)
NEUTROPHILS # BLD AUTO: 3.55 10*3/MM3 (ref 1.7–7)
NEUTROPHILS NFR BLD AUTO: 60.2 % (ref 42.7–76)
NRBC BLD AUTO-RTO: 0 /100 WBC (ref 0–0.2)
PLATELET # BLD AUTO: 323 10*3/MM3 (ref 140–450)
PMV BLD AUTO: 8.8 FL (ref 6–12)
RBC # BLD AUTO: 4.94 10*6/MM3 (ref 3.77–5.28)
WBC NRBC COR # BLD: 5.9 10*3/MM3 (ref 3.4–10.8)

## 2019-08-23 PROCEDURE — 99214 OFFICE O/P EST MOD 30 MIN: CPT | Performed by: INTERNAL MEDICINE

## 2019-08-23 PROCEDURE — 85025 COMPLETE CBC W/AUTO DIFF WBC: CPT

## 2019-08-23 PROCEDURE — 96413 CHEMO IV INFUSION 1 HR: CPT | Performed by: INTERNAL MEDICINE

## 2019-08-23 PROCEDURE — 25010000002 TRASTUZUMAB PER 10 MG: Performed by: INTERNAL MEDICINE

## 2019-08-23 RX ORDER — PREDNISOLONE ACETATE 10 MG/ML
SUSPENSION/ DROPS OPHTHALMIC
COMMUNITY
Start: 2019-08-09 | End: 2019-12-06

## 2019-08-23 RX ORDER — SODIUM FLUORIDE 6.1 MG/ML
GEL, DENTIFRICE DENTAL
COMMUNITY
Start: 2019-07-22 | End: 2020-03-27 | Stop reason: SDDI

## 2019-08-23 RX ORDER — SODIUM CHLORIDE 9 MG/ML
250 INJECTION, SOLUTION INTRAVENOUS ONCE
Status: COMPLETED | OUTPATIENT
Start: 2019-08-23 | End: 2019-08-23

## 2019-08-23 RX ORDER — EXEMESTANE 25 MG/1
TABLET ORAL
COMMUNITY
Start: 2019-08-05 | End: 2019-12-06 | Stop reason: SDUPTHER

## 2019-08-23 RX ADMIN — SODIUM CHLORIDE 250 ML: 900 INJECTION, SOLUTION INTRAVENOUS at 10:06

## 2019-08-23 RX ADMIN — TRASTUZUMAB 380 MG: 150 INJECTION, POWDER, LYOPHILIZED, FOR SOLUTION INTRAVENOUS at 10:29

## 2019-08-23 NOTE — PROGRESS NOTES
Subjective     REASON FOR CONSULTATION:    1.Multifocal right breast cancer post bilateral mastectomies- T1b N0+-10:00 tumor 8 mm grade 1 ER/ID positive HER-2 negative Oncotype 4  2.  5:00 tumor right breast ER/ID negative HER-2 positive 10 mm in size  3.  Negative genetic testing  4.  Adequate echo weekly Taxol Herceptin started on 9/21/18                          Referring physician Kwaku Gudino M.D.                            History of Present Illness : This is a 62-year-old lady with a multifocal right breast cancer T1b N0+ ER/ID negative HER-2 positive and the second T1b ER/ID positive HER-2 negative tumor here for her last but 1 dose of  maintenance Herceptin.  She was started on Arimidex  for her second tumor which was a low risk hormone positive tumor.  But after 2 months the pain in her hip and shin became unbearable and we switched to Aromasin which she has done very well overall    She saw the orthopedic doctor who thought she needed her left hip replaced and she does not want to do this at the time    Echocardiogram is due again before her next dose    Her bone density was reviewed and shows normal bone density     She denies headaches, speech disturbances, photophobia, new neuropathy.     Her appetite is good. Her bowels and bladder are working well.     She needs cataract surgery in the other eye and is going to wait till she finishes her Herceptin to start this    She will have reconstruction and her port removed at the same time    Past Medical History:   Diagnosis Date   • Environmental allergies    • GERD (gastroesophageal reflux disease)    • History of foreign travel     [place]   • Hypertension    • Malignant neoplasm of overlapping sites of right breast in female, estrogen receptor positive (CMS/HCC) 7/18/2018        Past Surgical History:   Procedure Laterality Date   • BREAST BIOPSY Right 2018    Malignant   • BREAST RECONSTRUCTION Bilateral 8/1/2018    Procedure: BILATERAL BREAST  TISSUE EXPANDER INSERTION WITH ALLODERM;  Surgeon: Waterhouse, Maurine, MD;  Location: [facility] MAIN OR;  Service: Plastics   • BREAST TISSUE EXPANDER REMOVAL INSERTION OF IMPLANT Bilateral 3/22/2019    Procedure: BILATERAL BREAST TISSUE EXPANDER REMOVAL INSERTION OF BILATERAL BREAST IMPLANT;  Surgeon: Waterhouse, Maurine, MD;  Location:  [facility];  Service: Plastics   • COLONOSCOPY N/A 3/24/2017    Procedure: COLONOSCOPY TO CECUM AND TI;  Surgeon: Aleksander Bella MD;  Location[facility];  Service:    • ENDOSCOPY N/A 3/24/2017    Procedure: ESOPHAGOGASTRODUODENOSCOPY;  Surgeon: Aleksander Bella MD;  Location: [facility] ENDOSCOPY;  Service:    • EYE SURGERY Right 05/01/2019    lens transplant   • HYSTERECTOMY  1989   • MASTECTOMY W/ SENTINEL NODE BIOPSY Bilateral 8/1/2018    Procedure: BILATERAL TOTAL MASTECTOMY WITH RIGHT SENTINEL LYMPH MARTINEZ BIOPSY;  Surgeon: Kwaku Gudino MD;  Location: [facility] MAIN OR;  Service: General   • OOPHORECTOMY Bilateral 1989   • VENOUS ACCESS DEVICE (PORT) INSERTION Left 9/14/2018    Procedure: INSERTION VENOUS ACCESS DEVICE;  Surgeon: Kwaku Gudino MD;  Location: [facility] MAIN OR;  Service: General      ONCOLOGIC HISTORY;  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  The patient had never had a previous mammogram and was referred for evaluation at which time she was found to have a 4.2 cm mass in the upper outer quadrant of the right breast at the 9:30 position associated with pleomorphic calcifications in the lower inner quadrant also felt to be suspicious left breast was benign.  Patient underwent ultrasound guided biopsy on 6/15/18 with the biopsy at the 9:30 position with clip placement showing invasive mammary carcinoma ductal type low-grade ER % AL 9800% HER-2 2+ negative by fish.  Patient also underwent biopsy at the 4:00 position which showed a  microscopic focus of invasive ductal carcinoma felt to be probably a lymphatic space involvement and was too small to do additional testing and a clip was placed.  Patient went on to have bilateral breast MRIs and was referred to Dr. Gudino MRIs confirmed 2 areas of abnormality at the breast-lower inner quadrant showed a large hematoma with a clip identified and there is extensive clumped abnormal enhancement beginning superior to the biopsy cavity and extending laterally to the entire upper half of the right breast into the upper outer quadrant measuring 1.2 x 1.4 cm and extending over 7 cm.  A biopsy clip was identified in this area the abnormal enhancement focally abutted the pectoralis muscle medially with no invasion through few borderline enlarged right axillary nodes with normal morphology no internal mammary adenopathy was noted left breast was benign.  Patient went on to have bilateral mastectomies the final path report showed 2 separate primaries in the breast associated with extensive high-grade DCIS measuring 6 cm  The lower inner quadrant lesion measured 1 cm was a grade 3 invasive ductal carcinoma ER 0 VA 0 HER-2 3+ and the lesion at the 9:30 position was a grade 1 invasive ductal carcinoma measuring 8 mm strongly ER/VA positive and HER-2 negative based on preop testing.  3 sentinel nodes were obtained 1 showed isolated tumor cells.  Margins showed  Positivity with DCIS in the inferior medial margin and was close to the deep margin (2mm) and inferior superficial margin(3mm).  Lymphovascular invasion was present.  Left breast is benign    Postoperatively she's done well and had her expanders filled and is here today to discuss adjuvant treatment.  Unfortunately she did not know about the testing on the second tumor at surgery and thought she had a grade 1 ER/VA positive HER-2 negative tumor and was very surprised when I told her the prior markers on the second tumor.    She is  0 para 0  menarche was at age 13 menopause at age 33 when she had a hysterectomy and oophorectomy.  She's been on hormone replacement since age of 33 until her recent diagnosis .    Family history is positive for mother having colon cancer age 72 maternal aunt with breast cancer in her 60s and another maternal aunt with uterine cancer in her 70s.  She's had negative genetic testing but the extended panel is being done and is pending    We discussed treatment plans and I have scheduled her for Chemotherapy education with tentative plans for weekly Taxol Herceptin for 12 weeks followed by Herceptin for a year  We will do an Oncotype DX score on the hormone positive tumor which is low-grade unlikely to be high risk    We discussed whether or not radiation would be indicated and except for the fact that there is a positive margin with high-grade DCIS normally she would not need radiation but I recommended a consultation because of the margins and the high grade nature of the DCIS.    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  She is presented at the multidiscipline conference and everybody felt comfortable with weekly Taxol Herceptin for 12 weeks followed by Herceptin for a year and Oncotype testing on the second tumor for which she would receive an aromatase inhibitor for 5 years unless it  was high risk which is very unlikely    She is here today having had an echo which showed an adequate ejection fraction  Of 61% and she had a port placed and is here to start treatment.    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    Unfortunately the wrong specimen was sent for Oncotype testing and ER negative specimen was sent and I talked to the pathologist and they will send the correct specimen for Oncotype DX testing with no talked to the patient.    That he has no questions and is happy to finally get started with therapy.    Genetic testing results are negative using the [employer]  Panel    1/19  She is already having pain in her left knee and wants to see an orthopedic surgeon for this pain is pretty sharp and she is taking Celebrex to help deal with this    We discussed the side effects of Arimidex including hot flashes and joint pains but she is wanting to do this rather than tamoxifen because she does not like the side effect profile of tamoxifen    She's never had a bone density and we will order this also and make sure it is adequate    She has no significant neuropathy and she is happy to be off the Taxol-she tolerated the Herceptin alone well but wants to cut back on the Benadryl because it makes her too sleepy    She lost a lot of hair in the chronic for him because her [place] cooling Did not fit well on the vertex of her skull    She reports mild blurry vision that started approximately after the 10th dose of Taxol and her ophthalmologist as she has cataracts that are affecting the macular part of her iron she needs to have surgeries sometime in the next month.  I told her that Herceptin does not cause this and all the  steroid premeds may have accelerated  to cataract          Current Outpatient Medications on File Prior to Visit   Medication Sig Dispense Refill   • chlorthalidone (HYGROTON) 25 MG tablet Take 25 mg by mouth Every Night.     • fexofenadine-pseudoephedrine (ALLEGRA-D 24) 180-240 MG per 24 hr tablet Take 1 tablet by mouth Daily.     • omeprazole-sodium bicarbonate (ZEGERID)  MG per capsule Take 1 capsule by mouth Every Night.     • raNITIdine (ZANTAC) 150 MG tablet Take 150 mg by mouth Daily.     • Turmeric 500 MG tablet Take 1,000 mg by mouth Daily.       No current facility-administered medications on file prior to visit.         ALLERGIES:    Allergies   Allergen Reactions   • Moxifloxacin Anaphylaxis and Rash   • Aloxi [Palonosetron Hcl] Itching   • Latex Rash     Added based on information entered during log entry, please review and add reactions, type, and severity as needed        Social History     Socioeconomic History   • Marital status:      Spouse name: FLORINA Gross   • Number of children: 0   • Years of education: Graduate School   • Highest education level: Not on file   Occupational History   • Occupation: Physician     Employer: [employer]   Tobacco Use   • Smoking status: Never Smoker   • Smokeless tobacco: Never Used   Substance and Sexual Activity   • Alcohol use: Yes     Types: 1 Glasses of wine per week   • Drug use: No   • Sexual activity: Defer     Comment:         Family History   Problem Relation Age of Onset   • Colon cancer Mother 72   • Breast cancer Maternal Aunt 60   • Uterine cancer Maternal Aunt 75   • Stroke Father    • Malig Hyperthermia Neg Hx         Review of Systems   Constitutional: Positive for diaphoresis.   Gastrointestinal:        Chronic indigestion   Endocrine: Positive for heat intolerance.   Neurological: 1-rd3rdgrdrrdarddrderd numbness.    Left knee pain 6/10 taking Celebrex    Objective     There were no vitals filed for this visit.  Current Status 7/12/2019   ECOG score 0        Physical Exam    GENERAL:  Well-developed, well-nourished in no acute distress.   SKIN:  Warm, dry without rashes, purpura or petechiae.  EYES:  Pupils equal, round and reactive to light.  EOMs intact.  Conjunctivae normal. Mild opacity of the right eye.   EARS:  Hearing intact.  NOSE:  Septum midline.  No excoriations or nasal discharge.  MOUTH:  Tongue is well-papillated; no stomatitis or ulcers.  Lips normal.  THROAT:  Oropharynx without lesions or exudates.  NECK:  Supple with good range of motion; no thyromegaly or masses, no JVD.  LYMPHATICS:  No cervical, supraclavicular, axillary or inguinal adenopathy.  CHEST:  Lungs clear to auscultation. Good airflow.  BREATS: Left breast is benign with an implant nipple intact the right breast has an implant in place   CARDIAC:  Regular rate and rhythm without murmurs, rubs or gallops. Normal S1,S2.  ABDOMEN:  Soft, nontender with no hepatosplenomegaly or masses.  EXTREMITIES:  No clubbing, cyanosis or edema.  No obvious fluid or warmth in the left knee  NEUROLOGICAL:  Cranial Nerves II-XII grossly intact.  No focal neurological deficits.  PSYCHIATRIC:  Normal affect and mood.        RECENT LABS:  Hematology WBC   Date Value Ref Range Status   08/02/2019 5.81 3.40 - 10.80 10*3/mm3 Final     RBC   Date Value Ref Range Status   08/02/2019 4.89 3.77 - 5.28 10*6/mm3 Final     Hemoglobin   Date Value Ref Range Status   08/02/2019 12.2 12.0 - 15.9 g/dL Final     Hematocrit   Date Value Ref Range Status   08/02/2019 39.3 34.0 - 46.6 % Final     Platelets   Date Value Ref Range Status   08/02/2019 302 140 - 450 10*3/mm3 Final        SYNOPTIC REPORT (Based on [facility] Cancer Case Summary, version January 2018):                Procedure: Total mastectomy.               Specimen/tumor laterality: Right.                Tumor site: Lower inner quadrant and central/inferior breast.                 Tumor size (greastest dimension of largest invasive focus): 1 cm (lower inner                        quadrant tumor).               Histologic type: Invasive carcinoma of no special type (ductal, not otherwise specified).                 Histologic grade (Rebcea Histologic Score):                              Glandular (acinar)/tubular differentiation: Score 3.                             Nuclear pleomorphism: Score 3.                             Mitotic rate: Score 2.                            Overall grade: Grade 3 (score 8 of 9).                            NOTE: The above Rebeca Histologic Score applies to the largest and                                    highest grade tumor (lower inner quadrant tumor).  The tumor in the                                    central/inferior breast is intermediate grade (tubular score 3, nuclear                                    score 2, mitotic score 1).               Tumor focality: Multiple foci of invasive carcinoma.                             Number of foci: 2.                              Sizes of individual foci: 1 cm (lower inner quadrant tumor) and approximately                                    0.8 cm (central/inferior tumor).               Ductal carcinoma in situ (DCIS): Present, positive for extensive intraductal component.                            Size (extent) of DCIS: Approximately greater than 6 cm (estimated based                                    on gross and microscopic findings).                            Architectural pattern: Solid and cribriform.                            Nuclear grade: Grade III (high).                            Necrosis: Central comedonecrosis.               Lobular carcinoma in situ (LCIS): No LCIS in specimen.               Margins:                             Invasive carcinoma margins: Uninvolved by invasive carcinoma.                                          Distance from closest margin: 2 mm (deep margin).                             DCIS margins: Positive for DCIS (inferior medial margin [lower inner  quadrant]).                                          Additional DCIS margins: DCIS extends to within 2 mm of deep margin                                                 and to within 3 mm of inferior superficial soft tissue margin.  Atypical                                                 ductal hyperplasia is focally present at the deep margin.               Regional lymph nodes: Involved by tumor cells.                             Number of lymph nodes with macrometastases: 0.                            Number of lymph nodes with micrometastases: 0.                            Number of lymph nodes with isolated tumor cells: 1.                            Number of lymph nodes examined: 3.                             Number of sentinel lymph nodes examined: 3.                Treatment effect: No known presurgical therapy.                Lymphovascular invasion: Present.               Dermal lymphovascular invasion: Not identified.                Pathologic stage classification:                             TNM descriptors: m (multiple foci of invasive carcinoma).                             Primary tumor: pT1b.                            Regional lymph nodes: pN0(i+)(sn).                             Distant metastasis: Not applicable.                Additional pathologic findings:                              Ductal hyperplasia of the usual type.                              Fibroinflammatory changes consistent with sites of prior biopsy (2 prior biopsy                                    sites).                 Ancillary studies:ER GA negative HER-2 positive lower inner quadrant tumor    Echocardiogram  Reading physician: Keily Jensen MD Ordering physician: Cecy Treviño MD Study date: 12/17/18     Study Description     A two-dimensional transthoracic echocardiogram with color flow and Doppler was performed. The study is technically difficult for diagnosis.Verbal consent was obtained from the patient to use  Definity contrast in order to optimize the study. The use of Definity was indicated as two or more contiguous segments of the left ventricular endocardial border were unable to be adequately visualized by standard imaging methods. 1.3 mL of mechanically activated Definity was mixed with 8.7 mL of normal saline. A total of 2 mL of the resulting Definity solution was administered, and the remaining contrast was wasted and discarded.   No adverse reaction to contrast was noted.   9/14/18 EF 61% No GLS     DEXA BONE DENSITY AXIAL  FINDINGS: Average lumbar bone mineral density 1.0 g/cm sq correlating to  a T value of -0.2 and a Z score of 1.4. Right femoral neck bone mineral  density 0.77 g/cm sq correlating to a T value of -0.7 and a Z score of  0.5. Left femoral neck bone mineral density 0.79 g/cm sq correlating to  a T value of -0.6 and a Z score of 0.6.     CONCLUSION: Normal bone mineral density.     This report was finalized on 3/18/2019      Echocardiogram  Study Description     A two-dimensional transthoracic echocardiogram with color flow and Doppler was performed.   The study is technically difficult for diagnosis. The quality of the study is limited due to poor acoustic windows related to breast implants. Verbal consent was obtained from the patient to use Definity contrast in order to optimize the study. Note also that contrast was used to enhance ejection fraction reproducability of the LV ejection fraction on serial echocardiograms.  The use of Definity was indicated as two or more contiguous segments of the left ventricular endocardial border were unable to be adequately visualized by standard imaging methods. 1.3 mL of mechanically activated Definity was mixed with 8.7 mL of normal saline. A total of 2 mL of the resulting Definity solution was administered, and the remaining contrast was wasted and discarded.   No adverse reaction to contrast was noted.   No GLS obtained due to breast implants and  spacers  12/17/18  EF=57%    09/14/18  EF=61%.   Electronically signed by Keily Jensen MD on 3/15/19 at 1713 EDT       FINDINGS: Average lumbar bone mineral density 1.0 g/cm sq correlating to  a T value of -0.2 and a Z score of 1.4. Right femoral neck bone mineral  density 0.77 g/cm sq correlating to a T value of -0.7 and a Z score of  0.5. Left femoral neck bone mineral density 0.79 g/cm sq correlating to  a T value of -0.6 and a Z score of 0.6.     CONCLUSION: Normal bone mineral density.     6/7/19  · Estimated EF = 67%.  · Left ventricular systolic function is normal.  · No change from prior study                Assessment/Plan   1.  Multifocal right breast cancer T1 BN(ic)-isolated tumor cells medial tumor being grade 3 ER/CT negative HER-2 3+ lateral tumor being T1b N0 grade 1 ER/CT positive HER-2 2+ negative fish Oncotype score of 4-post bilateral mastectomy nipple sparing on the left  ·   Positive family history of multiple malignancies genetic testing negative  · Weekly Taxol Herceptin started 9/28/2018 ×12 planned followed by aromatase inhibitor for her second tumor  · Arimidex started in 2/19-intolerant switch to Aromasin in 4/19    2.  Wound dehiscence no signs of infection resutured-expander removed in 3/19 with permanent implant placed    3. Blurry vision.  Cataract surgery in 3 weeks    4. Left knee pain referral to orthopedics planned -degenerative disease of the hip aggravated by Arimidex    Plan  1.  Herceptin # 12/13 maintenance dose today   2.  Aromasin 25 mg to continue  as adjuvant therapy for her second tumor   3.. Cataract surgery after completion of Herceptin  4.  Return in 4 weeks with Herceptin and an echo next week   5 see me in 4 months with a port removal in the interim.

## 2019-08-30 ENCOUNTER — HOSPITAL ENCOUNTER (OUTPATIENT)
Dept: CARDIOLOGY | Facility: HOSPITAL | Age: 64
Discharge: HOME | End: 2019-08-30
Admitting: INTERNAL MEDICINE

## 2019-08-30 VITALS
DIASTOLIC BLOOD PRESSURE: 70 MMHG | HEART RATE: 77 BPM | OXYGEN SATURATION: 100 % | HEIGHT: 64 IN | SYSTOLIC BLOOD PRESSURE: 118 MMHG | WEIGHT: 134 LBS | BODY MASS INDEX: 22.88 KG/M2

## 2019-08-30 DIAGNOSIS — Z17.0 MALIGNANT NEOPLASM OF OVERLAPPING SITES OF RIGHT BREAST IN FEMALE, ESTROGEN RECEPTOR POSITIVE (HCC): ICD-10-CM

## 2019-08-30 DIAGNOSIS — C50.811 MALIGNANT NEOPLASM OF OVERLAPPING SITES OF RIGHT BREAST IN FEMALE, ESTROGEN RECEPTOR POSITIVE (HCC): ICD-10-CM

## 2019-08-30 DIAGNOSIS — Z79.899 ENCOUNTER FOR LONG-TERM (CURRENT) USE OF HIGH-RISK MEDICATION: ICD-10-CM

## 2019-08-30 PROCEDURE — 93308 TTE F-UP OR LMTD: CPT | Performed by: INTERNAL MEDICINE

## 2019-08-30 PROCEDURE — 93321 DOPPLER ECHO F-UP/LMTD STD: CPT | Performed by: INTERNAL MEDICINE

## 2019-08-30 PROCEDURE — 93308 TTE F-UP OR LMTD: CPT

## 2019-08-30 PROCEDURE — 25010000002 PERFLUTREN (DEFINITY) 8.476 MG IN SODIUM CHLORIDE 0.9 % 10 ML INJECTION: Performed by: INTERNAL MEDICINE

## 2019-08-30 RX ADMIN — PERFLUTREN 1.5 ML: 6.52 INJECTION, SUSPENSION INTRAVENOUS at 09:38

## 2019-09-03 LAB
AORTIC ROOT ANNULUS: 1.7 CM
ASCENDING AORTA: 3 CM
BH CV ECHO MEAS - ACS: 1.6 CM
BH CV ECHO MEAS - AO ROOT AREA (BSA CORRECTED): 2
BH CV ECHO MEAS - AO ROOT AREA: 8.2 CM^2
BH CV ECHO MEAS - AO ROOT DIAM: 3.2 CM
BH CV ECHO MEAS - BSA(HAYCOCK): 1.7 M^2
BH CV ECHO MEAS - BSA: 1.6 M^2
BH CV ECHO MEAS - BZI_BMI: 23 KILOGRAMS/M^2
BH CV ECHO MEAS - BZI_METRIC_HEIGHT: 162.6 CM
BH CV ECHO MEAS - BZI_METRIC_WEIGHT: 60.8 KG
BH CV ECHO MEAS - EDV(MOD-SP2): 42 ML
BH CV ECHO MEAS - EDV(MOD-SP4): 48 ML
BH CV ECHO MEAS - EDV(TEICH): 60.3 ML
BH CV ECHO MEAS - EF(CUBED): 79.4 %
BH CV ECHO MEAS - EF(MOD-BP): 68 %
BH CV ECHO MEAS - EF(MOD-SP2): 69 %
BH CV ECHO MEAS - EF(MOD-SP4): 68.8 %
BH CV ECHO MEAS - EF(TEICH): 72.6 %
BH CV ECHO MEAS - ESV(MOD-SP2): 13 ML
BH CV ECHO MEAS - ESV(MOD-SP4): 15 ML
BH CV ECHO MEAS - ESV(TEICH): 16.6 ML
BH CV ECHO MEAS - IVS/LVPW: 1.1
BH CV ECHO MEAS - IVSD: 1 CM
BH CV ECHO MEAS - LAT PEAK E' VEL: 10 CM/SEC
BH CV ECHO MEAS - LV DIASTOLIC VOL/BSA (35-75): 29.1 ML/M^2
BH CV ECHO MEAS - LV MASS(C)D: 109.8 GRAMS
BH CV ECHO MEAS - LV MASS(C)DI: 66.6 GRAMS/M^2
BH CV ECHO MEAS - LV SYSTOLIC VOL/BSA (12-30): 9.1 ML/M^2
BH CV ECHO MEAS - LVIDD: 3.8 CM
BH CV ECHO MEAS - LVIDS: 2.2 CM
BH CV ECHO MEAS - LVLD AP2: 6 CM
BH CV ECHO MEAS - LVLD AP4: 6.5 CM
BH CV ECHO MEAS - LVLS AP2: 5.2 CM
BH CV ECHO MEAS - LVLS AP4: 5 CM
BH CV ECHO MEAS - LVOT AREA (M): 2.5 CM^2
BH CV ECHO MEAS - LVOT AREA: 2.5 CM^2
BH CV ECHO MEAS - LVOT DIAM: 1.8 CM
BH CV ECHO MEAS - LVPWD: 0.93 CM
BH CV ECHO MEAS - MED PEAK E' VEL: 9 CM/SEC
BH CV ECHO MEAS - MV A DUR: 0.12 SEC
BH CV ECHO MEAS - MV A MAX VEL: 92.6 CM/SEC
BH CV ECHO MEAS - MV DEC TIME: 0.16 SEC
BH CV ECHO MEAS - MV E MAX VEL: 92.2 CM/SEC
BH CV ECHO MEAS - MV E/A: 1
BH CV ECHO MEAS - SI(CUBED): 25.6 ML/M^2
BH CV ECHO MEAS - SI(MOD-SP2): 17.6 ML/M^2
BH CV ECHO MEAS - SI(MOD-SP4): 20 ML/M^2
BH CV ECHO MEAS - SI(TEICH): 26.5 ML/M^2
BH CV ECHO MEAS - SUP REN AO DIAM: 1.9 CM
BH CV ECHO MEAS - SV(CUBED): 42.2 ML
BH CV ECHO MEAS - SV(MOD-SP2): 29 ML
BH CV ECHO MEAS - SV(MOD-SP4): 33 ML
BH CV ECHO MEAS - SV(TEICH): 43.8 ML
BH CV ECHO MEASUREMENTS AVERAGE E/E' RATIO: 9.71
LEFT ATRIUM VOLUME INDEX: 14 ML/M2
MAXIMAL PREDICTED HEART RATE: 158 BPM
SINUS: 2.9 CM
STJ: 2.9 CM
STRESS TARGET HR: 134 BPM

## 2019-09-17 DIAGNOSIS — C50.811 MALIGNANT NEOPLASM OF OVERLAPPING SITES OF RIGHT BREAST IN FEMALE, ESTROGEN RECEPTOR NEGATIVE (HCC): ICD-10-CM

## 2019-09-17 DIAGNOSIS — Z17.1 MALIGNANT NEOPLASM OF OVERLAPPING SITES OF RIGHT BREAST IN FEMALE, ESTROGEN RECEPTOR NEGATIVE (HCC): ICD-10-CM

## 2019-09-17 RX ORDER — SODIUM CHLORIDE 9 MG/ML
250 INJECTION, SOLUTION INTRAVENOUS ONCE
Status: CANCELLED | OUTPATIENT
Start: 2019-09-17

## 2019-09-20 ENCOUNTER — INFUSION (OUTPATIENT)
Dept: ONCOLOGY | Facility: HOSPITAL | Age: 64
End: 2019-09-20

## 2019-09-20 VITALS
DIASTOLIC BLOOD PRESSURE: 76 MMHG | SYSTOLIC BLOOD PRESSURE: 129 MMHG | HEART RATE: 82 BPM | WEIGHT: 134.4 LBS | OXYGEN SATURATION: 98 % | TEMPERATURE: 98 F | BODY MASS INDEX: 23.07 KG/M2

## 2019-09-20 DIAGNOSIS — C50.811 MALIGNANT NEOPLASM OF OVERLAPPING SITES OF RIGHT BREAST IN FEMALE, ESTROGEN RECEPTOR NEGATIVE (HCC): Primary | ICD-10-CM

## 2019-09-20 DIAGNOSIS — Z17.1 MALIGNANT NEOPLASM OF OVERLAPPING SITES OF RIGHT BREAST IN FEMALE, ESTROGEN RECEPTOR NEGATIVE (HCC): Primary | ICD-10-CM

## 2019-09-20 DIAGNOSIS — Z79.899 ENCOUNTER FOR LONG-TERM (CURRENT) USE OF HIGH-RISK MEDICATION: ICD-10-CM

## 2019-09-20 LAB
BASOPHILS # BLD AUTO: 0.03 10*3/MM3 (ref 0–0.2)
BASOPHILS NFR BLD AUTO: 0.5 % (ref 0–1.5)
DEPRECATED RDW RBC AUTO: 47.6 FL (ref 37–54)
EOSINOPHIL # BLD AUTO: 0.13 10*3/MM3 (ref 0–0.4)
EOSINOPHIL NFR BLD AUTO: 2 % (ref 0.3–6.2)
ERYTHROCYTE [DISTWIDTH] IN BLOOD BY AUTOMATED COUNT: 16.4 % (ref 12.3–15.4)
HCT VFR BLD AUTO: 39.4 % (ref 34–46.6)
HGB BLD-MCNC: 12 G/DL (ref 12–15.9)
IMM GRANULOCYTES # BLD AUTO: 0.02 10*3/MM3 (ref 0–0.05)
IMM GRANULOCYTES NFR BLD AUTO: 0.3 % (ref 0–0.5)
LYMPHOCYTES # BLD AUTO: 1.63 10*3/MM3 (ref 0.7–3.1)
LYMPHOCYTES NFR BLD AUTO: 25.3 % (ref 19.6–45.3)
MCH RBC QN AUTO: 24.5 PG (ref 26.6–33)
MCHC RBC AUTO-ENTMCNC: 30.5 G/DL (ref 31.5–35.7)
MCV RBC AUTO: 80.4 FL (ref 79–97)
MONOCYTES # BLD AUTO: 0.62 10*3/MM3 (ref 0.1–0.9)
MONOCYTES NFR BLD AUTO: 9.6 % (ref 5–12)
NEUTROPHILS # BLD AUTO: 4.01 10*3/MM3 (ref 1.7–7)
NEUTROPHILS NFR BLD AUTO: 62.3 % (ref 42.7–76)
NRBC BLD AUTO-RTO: 0 /100 WBC (ref 0–0.2)
PLATELET # BLD AUTO: 318 10*3/MM3 (ref 140–450)
PMV BLD AUTO: 8.6 FL (ref 6–12)
RBC # BLD AUTO: 4.9 10*6/MM3 (ref 3.77–5.28)
WBC NRBC COR # BLD: 6.44 10*3/MM3 (ref 3.4–10.8)

## 2019-09-20 PROCEDURE — 85025 COMPLETE CBC W/AUTO DIFF WBC: CPT

## 2019-09-20 PROCEDURE — 96413 CHEMO IV INFUSION 1 HR: CPT

## 2019-09-20 PROCEDURE — 25010000002 TRASTUZUMAB PER 10 MG: Performed by: NURSE PRACTITIONER

## 2019-09-20 RX ORDER — SODIUM CHLORIDE 9 MG/ML
250 INJECTION, SOLUTION INTRAVENOUS ONCE
Status: COMPLETED | OUTPATIENT
Start: 2019-09-20 | End: 2019-09-20

## 2019-09-20 RX ORDER — SODIUM CHLORIDE 0.9 % (FLUSH) 0.9 %
10 SYRINGE (ML) INJECTION AS NEEDED
Status: DISCONTINUED | OUTPATIENT
Start: 2019-09-20 | End: 2019-09-20 | Stop reason: HOSPADM

## 2019-09-20 RX ORDER — SODIUM CHLORIDE 0.9 % (FLUSH) 0.9 %
10 SYRINGE (ML) INJECTION AS NEEDED
Status: CANCELLED | OUTPATIENT
Start: 2019-09-20

## 2019-09-20 RX ADMIN — SODIUM CHLORIDE, PRESERVATIVE FREE 10 ML: 5 INJECTION INTRAVENOUS at 10:12

## 2019-09-20 RX ADMIN — Medication 500 UNITS: at 10:12

## 2019-09-20 RX ADMIN — TRASTUZUMAB 380 MG: 150 INJECTION, POWDER, LYOPHILIZED, FOR SOLUTION INTRAVENOUS at 09:38

## 2019-09-20 RX ADMIN — SODIUM CHLORIDE 250 ML: 9 INJECTION, SOLUTION INTRAVENOUS at 09:38

## 2019-12-05 NOTE — PROGRESS NOTES
Subjective     REASON FOR CONSULTATION:    1.Multifocal right breast cancer post bilateral mastectomies- T1b N0+-10:00 tumor 8 mm grade 1 ER/MT positive HER-2 negative Oncotype 4  2.  5:00 tumor right breast ER/MT negative HER-2 positive 10 mm in size  3.  Negative genetic testing  4.  Adequate echo weekly Taxol Herceptin started on 9/21/18                          Referring physician Kwaku Gudino M.D.                            History of Present Illness : This is a 62-year-old lady with a multifocal right breast cancer T1b N0+ ER/MT negative HER-2 positive and the second T1b ER/MT positive HER-2 negative tumor here for follow-up after completing maintenance Herceptin.      She was started on Arimidex  for her second tumor which was a low risk hormone positive tumor.  But after 2 months the pain in her hip and shin became unbearable and we switched to Aromasin which she has done very well overall    She saw the orthopedic doctor who thought she needed her left hip replaced and she does not want to do this at the time she tells me Januvia helps significantly with joint pains and she is doing this     She denies headaches, speech disturbances, photophobia, new neuropathy.     Her appetite is good. Her bowels and bladder are working well.     She has had her cataract surgeries and her vision is much improved    She will have  her port removed before her next visit to avoid flushing    Past Medical History:   Diagnosis Date   • Environmental allergies    • GERD (gastroesophageal reflux disease)    • History of foreign travel     [place]   • Hypertension    • Malignant neoplasm of overlapping sites of right breast in female, estrogen receptor positive (CMS/HCC) 7/18/2018        Past Surgical History:   Procedure Laterality Date   • BREAST BIOPSY Right 2018    Malignant   • BREAST RECONSTRUCTION Bilateral 8/1/2018    Procedure: BILATERAL BREAST TISSUE EXPANDER INSERTION WITH ALLODERM;  Surgeon: Waterhouse,  MD Micheline;  Location: [facility] MAIN OR;  Service: Plastics   • BREAST TISSUE EXPANDER REMOVAL INSERTION OF IMPLANT Bilateral 3/22/2019    Procedure: BILATERAL BREAST TISSUE EXPANDER REMOVAL INSERTION OF BILATERAL BREAST IMPLANT;  Surgeon: Waterhouse, Maurine, MD;  Location: [facility] OR OSC;  Service: Plastics   • COLONOSCOPY N/A 3/24/2017    Procedure: COLONOSCOPY TO CECUM AND TI;  Surgeon: Aleksander Bella MD;  Location: [facility] ENDOSCOPY;  Service:    • ENDOSCOPY N/A 3/24/2017    Procedure: ESOPHAGOGASTRODUODENOSCOPY;  Surgeon: Aleksander Bella MD;  Location: [facility] ENDOSCOPY;  Service:    • EYE SURGERY Right 05/01/2019    lens transplant   • HYSTERECTOMY  1989   • MASTECTOMY W/ SENTINEL NODE BIOPSY Bilateral 8/1/2018    Procedure: BILATERAL TOTAL MASTECTOMY WITH RIGHT SENTINEL LYMPH MARTINEZ BIOPSY;  Surgeon: Kwaku Gudino MD;  Location: 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  The patient had never had a previous mammogram and was referred for evaluation at which time she was found to have a 4.2 cm mass in the upper outer quadrant of the right breast at the 9:30 position associated with pleomorphic calcifications in the lower inner quadrant also felt to be suspicious left breast was benign.  Patient underwent ultrasound guided biopsy on 6/15/18 with the biopsy at the 9:30 position with clip placement showing invasive mammary carcinoma ductal type low-grade ER % GA 9800% HER-2 2+ negative by fish.  Patient also underwent biopsy at the 4:00 position which showed a microscopic focus of invasive ductal carcinoma felt to be probably  a lymphatic space involvement and was too small to do additional testing and a clip was placed.  Patient went on to have bilateral breast MRIs and was referred to Dr. Gudino MRIs confirmed 2 areas of abnormality at the breast-lower inner quadrant showed a large hematoma with a clip identified and there is extensive clumped abnormal enhancement beginning superior to the biopsy cavity and extending laterally to the entire upper half of the right breast into the upper outer quadrant measuring 1.2 x 1.4 cm and extending over 7 cm.  A biopsy clip was identified in this area the abnormal enhancement focally abutted the pectoralis muscle medially with no invasion through few borderline enlarged right axillary nodes with normal morphology no internal mammary adenopathy was noted left breast was benign.  Patient went on to have bilateral mastectomies the final path report showed 2 separate primaries in the breast associated with extensive high-grade DCIS measuring 6 cm  The lower inner quadrant lesion measured 1 cm was a grade 3 invasive ductal carcinoma ER 0 OR 0 HER-2 3+ and the lesion at the 9:30 position was a grade 1 invasive ductal carcinoma measuring 8 mm strongly ER/OR positive and HER-2 negative based on preop testing.  3 sentinel nodes were obtained 1 showed isolated tumor cells.  Margins showed  Positivity with DCIS in the inferior medial margin and was close to the deep margin (2mm) and inferior superficial margin(3mm).  Lymphovascular invasion was present.  Left breast is benign    Postoperatively she's done well and had her expanders filled and is here today to discuss adjuvant treatment.  Unfortunately she did not know about the testing on the second tumor at surgery and thought she had a grade 1 ER/OR positive HER-2 negative tumor and was very surprised when I told her the prior markers on the second tumor.    She is  0 para 0 menarche was at age 13 menopause at age 33 when she had a hysterectomy and  oophorectomy.  She's been on hormone replacement since age of 33 until her recent diagnosis .    Family history is positive for mother having colon cancer age 72 maternal aunt with breast cancer in her 60s and another maternal aunt with uterine cancer in her 70s.  She's had negative genetic testing but the extended panel is being done and is pending    We discussed treatment plans and I have scheduled her for Chemotherapy education with tentative plans for weekly Taxol Herceptin for 12 weeks followed by Herceptin for a year  We will do an Oncotype DX score on the hormone positive tumor which is low-grade unlikely to be high risk    We discussed whether or not radiation would be indicated and except for the fact that there is a positive margin with high-grade DCIS normally she would not need radiation but I recommended a consultation because of the margins and the high grade nature of the DCIS.    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  She is presented at the multidiscipline conference and everybody felt comfortable with weekly Taxol Herceptin for 12 weeks followed by Herceptin for a year and Oncotype testing on the second tumor for which she would receive an aromatase inhibitor for 5 years unless it was high risk which is very unlikely    She is here today having had an  echo which showed an adequate ejection fraction  Of 61% and she had a port placed and is here to start treatment.    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    Unfortunately the wrong specimen was sent for Oncotype testing and ER negative specimen was sent and I talked to the pathologist and they will send the correct specimen for Oncotype DX testing with no talked to the patient.    That he has no questions and is happy to finally get started with therapy.    Genetic testing results are negative using the [employer]  Panel    1/19  She is already having pain in her left knee and wants to see an orthopedic surgeon for this pain is pretty sharp and she is taking Celebrex to help deal with this    We discussed the side effects of Arimidex including hot flashes and joint pains but she is wanting to do this rather than tamoxifen because she does not like the side effect profile of tamoxifen    She's never had a bone density and we will order this also and make sure it is adequate    She has no significant neuropathy and she is happy to be off the Taxol-she tolerated the Herceptin alone well but wants to cut back on the Benadryl because it makes her too sleepy    She lost a lot of hair in the chronic for him because her [place] cooling Did not fit well on the vertex of her skull    She reports mild blurry vision that started approximately after the 10th dose of Taxol and her ophthalmologist as she has cataracts that are affecting the macular part of her iron she needs to have surgeries sometime in the next month.  I told her that Herceptin does not cause this and all the  steroid premeds may have accelerated to cataract          Current Outpatient Medications on File Prior to  Visit   Medication Sig Dispense Refill   • chlorthalidone (HYGROTON) 25 MG tablet Take 25 mg by mouth Every Night.     • fexofenadine-pseudoephedrine (ALLEGRA-D 24) 180-240 MG per 24 hr tablet Take 1 tablet by mouth Daily.     • omeprazole-sodium bicarbonate (ZEGERID)  MG per capsule Take 1 capsule by mouth Every Night.     • potassium chloride (MICRO-K) 10 MEQ CR capsule      • PREVIDENT 5000 DRY MOUTH 1.1 % gel      • Turmeric 500 MG tablet Take 1,000 mg by mouth Daily.     • [DISCONTINUED] exemestane (AROMASIN) 25 MG chemo tablet      • [DISCONTINUED] prednisoLONE acetate (PRED FORTE) 1 % ophthalmic suspension      • [DISCONTINUED] raNITIdine (ZANTAC) 150 MG tablet Take 150 mg by mouth Daily.       No current facility-administered medications on file prior to visit.         ALLERGIES:    Allergies   Allergen Reactions   • Moxifloxacin Anaphylaxis and Rash   • Aloxi [Palonosetron Hcl] Itching   • Latex Rash     Added based on information entered during log entry, please review and add reactions, type, and severity as needed        Social History     Socioeconomic History   • Marital status:      Spouse name: FLORINA Gross   • Number of children: 0   • Years of education: Graduate School   • Highest education level: Not on file   Occupational History   • Occupation: Physician     Employer: [employer]   Tobacco Use   • Smoking status: Never Smoker   • Smokeless tobacco: Never Used   Substance and Sexual Activity   • Alcohol use: Yes     Types: 1 Glasses of wine per week   • Drug use: No   • Sexual activity: Defer     Comment:         Family History   Problem Relation Age of Onset   • Colon cancer Mother 72   • Breast cancer Maternal Aunt 60   • Uterine cancer Maternal Aunt 75   • Stroke Father    • Malig Hyperthermia Neg Hx         Review of Systems   Constitutional: Positive for diaphoresis.   Gastrointestinal:        Chronic indigestion   Endocrine: Positive for heat  "intolerance.   Neurological: 1-st1stgstrstastdstest numbness.    Left hip pain 6/10 taking Januvia    Objective     Vitals:    12/06/19 0916   BP: 135/73   Pulse: 86   Resp: 16   Temp: 97.7 °F (36.5 °C)   SpO2: 99%   Weight: 61.8 kg (136 lb 3.2 oz)   Height: 162.6 cm (64.02\")   PainSc: 0-No pain     Current Status 12/6/2019   ECOG score 0       Physical Exam    GENERAL:  Well-developed, well-nourished in no acute distress.   SKIN:  Warm, dry without rashes, purpura or petechiae.  EYES:  Pupils equal, round and reactive to light.  EOMs intact.  Conjunctivae normal. Mild opacity of the right eye.   EARS:  Hearing intact.  NOSE:  Septum midline.  No excoriations or nasal discharge.  MOUTH:  Tongue is well-papillated; no stomatitis or ulcers.  Lips normal.  THROAT:  Oropharynx without lesions or exudates.  NECK:  Supple with good range of motion; no thyromegaly or masses, no JVD.  LYMPHATICS:  No cervical, supraclavicular, axillary or inguinal adenopathy.  CHEST:  Lungs clear to auscultation. Good airflow.  BREATS: Left breast is benign with an implant nipple intact the right breast has an implant in place   CARDIAC:  Regular rate and rhythm without murmurs, rubs or gallops. Normal S1,S2.  ABDOMEN:  Soft, nontender with no hepatosplenomegaly or masses.  EXTREMITIES:  No clubbing, cyanosis or edema.  No obvious fluid or warmth in the left knee  NEUROLOGICAL:  Cranial Nerves II-XII grossly intact.  No focal neurological deficits.  PSYCHIATRIC:  Normal affect and mood.        RECENT LABS:  Hematology WBC   Date Value Ref Range Status   12/06/2019 5.64 3.40 - 10.80 10*3/mm3 Final     RBC   Date Value Ref Range Status   12/06/2019 4.90 3.77 - 5.28 10*6/mm3 Final     Hemoglobin   Date Value Ref Range Status   12/06/2019 12.4 12.0 - 15.9 g/dL Final     Hematocrit   Date Value Ref Range Status   12/06/2019 39.7 34.0 - 46.6 % Final     Platelets   Date Value Ref Range Status   12/06/2019 328 140 - 450 10*3/mm3 Final        SYNOPTIC REPORT " (Based on [facility] Cancer Case Summary, version January 2018):                Procedure: Total mastectomy.               Specimen/tumor laterality: Right.                Tumor site: Lower inner quadrant and central/inferior breast.                 Tumor size (greastest dimension of largest invasive focus): 1 cm (lower inner                       quadrant tumor).               Histologic type: Invasive carcinoma of no special type (ductal, not otherwise specified).                 Histologic grade (Rebeca Histologic Score):                              Glandular (acinar)/tubular differentiation: Score 3.                             Nuclear pleomorphism: Score 3.                             Mitotic rate: Score 2.                            Overall grade: Grade 3 (score 8 of 9).                            NOTE: The above Cliff Histologic Score applies to the largest and                                    highest grade tumor (lower inner quadrant tumor).  The tumor in the                                    central/inferior breast is intermediate grade (tubular score 3, nuclear                                    score 2, mitotic score 1).               Tumor focality: Multiple foci of invasive carcinoma.                             Number of foci: 2.                              Sizes of individual foci: 1 cm (lower inner quadrant tumor) and approximately                                    0.8 cm (central/inferior tumor).               Ductal carcinoma in situ (DCIS): Present, positive for extensive intraductal component.                            Size (extent) of DCIS: Approximately greater than 6 cm (estimated based                                    on gross and microscopic findings).                            Architectural pattern: Solid and cribriform.                            Nuclear grade: Grade III (high).                            Necrosis: Central comedonecrosis.               Lobular carcinoma in situ  (LCIS): No LCIS in specimen.               Margins:                             Invasive carcinoma margins: Uninvolved by invasive carcinoma.                                          Distance from closest margin: 2 mm (deep margin).                             DCIS margins: Positive for DCIS (inferior medial margin [lower inner quadrant]).                                          Additional DCIS margins: DCIS extends to within 2 mm of deep margin                                                 and to within 3 mm of inferior superficial soft tissue margin.  Atypical                                                 ductal hyperplasia is focally present at the deep margin.               Regional lymph nodes: Involved by tumor cells.                             Number of lymph nodes with macrometastases: 0.                            Number of lymph nodes with micrometastases: 0.                            Number of lymph nodes with isolated tumor cells: 1.                            Number of lymph nodes examined: 3.                             Number of sentinel lymph nodes examined: 3.                Treatment effect: No known presurgical therapy.                Lymphovascular invasion: Present.               Dermal lymphovascular invasion: Not identified.                Pathologic stage classification:                             TNM descriptors: m (multiple foci of invasive carcinoma).                             Primary tumor: pT1b.                            Regional lymph nodes: pN0(i+)(sn).                             Distant metastasis: Not applicable.                Additional pathologic findings:                              Ductal hyperplasia of the usual type.                              Fibroinflammatory changes consistent with sites of prior biopsy (2 prior biopsy                                    sites).                 Ancillary studies:ER VT negative HER-2 positive lower inner quadrant  tumor    Echocardiogram  Reading physician: Keily Jensen MD Ordering physician: Cecy Treviño MD Study date: 12/17/18     Study Description     A two-dimensional transthoracic echocardiogram with color flow and Doppler was performed. The study is technically difficult for diagnosis.Verbal consent was obtained from the patient to use Definity contrast in order to optimize the study. The use of Definity was indicated as two or more contiguous segments of the left ventricular endocardial border were unable to be adequately visualized by standard imaging methods. 1.3 mL of mechanically activated Definity was mixed with 8.7 mL of normal saline. A total of 2 mL of the resulting Definity solution was administered, and the remaining contrast was wasted and discarded.   No adverse reaction to contrast was noted.   9/14/18 EF 61% No GLS     DEXA BONE DENSITY AXIAL  FINDINGS: Average lumbar bone mineral density 1.0 g/cm sq correlating to  a T value of -0.2 and a Z score of 1.4. Right femoral neck bone mineral  density 0.77 g/cm sq correlating to a T value of -0.7 and a Z score of  0.5. Left femoral neck bone mineral density 0.79 g/cm sq correlating to  a T value of -0.6 and a Z score of 0.6.     CONCLUSION: Normal bone mineral density.     This report was finalized on 3/18/2019      Echocardiogram  Study Description     A two-dimensional transthoracic echocardiogram with color flow and Doppler was performed.   The study is technically difficult for diagnosis. The quality of the study is limited due to poor acoustic windows related to breast implants. Verbal consent was obtained from the patient to use Definity contrast in order to optimize the study. Note also that contrast was used to enhance ejection fraction reproducability of the LV ejection fraction on serial echocardiograms.  The use of Definity was indicated as two or more contiguous segments of the left ventricular endocardial border were unable to be adequately  visualized by standard imaging methods. 1.3 mL of mechanically activated Definity was mixed with 8.7 mL of normal saline. A total of 2 mL of the resulting Definity solution was administered, and the remaining contrast was wasted and discarded.   No adverse reaction to contrast was noted.   No GLS obtained due to breast implants and spacers  12/17/18  EF=57%    09/14/18  EF=61%.   Electronically signed by Keily Jensen MD on 3/15/19 at 1713 EDT       FINDINGS: Average lumbar bone mineral density 1.0 g/cm sq correlating to  a T value of -0.2 and a Z score of 1.4. Right femoral neck bone mineral  density 0.77 g/cm sq correlating to a T value of -0.7 and a Z score of  0.5. Left femoral neck bone mineral density 0.79 g/cm sq correlating to  a T value of -0.6 and a Z score of 0.6.     CONCLUSION: Normal bone mineral density.     6/7/19  · Estimated EF = 67%.  · Left ventricular systolic function is normal.  · No change from prior study       Interpretation Summary     · Left ventricular systolic function is normal.  · Calculated EF = 68%.  · C/w prior study 3/15/19, there is no change.      Electronically signed by Naomi Mars MD on 8/30/19 at 1555 EDT           Assessment/Plan   1.  Multifocal right breast cancer T1 BN(ic)-isolated tumor cells medial tumor being grade 3 ER/NC negative HER-2 3+ lateral tumor being T1b N0 grade 1 ER/NC positive HER-2 2+ negative fish Oncotype score of 4-post bilateral mastectomy nipple sparing on the left  ·   Positive family history of multiple malignancies genetic testing negative  · Weekly Taxol Herceptin started 9/28/2018 ×12 planned followed by aromatase inhibitor for her second tumor  · Arimidex started in 2/19-intolerant switch to Aromasin in 4/19    2.  Wound dehiscence no signs of infection resutured-expander removed in 3/19 with permanent implant placed    3. Blurry vision.  Cataract surgery in 3 weeks    4. Left hip pain referral to orthopedics planned -degenerative  disease of the hip aggravated by Arimidex    Plan  1.  Port removal  2.  Aromasin 25 mg to continue  as adjuvant therapy for her second tumor   3.. Return in 4 months for follow-up

## 2019-12-06 ENCOUNTER — OFFICE VISIT (OUTPATIENT)
Dept: ONCOLOGY | Facility: CLINIC | Age: 64
End: 2019-12-06

## 2019-12-06 ENCOUNTER — LAB (OUTPATIENT)
Dept: ONCOLOGY | Facility: HOSPITAL | Age: 64
End: 2019-12-06

## 2019-12-06 VITALS
HEART RATE: 86 BPM | DIASTOLIC BLOOD PRESSURE: 73 MMHG | WEIGHT: 136.2 LBS | RESPIRATION RATE: 16 BRPM | HEIGHT: 64 IN | SYSTOLIC BLOOD PRESSURE: 135 MMHG | BODY MASS INDEX: 23.25 KG/M2 | TEMPERATURE: 97.7 F | OXYGEN SATURATION: 99 %

## 2019-12-06 DIAGNOSIS — C50.811 MALIGNANT NEOPLASM OF OVERLAPPING SITES OF RIGHT BREAST IN FEMALE, ESTROGEN RECEPTOR POSITIVE (HCC): ICD-10-CM

## 2019-12-06 DIAGNOSIS — Z79.899 ENCOUNTER FOR LONG-TERM (CURRENT) USE OF HIGH-RISK MEDICATION: ICD-10-CM

## 2019-12-06 DIAGNOSIS — Z17.0 MALIGNANT NEOPLASM OF OVERLAPPING SITES OF RIGHT BREAST IN FEMALE, ESTROGEN RECEPTOR POSITIVE (HCC): Primary | ICD-10-CM

## 2019-12-06 DIAGNOSIS — Z17.0 MALIGNANT NEOPLASM OF OVERLAPPING SITES OF RIGHT BREAST IN FEMALE, ESTROGEN RECEPTOR POSITIVE (HCC): ICD-10-CM

## 2019-12-06 DIAGNOSIS — Z79.899 ENCOUNTER FOR LONG-TERM (CURRENT) USE OF HIGH-RISK MEDICATION: Primary | ICD-10-CM

## 2019-12-06 DIAGNOSIS — C50.811 MALIGNANT NEOPLASM OF OVERLAPPING SITES OF RIGHT BREAST IN FEMALE, ESTROGEN RECEPTOR POSITIVE (HCC): Primary | ICD-10-CM

## 2019-12-06 LAB
ALBUMIN SERPL-MCNC: 4.5 G/DL (ref 3.5–5.2)
ALBUMIN/GLOB SERPL: 1.4 G/DL (ref 1.1–2.4)
ALP SERPL-CCNC: 102 U/L (ref 38–116)
ALT SERPL W P-5'-P-CCNC: 23 U/L (ref 0–33)
ANION GAP SERPL CALCULATED.3IONS-SCNC: 12.8 MMOL/L (ref 5–15)
AST SERPL-CCNC: 25 U/L (ref 0–32)
BASOPHILS # BLD AUTO: 0.06 10*3/MM3 (ref 0–0.2)
BASOPHILS NFR BLD AUTO: 1.1 % (ref 0–1.5)
BILIRUB SERPL-MCNC: 0.3 MG/DL (ref 0.2–1.2)
BUN BLD-MCNC: 23 MG/DL (ref 6–20)
BUN/CREAT SERPL: 29.9 (ref 7.3–30)
CALCIUM SPEC-SCNC: 9.5 MG/DL (ref 8.5–10.2)
CHLORIDE SERPL-SCNC: 98 MMOL/L (ref 98–107)
CO2 SERPL-SCNC: 29.2 MMOL/L (ref 22–29)
CREAT BLD-MCNC: 0.77 MG/DL (ref 0.6–1.1)
DEPRECATED RDW RBC AUTO: 46.6 FL (ref 37–54)
EOSINOPHIL # BLD AUTO: 0.22 10*3/MM3 (ref 0–0.4)
EOSINOPHIL NFR BLD AUTO: 3.9 % (ref 0.3–6.2)
ERYTHROCYTE [DISTWIDTH] IN BLOOD BY AUTOMATED COUNT: 15.9 % (ref 12.3–15.4)
GFR SERPL CREATININE-BSD FRML MDRD: 76 ML/MIN/1.73
GFR SERPL CREATININE-BSD FRML MDRD: 92 ML/MIN/1.73
GLOBULIN UR ELPH-MCNC: 3.2 GM/DL (ref 1.8–3.5)
GLUCOSE BLD-MCNC: 122 MG/DL (ref 74–124)
HCT VFR BLD AUTO: 39.7 % (ref 34–46.6)
HGB BLD-MCNC: 12.4 G/DL (ref 12–15.9)
IMM GRANULOCYTES # BLD AUTO: 0.02 10*3/MM3 (ref 0–0.05)
IMM GRANULOCYTES NFR BLD AUTO: 0.4 % (ref 0–0.5)
LYMPHOCYTES # BLD AUTO: 1.53 10*3/MM3 (ref 0.7–3.1)
LYMPHOCYTES NFR BLD AUTO: 27.1 % (ref 19.6–45.3)
MCH RBC QN AUTO: 25.3 PG (ref 26.6–33)
MCHC RBC AUTO-ENTMCNC: 31.2 G/DL (ref 31.5–35.7)
MCV RBC AUTO: 81 FL (ref 79–97)
MONOCYTES # BLD AUTO: 0.52 10*3/MM3 (ref 0.1–0.9)
MONOCYTES NFR BLD AUTO: 9.2 % (ref 5–12)
NEUTROPHILS # BLD AUTO: 3.29 10*3/MM3 (ref 1.7–7)
NEUTROPHILS NFR BLD AUTO: 58.3 % (ref 42.7–76)
NRBC BLD AUTO-RTO: 0 /100 WBC (ref 0–0.2)
PLATELET # BLD AUTO: 328 10*3/MM3 (ref 140–450)
PMV BLD AUTO: 8.8 FL (ref 6–12)
POTASSIUM BLD-SCNC: 3.8 MMOL/L (ref 3.5–4.7)
PROT SERPL-MCNC: 7.7 G/DL (ref 6.3–8)
RBC # BLD AUTO: 4.9 10*6/MM3 (ref 3.77–5.28)
SODIUM BLD-SCNC: 140 MMOL/L (ref 134–145)
WBC NRBC COR # BLD: 5.64 10*3/MM3 (ref 3.4–10.8)

## 2019-12-06 PROCEDURE — 96523 IRRIG DRUG DELIVERY DEVICE: CPT | Performed by: INTERNAL MEDICINE

## 2019-12-06 PROCEDURE — 99213 OFFICE O/P EST LOW 20 MIN: CPT | Performed by: INTERNAL MEDICINE

## 2019-12-06 PROCEDURE — 80053 COMPREHEN METABOLIC PANEL: CPT | Performed by: INTERNAL MEDICINE

## 2019-12-06 PROCEDURE — 85025 COMPLETE CBC W/AUTO DIFF WBC: CPT | Performed by: INTERNAL MEDICINE

## 2019-12-06 RX ORDER — SODIUM CHLORIDE 0.9 % (FLUSH) 0.9 %
10 SYRINGE (ML) INJECTION AS NEEDED
OUTPATIENT
Start: 2019-12-06

## 2019-12-06 RX ORDER — SODIUM CHLORIDE 0.9 % (FLUSH) 0.9 %
10 SYRINGE (ML) INJECTION AS NEEDED
Status: CANCELLED | OUTPATIENT
Start: 2019-12-06

## 2019-12-06 RX ORDER — POTASSIUM CHLORIDE 750 MG/1
CAPSULE, EXTENDED RELEASE ORAL
COMMUNITY
Start: 2019-11-22 | End: 2021-03-26 | Stop reason: SDUPTHER

## 2019-12-06 RX ORDER — SODIUM CHLORIDE 0.9 % (FLUSH) 0.9 %
10 SYRINGE (ML) INJECTION AS NEEDED
Status: DISCONTINUED | OUTPATIENT
Start: 2019-12-06 | End: 2019-12-06 | Stop reason: HOSPADM

## 2019-12-06 RX ORDER — EXEMESTANE 25 MG/1
25 TABLET ORAL DAILY
Qty: 90 TABLET | Refills: 3 | Status: SHIPPED | OUTPATIENT
Start: 2019-12-06 | End: 2020-10-02 | Stop reason: SDUPTHER

## 2019-12-06 RX ORDER — HEPARIN SODIUM (PORCINE) LOCK FLUSH IV SOLN 100 UNIT/ML 100 UNIT/ML
500 SOLUTION INTRAVENOUS AS NEEDED
OUTPATIENT
Start: 2019-12-06

## 2019-12-06 RX ADMIN — Medication 500 UNITS: at 09:11

## 2019-12-06 RX ADMIN — SODIUM CHLORIDE, PRESERVATIVE FREE 10 ML: 5 INJECTION INTRAVENOUS at 09:11

## 2020-03-26 NOTE — PROGRESS NOTES
Subjective     REASON FOR CONSULTATION:    1.Multifocal right breast cancer post bilateral mastectomies- T1b N0+-10:00 tumor 8 mm grade 1 ER/TN positive HER-2 negative Oncotype 4  2.  5:00 tumor right breast ER/TN negative HER-2 positive 10 mm in size  3.  Negative genetic testing  4.  Adequate echo weekly Taxol Herceptin started on 9/21/18                          Referring physician Kwaku Gudino M.D.                            History of Present Illness : This is a 62-year-old lady with a multifocal right breast cancer T1b N0+ ER/TN negative HER-2 positive and the second T1b ER/TN positive HER-2 negative tumor here for follow-up after completing maintenance Herceptin.      She was started on Arimidex  for her second tumor which was a low risk hormone positive tumor.  But after 2 months the pain in her hip and shin became unbearable and we switched to Aromasin which she has done very well overall.  She is starting to have more pain in her finger joints and swelling and inability wear her wedding rings I suggested a switch to tamoxifen but she wants to hold off for the time being    She saw the orthopedic doctor who thought she needed her left hip replaced and she does not want to do this at the time she tells me Januvia helps significantly with joint pains and she is doing this     She denies headaches, speech disturbances, photophobia, new neuropathy.     Her appetite is good. Her bowels and bladder are working well.     She has had her cataract surgeries and her vision is much improved    She is planning to move to [place] to a practice there and she will continue follow-up here for the time being    Past Medical History:   Diagnosis Date   • Environmental allergies    • GERD (gastroesophageal reflux disease)    • History of foreign travel     [place]   • Hypertension    • Malignant neoplasm of overlapping sites of right breast in female, estrogen receptor positive (CMS/HCC) 7/18/2018        Past  Surgical History:   Procedure Laterality Date   • BREAST BIOPSY Right 2018    Malignant   • BREAST RECONSTRUCTION Bilateral 8/1/2018    Procedure: BILATERAL BREAST TISSUE EXPANDER INSERTION WITH ALLODERM;  Surgeon: Waterhouse, Maurine, MD;  Location: [facility] MAIN OR;  Service: Plastics   • BREAST TISSUE EXPANDER REMOVAL INSERTION OF IMPLANT Bilateral 3/22/2019    Procedure: BILATERAL BREAST TISSUE EXPANDER REMOVAL INSERTION OF BILATERAL BREAST IMPLANT;  Surgeon: Waterhouse, Maurine, MD;  Location: [facility] OR OSC;  Service: Plastics   • COLONOSCOPY N/A 3/24/2017    Procedure: COLONOSCOPY TO CECUM AND TI;  Surgeon: Aleksander Bella MD;  Location: [facility] ENDOSCOPY;  Service:    • ENDOSCOPY N/A 3/24/2017    Procedure: ESOPHAGOGASTRODUODENOSCOPY;  Surgeon: Aleksander Bella MD;  Location: [facility] ENDOSCOPY;  Service:    • EYE SURGERY Right 05/01/2019    lens transplant   • HYSTERECTOMY  1989   • MASTECTOMY W/ SENTINEL NODE BIOPSY Bilateral 8/1/2018    Procedure: BILATERAL TOTAL MASTECTOMY WITH RIGHT SENTINEL LYMPH MARTINEZ BIOPSY;  Surgeon: Kwaku Gudino MD;  Location: [facility] OR;  Service: General   • OOPHORECTOMY Bilateral 1989   • VENOUS ACCESS DEVICE (PORT) INSERTION Left 9/14/2018    Procedure: INSERTION VENOUS ACCESS DEVICE;  Surgeon: Kwaku Gudino MD;  Location: [facility];  Service: General      ONCOLOGIC HISTORY;  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  The patient had never had a previous mammogram and was referred for evaluation at which time she was found to have a 4.2 cm mass in the upper outer quadrant of the right breast at the 9:30 position associated with pleomorphic calcifications in the lower inner quadrant also felt to be suspicious left breast was benign.  Patient underwent ultrasound guided biopsy on 6/15/18 with the biopsy at the 9:30 position with clip placement showing  invasive mammary carcinoma ductal type low-grade ER % TX 9800% HER-2 2+ negative by fish.  Patient also underwent biopsy at the 4:00 position which showed a microscopic focus of invasive ductal carcinoma felt to be probably a lymphatic space involvement and was too small to do additional testing and a clip was placed.  Patient went on to have bilateral breast MRIs and was referred to Dr. Gudino MRIs confirmed 2 areas of abnormality at the breast-lower inner quadrant showed a large hematoma with a clip identified and there is extensive clumped abnormal enhancement beginning superior to the biopsy cavity and extending laterally to the entire upper half of the right breast into the upper outer quadrant measuring 1.2 x 1.4 cm and extending over 7 cm.  A biopsy clip was identified in this area the abnormal enhancement focally abutted the pectoralis muscle medially with no invasion through few borderline enlarged right axillary nodes with normal morphology no internal mammary adenopathy was noted left breast was benign.  Patient went on to have bilateral mastectomies the final path report showed 2 separate primaries in the breast associated with extensive high-grade DCIS measuring 6 cm  The lower inner quadrant lesion measured 1 cm was a grade 3 invasive ductal carcinoma ER 0 TX 0 HER-2 3+ and the lesion at the 9:30 position was a grade 1 invasive ductal carcinoma measuring 8 mm strongly ER/TX positive and HER-2 negative based on preop testing.  3 sentinel nodes were obtained 1 showed isolated tumor cells.  Margins showed  Positivity with DCIS in the inferior medial margin and was close to the deep margin (2mm) and inferior superficial margin(3mm).  Lymphovascular invasion was present.  Left breast is benign    Postoperatively she's done well and had her expanders filled and is here today to discuss adjuvant treatment.  Unfortunately she did not know about the testing on the second tumor at surgery and thought she  had a grade 1 ER/IN positive HER-2 negative tumor and was very surprised when I told her the prior markers on the second tumor.    She is  0 para 0 menarche was at age 13 menopause at age 33 when she had a hysterectomy and oophorectomy.  She's been on hormone replacement since age of 33 until her recent diagnosis .    Family history is positive for mother having colon cancer age 72 maternal aunt with breast cancer in her 60s and another maternal aunt with uterine cancer in her 70s.  She's had negative genetic testing but the extended panel is being done and is pending    We discussed treatment plans and I have scheduled her for Chemotherapy education with tentative plans for weekly Taxol Herceptin for 12 weeks followed by Herceptin for a year  We will do an Oncotype DX score on the hormone positive tumor which is low-grade unlikely to be high risk    We discussed whether or not radiation would be indicated and except for the fact that there is a positive margin with high-grade DCIS normally she would not need radiation but I recommended a consultation because of the margins and the high grade nature of the DCIS.    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  She is presented at the multidiscipline conference and everybody felt comfortable with weekly Taxol Herceptin for  12 weeks followed by Herceptin for a year and Oncotype testing on the second tumor for which she would receive an aromatase inhibitor for 5 years unless it was high risk which is very unlikely    She is here today having had an echo which showed an adequate ejection fraction  Of 61% and she had a port placed and is here to start treatment.    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    Unfortunately the wrong specimen was sent for Oncotype testing and ER negative specimen was sent and I talked to the pathologist and they will send the correct specimen for Oncotype DX testing with no talked to the patient.    That he has no questions and is happy to finally get started with therapy.    Genetic testing results are negative using the INVITAE  Panel    1/19  She is already having pain in her left knee and wants to see an orthopedic surgeon for this pain is pretty sharp and she is taking Celebrex to help deal with this    We discussed the side effects of Arimidex including hot flashes and joint pains but she is wanting to do this rather than tamoxifen because she does not like the side effect profile of tamoxifen    She's never had a bone density and we will order this also and make sure it is adequate    She has no significant neuropathy and she is happy to be off the Taxol-she tolerated the Herceptin alone well but wants to cut back on the Benadryl because it makes her too sleepy    She lost a lot of hair in the chronic for him because her [place] cooling Did not fit well on the vertex of her skull    She reports mild blurry vision that started approximately after the 10th dose of Taxol and her ophthalmologist as she has cataracts that are affecting the macular part of her  iron she needs to have surgeries sometime in the next month.  I told her that Herceptin does not cause this and all the  steroid premeds may have accelerated to cataract          Current Outpatient Medications on File Prior to Visit   Medication Sig Dispense Refill   • chlorthalidone (HYGROTON) 25 MG tablet Take 25 mg by mouth Every Night.     • exemestane (AROMASIN) 25 MG chemo tablet Take 1 tablet by mouth Daily. 90 tablet 3   • fexofenadine-pseudoephedrine (ALLEGRA-D 24) 180-240 MG per 24 hr tablet Take 1 tablet by mouth Daily.     • omeprazole-sodium bicarbonate (ZEGERID)  MG per capsule Take 1 capsule by mouth Every Night.     • potassium chloride (MICRO-K) 10 MEQ CR capsule      • Turmeric 500 MG tablet Take 1,000 mg by mouth Daily.     • [DISCONTINUED] PREVIDENT 5000 DRY MOUTH 1.1 % gel        No current facility-administered medications on file prior to visit.         ALLERGIES:    Allergies   Allergen Reactions   • Moxifloxacin Anaphylaxis and Rash   • Aloxi [Palonosetron Hcl] Itching   • Latex Rash     Added based on information entered during log entry, please review and add reactions, type, and severity as needed        Social History     Socioeconomic History   • Marital status:      Spouse name: FLORINA Gross   • Number of children: 0   • Years of education: Graduate School   • Highest education level: Not on file   Occupational History   • Occupation: Physician     Employer: [employer]   Tobacco Use   • Smoking status: Never Smoker   • Smokeless tobacco: Never Used   Substance and Sexual Activity   • Alcohol use: Yes     Types: 1 Glasses of wine per week   • Drug use: No   • Sexual activity: Defer     Comment:         Family History   Problem Relation Age of Onset   • Colon cancer Mother 72   • Breast cancer Maternal Aunt 60   • Uterine cancer Maternal Aunt 75   • Stroke Father    • Malig Hyperthermia Neg Hx         Review of Systems   Constitutional:  "Positive for diaphoresis.   Gastrointestinal:        Chronic indigestion   Endocrine: Positive for heat intolerance.   Neurological: 1-st1stgstrstastdstest numbness.    Left hip pain 6/10 taking Januvia    Objective     Vitals:    03/27/20 0936   BP: 113/74   Pulse: 98   Resp: 14   Temp: 98.2 °F (36.8 °C)   TempSrc: Oral   SpO2: 100%   Weight: 61.8 kg (136 lb 3.2 oz)   Height: 162.6 cm (64.02\")   PainSc: 4  Comment: joint pain     Current Status 3/27/2020   ECOG score 0       Physical Exam    GENERAL:  Well-developed, well-nourished in no acute distress.   SKIN:  Warm, dry without rashes, purpura or petechiae.  EYES:  Pupils equal, round and reactive to light.  EOMs intact.  Conjunctivae normal. Mild opacity of the right eye.   EARS:  Hearing intact.  NOSE:  Septum midline.  No excoriations or nasal discharge.  MOUTH:  Tongue is well-papillated; no stomatitis or ulcers.  Lips normal.  THROAT:  Oropharynx without lesions or exudates.  NECK:  Supple with good range of motion; no thyromegaly or masses, no JVD.  LYMPHATICS:  No cervical, supraclavicular, axillary or inguinal adenopathy.  CHEST:  Lungs clear to auscultation. Good airflow.  BREATS: Left breast is benign with an implant nipple intact the right breast has an implant in place   CARDIAC:  Regular rate and rhythm without murmurs, rubs or gallops. Normal S1,S2.  ABDOMEN:  Soft, nontender with no hepatosplenomegaly or masses.  EXTREMITIES:  No clubbing, cyanosis or edema.  No obvious fluid or warmth in the left knee  NEUROLOGICAL:  Cranial Nerves II-XII grossly intact.  No focal neurological deficits.  PSYCHIATRIC:  Normal affect and mood.        RECENT LABS:  Hematology WBC   Date Value Ref Range Status   12/06/2019 5.64 3.40 - 10.80 10*3/mm3 Final     RBC   Date Value Ref Range Status   12/06/2019 4.90 3.77 - 5.28 10*6/mm3 Final     Hemoglobin   Date Value Ref Range Status   12/06/2019 12.4 12.0 - 15.9 g/dL Final     Hematocrit   Date Value Ref Range Status   12/06/2019 " 39.7 34.0 - 46.6 % Final     Platelets   Date Value Ref Range Status   12/06/2019 328 140 - 450 10*3/mm3 Final        SYNOPTIC REPORT (Based on [facility] Cancer Case Summary, version January 2018):                Procedure: Total mastectomy.               Specimen/tumor laterality: Right.                Tumor site: Lower inner quadrant and central/inferior breast.                 Tumor size (greastest dimension of largest invasive focus): 1 cm (lower inner                       quadrant tumor).               Histologic type: Invasive carcinoma of no special type (ductal, not otherwise specified).                 Histologic grade (Rebeca Histologic Score):                              Glandular (acinar)/tubular differentiation: Score 3.                             Nuclear pleomorphism: Score 3.                             Mitotic rate: Score 2.                            Overall grade: Grade 3 (score 8 of 9).                            NOTE: The above Rebeca Histologic Score applies to the largest and                                    highest grade tumor (lower inner quadrant tumor).  The tumor in the                                    central/inferior breast is intermediate grade (tubular score 3, nuclear                                    score 2, mitotic score 1).               Tumor focality: Multiple foci of invasive carcinoma.                             Number of foci: 2.                              Sizes of individual foci: 1 cm (lower inner quadrant tumor) and approximately                                    0.8 cm (central/inferior tumor).               Ductal carcinoma in situ (DCIS): Present, positive for extensive intraductal component.                            Size (extent) of DCIS: Approximately greater than 6 cm (estimated based                                    on gross and microscopic findings).                            Architectural pattern: Solid and cribriform.                             Nuclear grade: Grade III (high).                            Necrosis: Central comedonecrosis.               Lobular carcinoma in situ (LCIS): No LCIS in specimen.               Margins:                             Invasive carcinoma margins: Uninvolved by invasive carcinoma.                                          Distance from closest margin: 2 mm (deep margin).                             DCIS margins: Positive for DCIS (inferior medial margin [lower inner quadrant]).                                          Additional DCIS margins: DCIS extends to within 2 mm of deep margin                                                 and to within 3 mm of inferior superficial soft tissue margin.  Atypical                                                 ductal hyperplasia is focally present at the deep margin.               Regional lymph nodes: Involved by tumor cells.                             Number of lymph nodes with macrometastases: 0.                            Number of lymph nodes with micrometastases: 0.                            Number of lymph nodes with isolated tumor cells: 1.                            Number of lymph nodes examined: 3.                             Number of sentinel lymph nodes examined: 3.                Treatment effect: No known presurgical therapy.                Lymphovascular invasion: Present.               Dermal lymphovascular invasion: Not identified.                Pathologic stage classification:                             TNM descriptors: m (multiple foci of invasive carcinoma).                             Primary tumor: pT1b.                            Regional lymph nodes: pN0(i+)(sn).                             Distant metastasis: Not applicable.                Additional pathologic findings:                              Ductal hyperplasia of the usual type.                              Fibroinflammatory changes consistent with sites of prior biopsy (2 prior biopsy                                     sites).                 Ancillary studies:ER IL negative HER-2 positive lower inner quadrant tumor    Echocardiogram  Reading physician: Keily Jensen MD Ordering physician: Cecy Treviño MD Study date: 12/17/18     Study Description     A two-dimensional transthoracic echocardiogram with color flow and Doppler was performed. The study is technically difficult for diagnosis.Verbal consent was obtained from the patient to use Definity contrast in order to optimize the study. The use of Definity was indicated as two or more contiguous segments of the left ventricular endocardial border were unable to be adequately visualized by standard imaging methods. 1.3 mL of mechanically activated Definity was mixed with 8.7 mL of normal saline. A total of 2 mL of the resulting Definity solution was administered, and the remaining contrast was wasted and discarded.   No adverse reaction to contrast was noted.   9/14/18 EF 61% No GLS     DEXA BONE DENSITY AXIAL  FINDINGS: Average lumbar bone mineral density 1.0 g/cm sq correlating to  a T value of -0.2 and a Z score of 1.4. Right femoral neck bone mineral  density 0.77 g/cm sq correlating to a T value of -0.7 and a Z score of  0.5. Left femoral neck bone mineral density 0.79 g/cm sq correlating to  a T value of -0.6 and a Z score of 0.6.     CONCLUSION: Normal bone mineral density.     This report was finalized on 3/18/2019      Echocardiogram  Study Description     A two-dimensional transthoracic echocardiogram with color flow and Doppler was performed.   The study is technically difficult for diagnosis. The quality of the study is limited due to poor acoustic windows related to breast implants. Verbal consent was obtained from the patient to use Definity contrast in order to optimize the study. Note also that contrast was used to enhance ejection fraction reproducability of the LV ejection fraction on serial echocardiograms.  The use of Definity was  indicated as two or more contiguous segments of the left ventricular endocardial border were unable to be adequately visualized by standard imaging methods. 1.3 mL of mechanically activated Definity was mixed with 8.7 mL of normal saline. A total of 2 mL of the resulting Definity solution was administered, and the remaining contrast was wasted and discarded.   No adverse reaction to contrast was noted.   No GLS obtained due to breast implants and spacers  12/17/18  EF=57%    09/14/18  EF=61%.   Electronically signed by Keily Jensen MD on 3/15/19 at 1713 EDT       FINDINGS: Average lumbar bone mineral density 1.0 g/cm sq correlating to  a T value of -0.2 and a Z score of 1.4. Right femoral neck bone mineral  density 0.77 g/cm sq correlating to a T value of -0.7 and a Z score of  0.5. Left femoral neck bone mineral density 0.79 g/cm sq correlating to  a T value of -0.6 and a Z score of 0.6.     CONCLUSION: Normal bone mineral density.     6/7/19  · Estimated EF = 67%.  · Left ventricular systolic function is normal.  · No change from prior study       Interpretation Summary     · Left ventricular systolic function is normal.  · Calculated EF = 68%.  · C/w prior study 3/15/19, there is no change.      Electronically signed by Naomi Mars MD on 8/30/19 at 1555 EDT    DEXA BONE DENSITY AXIAL     CONCLUSION: Normal bone mineral density.     This report was finalized on 3/18/2019 6:11 AM by Dr. Da Maxwell M.D.            Assessment/Plan   1.  Multifocal right breast cancer T1 BN(ic)-isolated tumor cells medial tumor being grade 3 ER/WA negative HER-2 3+ lateral tumor being T1b N0 grade 1 ER/WA positive HER-2 2+ negative fish Oncotype score of 4-post bilateral mastectomy nipple sparing on the left  ·   Positive family history of multiple malignancies genetic testing negative  · Weekly Taxol Herceptin started 9/28/2018 ×12 planned followed by aromatase inhibitor for her second tumor  · Arimidex started in  2/19-intolerant switch to Aromasin in 4/19    2.  Wound dehiscence no signs of infection resutured-expander removed in 3/19 with permanent implant placed    3. Blurry vision.  Cataract surgery in 3 weeks vision improved    4. Left hip pain referral to orthopedics planned -degenerative disease of the hip aggravated by Arimidex-joint pain improved with Januvia    Plan  1.  Continue Aromasin 25 mg to continue  as adjuvant therapy for her second tumor   2.  If her joint pains worsen she could consider switching to tamoxifen for her low risk ER positive tumor  3.. Return in 6 months for follow-up

## 2020-03-27 ENCOUNTER — APPOINTMENT (OUTPATIENT)
Dept: ONCOLOGY | Facility: HOSPITAL | Age: 65
End: 2020-03-27

## 2020-03-27 ENCOUNTER — APPOINTMENT (OUTPATIENT)
Dept: LAB | Facility: HOSPITAL | Age: 65
End: 2020-03-27

## 2020-03-27 ENCOUNTER — OFFICE VISIT (OUTPATIENT)
Dept: ONCOLOGY | Facility: CLINIC | Age: 65
End: 2020-03-27

## 2020-03-27 VITALS
RESPIRATION RATE: 14 BRPM | OXYGEN SATURATION: 100 % | WEIGHT: 136.2 LBS | TEMPERATURE: 98.2 F | HEART RATE: 98 BPM | SYSTOLIC BLOOD PRESSURE: 113 MMHG | BODY MASS INDEX: 23.25 KG/M2 | HEIGHT: 64 IN | DIASTOLIC BLOOD PRESSURE: 74 MMHG

## 2020-03-27 DIAGNOSIS — C50.811 MALIGNANT NEOPLASM OF OVERLAPPING SITES OF RIGHT BREAST IN FEMALE, ESTROGEN RECEPTOR POSITIVE (HCC): Primary | ICD-10-CM

## 2020-03-27 DIAGNOSIS — Z17.0 MALIGNANT NEOPLASM OF OVERLAPPING SITES OF RIGHT BREAST IN FEMALE, ESTROGEN RECEPTOR POSITIVE (HCC): Primary | ICD-10-CM

## 2020-03-27 PROCEDURE — 99213 OFFICE O/P EST LOW 20 MIN: CPT | Performed by: INTERNAL MEDICINE

## 2020-09-24 ENCOUNTER — TELEPHONE (OUTPATIENT)
Dept: ONCOLOGY | Facility: CLINIC | Age: 65
End: 2020-09-24

## 2020-09-24 NOTE — TELEPHONE ENCOUNTER
Patient called can her appt for 10/2 be moved to tomorrow 9/25 in the afternoon, she is going to be out of town  Best call back number [phone]

## 2020-10-02 ENCOUNTER — LAB (OUTPATIENT)
Dept: LAB | Facility: HOSPITAL | Age: 65
End: 2020-10-02

## 2020-10-02 ENCOUNTER — OFFICE VISIT (OUTPATIENT)
Dept: ONCOLOGY | Facility: CLINIC | Age: 65
End: 2020-10-02

## 2020-10-02 VITALS
BODY MASS INDEX: 22.67 KG/M2 | SYSTOLIC BLOOD PRESSURE: 129 MMHG | HEIGHT: 64 IN | TEMPERATURE: 97.3 F | WEIGHT: 132.8 LBS | HEART RATE: 101 BPM | DIASTOLIC BLOOD PRESSURE: 75 MMHG | RESPIRATION RATE: 22 BRPM | OXYGEN SATURATION: 98 %

## 2020-10-02 DIAGNOSIS — D64.9 ANEMIA, UNSPECIFIED TYPE: Primary | ICD-10-CM

## 2020-10-02 DIAGNOSIS — Z79.899 ENCOUNTER FOR LONG-TERM (CURRENT) USE OF HIGH-RISK MEDICATION: ICD-10-CM

## 2020-10-02 DIAGNOSIS — Z17.0 MALIGNANT NEOPLASM OF OVERLAPPING SITES OF RIGHT BREAST IN FEMALE, ESTROGEN RECEPTOR POSITIVE (HCC): ICD-10-CM

## 2020-10-02 DIAGNOSIS — C50.811 MALIGNANT NEOPLASM OF OVERLAPPING SITES OF RIGHT BREAST IN FEMALE, ESTROGEN RECEPTOR POSITIVE (HCC): ICD-10-CM

## 2020-10-02 DIAGNOSIS — C50.811 MALIGNANT NEOPLASM OF OVERLAPPING SITES OF RIGHT BREAST IN FEMALE, ESTROGEN RECEPTOR POSITIVE (HCC): Primary | ICD-10-CM

## 2020-10-02 DIAGNOSIS — Z17.0 MALIGNANT NEOPLASM OF OVERLAPPING SITES OF RIGHT BREAST IN FEMALE, ESTROGEN RECEPTOR POSITIVE (HCC): Primary | ICD-10-CM

## 2020-10-02 LAB
ALBUMIN SERPL-MCNC: 4.7 G/DL (ref 3.5–5.2)
ALBUMIN/GLOB SERPL: 1.6 G/DL (ref 1.1–2.4)
ALP SERPL-CCNC: 96 U/L (ref 38–116)
ALT SERPL W P-5'-P-CCNC: 14 U/L (ref 0–33)
ANION GAP SERPL CALCULATED.3IONS-SCNC: 11.3 MMOL/L (ref 5–15)
AST SERPL-CCNC: 18 U/L (ref 0–32)
BASOPHILS # BLD AUTO: 0.04 10*3/MM3 (ref 0–0.2)
BASOPHILS NFR BLD AUTO: 0.7 % (ref 0–1.5)
BILIRUB SERPL-MCNC: 0.3 MG/DL (ref 0.2–1.2)
BUN SERPL-MCNC: 15 MG/DL (ref 6–20)
BUN/CREAT SERPL: 16.5 (ref 7.3–30)
CALCIUM SPEC-SCNC: 10 MG/DL (ref 8.5–10.2)
CHLORIDE SERPL-SCNC: 99 MMOL/L (ref 98–107)
CO2 SERPL-SCNC: 30.7 MMOL/L (ref 22–29)
CREAT SERPL-MCNC: 0.91 MG/DL (ref 0.6–1.1)
DEPRECATED RDW RBC AUTO: 44.3 FL (ref 37–54)
EOSINOPHIL # BLD AUTO: 0.14 10*3/MM3 (ref 0–0.4)
EOSINOPHIL NFR BLD AUTO: 2.4 % (ref 0.3–6.2)
ERYTHROCYTE [DISTWIDTH] IN BLOOD BY AUTOMATED COUNT: 15.1 % (ref 12.3–15.4)
FERRITIN SERPL-MCNC: 22.7 NG/ML (ref 13–150)
GFR SERPL CREATININE-BSD FRML MDRD: 62 ML/MIN/1.73
GFR SERPL CREATININE-BSD FRML MDRD: 76 ML/MIN/1.73
GLOBULIN UR ELPH-MCNC: 2.9 GM/DL (ref 1.8–3.5)
GLUCOSE SERPL-MCNC: 166 MG/DL (ref 74–124)
HCT VFR BLD AUTO: 43.6 % (ref 34–46.6)
HGB BLD-MCNC: 13.6 G/DL (ref 12–15.9)
IMM GRANULOCYTES # BLD AUTO: 0.02 10*3/MM3 (ref 0–0.05)
IMM GRANULOCYTES NFR BLD AUTO: 0.3 % (ref 0–0.5)
LYMPHOCYTES # BLD AUTO: 1.84 10*3/MM3 (ref 0.7–3.1)
LYMPHOCYTES NFR BLD AUTO: 31.3 % (ref 19.6–45.3)
MCH RBC QN AUTO: 25.4 PG (ref 26.6–33)
MCHC RBC AUTO-ENTMCNC: 31.2 G/DL (ref 31.5–35.7)
MCV RBC AUTO: 81.5 FL (ref 79–97)
MONOCYTES # BLD AUTO: 0.37 10*3/MM3 (ref 0.1–0.9)
MONOCYTES NFR BLD AUTO: 6.3 % (ref 5–12)
NEUTROPHILS NFR BLD AUTO: 3.47 10*3/MM3 (ref 1.7–7)
NEUTROPHILS NFR BLD AUTO: 59 % (ref 42.7–76)
NRBC BLD AUTO-RTO: 0 /100 WBC (ref 0–0.2)
PLATELET # BLD AUTO: 313 10*3/MM3 (ref 140–450)
PMV BLD AUTO: 9.5 FL (ref 6–12)
POTASSIUM SERPL-SCNC: 3.7 MMOL/L (ref 3.5–4.7)
PROT SERPL-MCNC: 7.6 G/DL (ref 6.3–8)
RBC # BLD AUTO: 5.35 10*6/MM3 (ref 3.77–5.28)
SODIUM SERPL-SCNC: 141 MMOL/L (ref 134–145)
WBC # BLD AUTO: 5.88 10*3/MM3 (ref 3.4–10.8)

## 2020-10-02 PROCEDURE — 36415 COLL VENOUS BLD VENIPUNCTURE: CPT

## 2020-10-02 PROCEDURE — 99213 OFFICE O/P EST LOW 20 MIN: CPT | Performed by: INTERNAL MEDICINE

## 2020-10-02 PROCEDURE — 80053 COMPREHEN METABOLIC PANEL: CPT

## 2020-10-02 PROCEDURE — 82728 ASSAY OF FERRITIN: CPT

## 2020-10-02 PROCEDURE — 85025 COMPLETE CBC W/AUTO DIFF WBC: CPT

## 2020-10-02 RX ORDER — CHLORTHALIDONE 25 MG/1
25 TABLET ORAL NIGHTLY
Qty: 30 TABLET | Refills: 6 | Status: SHIPPED | OUTPATIENT
Start: 2020-10-02 | End: 2021-10-15

## 2020-10-02 RX ORDER — EXEMESTANE 25 MG/1
25 TABLET ORAL DAILY
Qty: 90 TABLET | Refills: 3 | Status: SHIPPED | OUTPATIENT
Start: 2020-10-02 | End: 2021-10-15 | Stop reason: SDUPTHER

## 2021-03-22 ENCOUNTER — BULK ORDERING (OUTPATIENT)
Dept: CASE MANAGEMENT | Facility: OTHER | Age: 66
End: 2021-03-22

## 2021-03-22 DIAGNOSIS — Z23 IMMUNIZATION DUE: ICD-10-CM

## 2021-03-26 ENCOUNTER — TELEPHONE (OUTPATIENT)
Dept: ONCOLOGY | Facility: CLINIC | Age: 66
End: 2021-03-26

## 2021-03-26 ENCOUNTER — OFFICE VISIT (OUTPATIENT)
Dept: ONCOLOGY | Facility: CLINIC | Age: 66
End: 2021-03-26

## 2021-03-26 ENCOUNTER — LAB (OUTPATIENT)
Dept: LAB | Facility: HOSPITAL | Age: 66
End: 2021-03-26

## 2021-03-26 VITALS
BODY MASS INDEX: 22.84 KG/M2 | WEIGHT: 133.8 LBS | OXYGEN SATURATION: 96 % | SYSTOLIC BLOOD PRESSURE: 119 MMHG | HEART RATE: 95 BPM | HEIGHT: 64 IN | DIASTOLIC BLOOD PRESSURE: 80 MMHG | TEMPERATURE: 97.8 F | RESPIRATION RATE: 16 BRPM

## 2021-03-26 DIAGNOSIS — C50.811 MALIGNANT NEOPLASM OF OVERLAPPING SITES OF RIGHT BREAST IN FEMALE, ESTROGEN RECEPTOR POSITIVE (HCC): ICD-10-CM

## 2021-03-26 DIAGNOSIS — Z17.0 MALIGNANT NEOPLASM OF OVERLAPPING SITES OF RIGHT BREAST IN FEMALE, ESTROGEN RECEPTOR POSITIVE (HCC): ICD-10-CM

## 2021-03-26 DIAGNOSIS — C50.811 MALIGNANT NEOPLASM OF OVERLAPPING SITES OF RIGHT BREAST IN FEMALE, ESTROGEN RECEPTOR POSITIVE (HCC): Primary | ICD-10-CM

## 2021-03-26 DIAGNOSIS — Z17.0 MALIGNANT NEOPLASM OF OVERLAPPING SITES OF RIGHT BREAST IN FEMALE, ESTROGEN RECEPTOR POSITIVE (HCC): Primary | ICD-10-CM

## 2021-03-26 LAB
ALBUMIN SERPL-MCNC: 4.7 G/DL (ref 3.5–5.2)
ALBUMIN/GLOB SERPL: 1.4 G/DL (ref 1.1–2.4)
ALP SERPL-CCNC: 104 U/L (ref 38–116)
ALT SERPL W P-5'-P-CCNC: 17 U/L (ref 0–33)
ANION GAP SERPL CALCULATED.3IONS-SCNC: 12.4 MMOL/L (ref 5–15)
AST SERPL-CCNC: 18 U/L (ref 0–32)
BASOPHILS # BLD AUTO: 0.05 10*3/MM3 (ref 0–0.2)
BASOPHILS NFR BLD AUTO: 0.8 % (ref 0–1.5)
BILIRUB SERPL-MCNC: 0.3 MG/DL (ref 0.2–1.2)
BUN SERPL-MCNC: 18 MG/DL (ref 6–20)
BUN/CREAT SERPL: 19.4 (ref 7.3–30)
CALCIUM SPEC-SCNC: 10.3 MG/DL (ref 8.5–10.2)
CHLORIDE SERPL-SCNC: 98 MMOL/L (ref 98–107)
CO2 SERPL-SCNC: 30.6 MMOL/L (ref 22–29)
CREAT SERPL-MCNC: 0.93 MG/DL (ref 0.6–1.1)
DEPRECATED RDW RBC AUTO: 42.3 FL (ref 37–54)
EOSINOPHIL # BLD AUTO: 0.34 10*3/MM3 (ref 0–0.4)
EOSINOPHIL NFR BLD AUTO: 5.4 % (ref 0.3–6.2)
ERYTHROCYTE [DISTWIDTH] IN BLOOD BY AUTOMATED COUNT: 14.1 % (ref 12.3–15.4)
GFR SERPL CREATININE-BSD FRML MDRD: 61 ML/MIN/1.73
GFR SERPL CREATININE-BSD FRML MDRD: 74 ML/MIN/1.73
GLOBULIN UR ELPH-MCNC: 3.3 GM/DL (ref 1.8–3.5)
GLUCOSE SERPL-MCNC: 108 MG/DL (ref 74–124)
HCT VFR BLD AUTO: 43.2 % (ref 34–46.6)
HGB BLD-MCNC: 13.6 G/DL (ref 12–15.9)
IMM GRANULOCYTES # BLD AUTO: 0.01 10*3/MM3 (ref 0–0.05)
IMM GRANULOCYTES NFR BLD AUTO: 0.2 % (ref 0–0.5)
LYMPHOCYTES # BLD AUTO: 2.4 10*3/MM3 (ref 0.7–3.1)
LYMPHOCYTES NFR BLD AUTO: 38 % (ref 19.6–45.3)
MCH RBC QN AUTO: 26.2 PG (ref 26.6–33)
MCHC RBC AUTO-ENTMCNC: 31.5 G/DL (ref 31.5–35.7)
MCV RBC AUTO: 83.2 FL (ref 79–97)
MONOCYTES # BLD AUTO: 0.41 10*3/MM3 (ref 0.1–0.9)
MONOCYTES NFR BLD AUTO: 6.5 % (ref 5–12)
NEUTROPHILS NFR BLD AUTO: 3.1 10*3/MM3 (ref 1.7–7)
NEUTROPHILS NFR BLD AUTO: 49.1 % (ref 42.7–76)
NRBC BLD AUTO-RTO: 0 /100 WBC (ref 0–0.2)
PLATELET # BLD AUTO: 319 10*3/MM3 (ref 140–450)
PMV BLD AUTO: 8.8 FL (ref 6–12)
POTASSIUM SERPL-SCNC: 3.6 MMOL/L (ref 3.5–4.7)
PROT SERPL-MCNC: 8 G/DL (ref 6.3–8)
RBC # BLD AUTO: 5.19 10*6/MM3 (ref 3.77–5.28)
SODIUM SERPL-SCNC: 141 MMOL/L (ref 134–145)
WBC # BLD AUTO: 6.31 10*3/MM3 (ref 3.4–10.8)

## 2021-03-26 PROCEDURE — 99214 OFFICE O/P EST MOD 30 MIN: CPT | Performed by: INTERNAL MEDICINE

## 2021-03-26 PROCEDURE — 85025 COMPLETE CBC W/AUTO DIFF WBC: CPT

## 2021-03-26 PROCEDURE — 36415 COLL VENOUS BLD VENIPUNCTURE: CPT

## 2021-03-26 PROCEDURE — 80053 COMPREHEN METABOLIC PANEL: CPT

## 2021-03-26 RX ORDER — FEXOFENADINE HCL AND PSEUDOEPHEDRINE HCI 60; 120 MG/1; MG/1
1 TABLET, EXTENDED RELEASE ORAL
COMMUNITY
Start: 2020-10-22 | End: 2021-10-15

## 2021-03-26 RX ORDER — POTASSIUM CHLORIDE 750 MG/1
20 CAPSULE, EXTENDED RELEASE ORAL DAILY
Qty: 180 CAPSULE | Refills: 3 | Status: SHIPPED | OUTPATIENT
Start: 2021-03-26 | End: 2022-05-23

## 2021-03-26 NOTE — PROGRESS NOTES
Subjective     REASON FOR CONSULTATION:    1.Multifocal right breast cancer post bilateral mastectomies- T1b N0+-10:00 tumor 8 mm grade 1 ER/LA positive HER-2 negative Oncotype 4  2.  5:00 tumor right breast ER/LA negative HER-2 positive 10 mm in size  3.  Negative genetic testing  4.  Adequate echo weekly Taxol Herceptin started on 9/21/18                          Referring physician Kwaku Gudino M.D.                            History of Present Illness : This is a 62-year-old lady with a multifocal right breast cancer T1b N0+ ER/LA negative HER-2 positive and the second T1b ER/LA positive HER-2 negative tumor here for follow-up after completing maintenance Herceptin.      She was started on Arimidex  for her second tumor which was a low risk hormone positive tumor.  But after 2 months the pain in her hip and shin became unbearable and we switched to Aromasin which she has done very well overall.    She is tolerating the Aromasin well at this point and continuing on it without any problems    She saw the orthopedic doctor who thought she needed her left hip replaced and she does not want to do this at the time she tells me Januvia helps significantly with joint pains and she is doing this     She denies headaches, speech disturbances, photophobia, new neuropathy.     Her appetite is good. Her bowels and bladder are working well.     She has had her cataract surgeries and her vision is much improved    She is in  [facility] and she will continue follow-up here for the time being    Past Medical History:   Diagnosis Date   • Environmental allergies    • GERD (gastroesophageal reflux disease)    • History of foreign travel     [place]   • Hypertension    • Malignant neoplasm of overlapping sites of right breast in female, estrogen receptor positive (CMS/HCC) 7/18/2018        Past Surgical History:   Procedure Laterality Date   • BREAST BIOPSY Right 2018    Malignant   • BREAST RECONSTRUCTION  Bilateral 8/1/2018    Procedure: BILATERAL BREAST TISSUE EXPANDER INSERTION WITH ALLODERM;  Surgeon: Waterhouse, Maurine, MD;  Location: [facility] MAIN OR;  Service: Plastics   • BREAST TISSUE EXPANDER REMOVAL INSERTION OF IMPLANT Bilateral 3/22/2019    Procedure: BILATERAL BREAST TISSUE EXPANDER REMOVAL INSERTION OF BILATERAL BREAST IMPLANT;  Surgeon: Waterhouse, Maurine, MD;  Location: [facility] OR OSC;  Service: Plastics   • COLONOSCOPY N/A 3/24/2017    Procedure: COLONOSCOPY TO CECUM AND TI;  Surgeon: Aleksander Bella MD;  Location: [facility] ENDOSCOPY;  Service:    • ENDOSCOPY N/A 3/24/2017    Procedure: ESOPHAGOGASTRODUODENOSCOPY;  Surgeon: Aleksander Bella MD;  Location: [facility] ENDOSCOPY;  Service:    • EYE SURGERY Right 05/01/2019    lens transplant   • HYSTERECTOMY  1989   • MASTECTOMY W/ SENTINEL NODE BIOPSY Bilateral 8/1/2018    Procedure: BILATERAL TOTAL MASTECTOMY WITH RIGHT SENTINEL LYMPH MARTINEZ BIOPSY;  Surgeon: Kwaku Gudino MD;  Location: 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  The patient had never had a previous mammogram and was referred for evaluation at which time she was found to have a 4.2 cm mass in the upper outer quadrant of the right breast at the 9:30 position associated with pleomorphic calcifications in the lower inner quadrant also felt to be suspicious left breast was benign.  Patient underwent ultrasound guided biopsy on 6/15/18 with the biopsy at the 9:30 position with clip placement showing invasive mammary carcinoma ductal type low-grade ER % MT 9800% HER-2 2+ negative by fish.  Patient also  underwent biopsy at the 4:00 position which showed a microscopic focus of invasive ductal carcinoma felt to be probably a lymphatic space involvement and was too small to do additional testing and a clip was placed.  Patient went on to have bilateral breast MRIs and was referred to Dr. Gudino MRIs confirmed 2 areas of abnormality at the breast-lower inner quadrant showed a large hematoma with a clip identified and there is extensive clumped abnormal enhancement beginning superior to the biopsy cavity and extending laterally to the entire upper half of the right breast into the upper outer quadrant measuring 1.2 x 1.4 cm and extending over 7 cm.  A biopsy clip was identified in this area the abnormal enhancement focally abutted the pectoralis muscle medially with no invasion through few borderline enlarged right axillary nodes with normal morphology no internal mammary adenopathy was noted left breast was benign.  Patient went on to have bilateral mastectomies the final path report showed 2 separate primaries in the breast associated with extensive high-grade DCIS measuring 6 cm  The lower inner quadrant lesion measured 1 cm was a grade 3 invasive ductal carcinoma ER 0 AL 0 HER-2 3+ and the lesion at the 9:30 position was a grade 1 invasive ductal carcinoma measuring 8 mm strongly ER/AL positive and HER-2 negative based on preop testing.  3 sentinel nodes were obtained 1 showed isolated tumor cells.  Margins showed  Positivity with DCIS in the inferior medial margin and was close to the deep margin (2mm) and inferior superficial margin(3mm).  Lymphovascular invasion was present.  Left breast is benign    Postoperatively she's done well and had her expanders filled and is here today to discuss adjuvant treatment.  Unfortunately she did not know about the testing on the second tumor at surgery and thought she had a grade 1 ER/AL positive HER-2 negative tumor and was very surprised when I told her the prior markers on  the second tumor.    She is  0 para 0 menarche was at age 13 menopause at age 33 when she had a hysterectomy and oophorectomy.  She's been on hormone replacement since age of 33 until her recent diagnosis .    Family history is positive for mother having colon cancer age 72 maternal aunt with breast cancer in her 60s and another maternal aunt with uterine cancer in her 70s.  She's had negative genetic testing but the extended panel is being done and is pending    We discussed treatment plans and I have scheduled her for Chemotherapy education with tentative plans for weekly Taxol Herceptin for 12 weeks followed by Herceptin for a year  We will do an Oncotype DX score on the hormone positive tumor which is low-grade unlikely to be high risk    We discussed whether or not radiation would be indicated and except for the fact that there is a positive margin with high-grade DCIS normally she would not need radiation but I recommended a consultation because of the margins and the high grade nature of the DCIS.    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  She is presented at the multidiscipline conference and everybody felt comfortable with weekly Taxol Herceptin for 12 weeks followed by Herceptin for a year and Oncotype testing on the second tumor for which she would receive  an aromatase inhibitor for 5 years unless it was high risk which is very unlikely    She is here today having had an echo which showed an adequate ejection fraction  Of 61% and she had a port placed and is here to start treatment.    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    Unfortunately the wrong specimen was sent for Oncotype testing and ER negative specimen was sent and I talked to the pathologist and they will send the correct specimen for Oncotype DX testing with no talked to the patient.    That he has no questions and is happy to finally get started with therapy.    Genetic testing results are negative using the [facility]  Panel    1/19  She is already having pain in her left knee and wants to see an orthopedic surgeon for this pain is pretty sharp and she is taking Celebrex to help deal with this    We discussed the side effects of Arimidex including hot flashes and joint pains but she is wanting to do this rather than tamoxifen because she does not like the side effect profile of tamoxifen    She's never had a bone density and we will order this also and make sure it is adequate    She has no significant neuropathy and she is happy to be off the Taxol-she tolerated the Herceptin alone well but wants to cut back on the Benadryl because it makes her too sleepy    She lost a lot of hair in the chronic for him because her [employer] cooling Did not fit well on the vertex of her skull    She reports mild blurry vision that started approximately after the 10th dose of Taxol and her ophthalmologist as she has cataracts that are affecting the macular part of her iron she needs to have surgeries sometime in the next month.  I told her that Herceptin does not cause this and  all the  steroid premeds may have accelerated to cataract          Current Outpatient Medications on File Prior to Visit   Medication Sig Dispense Refill   • fexofenadine-pseudoephedrine (ALLEGRA-D)  MG per 12 hr tablet Take 1 tablet by mouth.     • chlorthalidone (HYGROTON) 25 MG tablet Take 1 tablet by mouth Every Night. 30 tablet 6   • exemestane (AROMASIN) 25 MG chemo tablet Take 1 tablet by mouth Daily. 90 tablet 3   • omeprazole-sodium bicarbonate (ZEGERID)  MG per capsule Take 1 capsule by mouth Every Night.     • potassium chloride (MICRO-K) 10 MEQ CR capsule      • Turmeric 500 MG tablet Take 1,000 mg by mouth Daily.     • [DISCONTINUED] fexofenadine-pseudoephedrine (ALLEGRA-D 24) 180-240 MG per 24 hr tablet Take 1 tablet by mouth Daily.       No current facility-administered medications on file prior to visit.        ALLERGIES:    Allergies   Allergen Reactions   • Moxifloxacin Anaphylaxis and Rash   • Aloxi [Palonosetron Hcl] Itching   • Latex Rash     Added based on information entered during log entry, please review and add reactions, type, and severity as needed        Social History     Socioeconomic History   • Marital status:      Spouse name: FLORINA Gross   • Number of children: 0   • Years of education: Graduate School   • Highest education level: Not on file   Tobacco Use   • Smoking status: Never Smoker   • Smokeless tobacco: Never Used   Substance and Sexual Activity   • Alcohol use: Yes     Types: 1 Glasses of wine per week   • Drug use: No   • Sexual activity: Defer     Comment:         Family History   Problem Relation Age of Onset   • Colon cancer Mother 72   • Breast cancer Maternal Aunt 60   • Uterine cancer Maternal Aunt 75   • Stroke Father    • Malig Hyperthermia Neg Hx         Review of Systems   Constitutional: Positive for diaphoresis.   Gastrointestinal:        Chronic indigestion   Endocrine: Positive for heat intolerance.   Neurological: 1-st1stgstrstastdstest numbness.  "   Left hip pain 6/10 taking Januvia    Objective     Vitals:    03/26/21 1322   BP: 119/80   Pulse: 95   Resp: 16   Temp: 97.8 °F (36.6 °C)   TempSrc: Skin   SpO2: 96%   Weight: 60.7 kg (133 lb 12.8 oz)   Height: 162.6 cm (64.02\")   PainSc: 0-No pain     Current Status 3/26/2021   ECOG score 0       Physical Exam    GENERAL:  Well-developed, well-nourished in no acute distress.   SKIN:  Warm, dry without rashes, purpura or petechiae.  EYES:  Pupils equal, round and reactive to light.  EOMs intact.  Conjunctivae normal. Mild opacity of the right eye.   EARS:  Hearing intact.  NOSE:  Septum midline.  No excoriations or nasal discharge.  MOUTH:  Tongue is well-papillated; no stomatitis or ulcers.  Lips normal.  THROAT:  Oropharynx without lesions or exudates.  NECK:  Supple with good range of motion; no thyromegaly or masses, no JVD.  LYMPHATICS:  No cervical, supraclavicular, axillary or inguinal adenopathy.  CHEST:  Lungs clear to auscultation. Good airflow.  BREATS: Left breast is benign with an implant nipple intact the right breast has an implant in place   CARDIAC:  Regular rate and rhythm without murmurs, rubs or gallops. Normal S1,S2.  ABDOMEN:  Soft, nontender with no hepatosplenomegaly or masses.  EXTREMITIES:  No clubbing, cyanosis or edema.  No obvious fluid or warmth in the left knee  NEUROLOGICAL:  Cranial Nerves II-XII grossly intact.  No focal neurological deficits.  PSYCHIATRIC:  Normal affect and mood.    I have reexamined the patient and the results are consistent with the previously documented exam. Martin Treviño MD     Review of Systems   All other systems reviewed and are negative.        RECENT LABS:  Hematology WBC   Date Value Ref Range Status   03/26/2021 6.31 3.40 - 10.80 10*3/mm3 Final     RBC   Date Value Ref Range Status   03/26/2021 5.19 3.77 - 5.28 10*6/mm3 Final     Hemoglobin   Date Value Ref Range Status   03/26/2021 13.6 12.0 - 15.9 g/dL Final     Hematocrit   Date Value Ref " Range Status   03/26/2021 43.2 34.0 - 46.6 % Final     Platelets   Date Value Ref Range Status   03/26/2021 319 140 - 450 10*3/mm3 Final        SYNOPTIC REPORT (Based on [facility] Cancer Case Summary, version January 2018):                Procedure: Total mastectomy.               Specimen/tumor laterality: Right.                Tumor site: Lower inner quadrant and central/inferior breast.                 Tumor size (greastest dimension of largest invasive focus): 1 cm (lower inner                       quadrant tumor).               Histologic type: Invasive carcinoma of no special type (ductal, not otherwise specified).                 Histologic grade (Rebeca Histologic Score):                              Glandular (acinar)/tubular differentiation: Score 3.                             Nuclear pleomorphism: Score 3.                             Mitotic rate: Score 2.                            Overall grade: Grade 3 (score 8 of 9).                            NOTE: The above Rebeca Histologic Score applies to the largest and                                    highest grade tumor (lower inner quadrant tumor).  The tumor in the                                    central/inferior breast is intermediate grade (tubular score 3, nuclear                                    score 2, mitotic score 1).               Tumor focality: Multiple foci of invasive carcinoma.                             Number of foci: 2.                              Sizes of individual foci: 1 cm (lower inner quadrant tumor) and approximately                                    0.8 cm (central/inferior tumor).               Ductal carcinoma in situ (DCIS): Present, positive for extensive intraductal component.                            Size (extent) of DCIS: Approximately greater than 6 cm (estimated based                                    on gross and microscopic findings).                            Architectural pattern: Solid and  cribriform.                            Nuclear grade: Grade III (high).                            Necrosis: Central comedonecrosis.               Lobular carcinoma in situ (LCIS): No LCIS in specimen.               Margins:                             Invasive carcinoma margins: Uninvolved by invasive carcinoma.                                          Distance from closest margin: 2 mm (deep margin).                             DCIS margins: Positive for DCIS (inferior medial margin [lower inner quadrant]).                                          Additional DCIS margins: DCIS extends to within 2 mm of deep margin                                                 and to within 3 mm of inferior superficial soft tissue margin.  Atypical                                                 ductal hyperplasia is focally present at the deep margin.               Regional lymph nodes: Involved by tumor cells.                             Number of lymph nodes with macrometastases: 0.                            Number of lymph nodes with micrometastases: 0.                            Number of lymph nodes with isolated tumor cells: 1.                            Number of lymph nodes examined: 3.                             Number of sentinel lymph nodes examined: 3.                Treatment effect: No known presurgical therapy.                Lymphovascular invasion: Present.               Dermal lymphovascular invasion: Not identified.                Pathologic stage classification:                             TNM descriptors: m (multiple foci of invasive carcinoma).                             Primary tumor: pT1b.                            Regional lymph nodes: pN0(i+)(sn).                             Distant metastasis: Not applicable.                Additional pathologic findings:                              Ductal hyperplasia of the usual type.                              Fibroinflammatory changes consistent with sites  of prior biopsy (2 prior biopsy                                    sites).                 Ancillary studies:ER MD negative HER-2 positive lower inner quadrant tumor    Echocardiogram  Reading physician: Keily Jensen MD Ordering physician: Cecy Treviño MD Study date: 12/17/18     Study Description     A two-dimensional transthoracic echocardiogram with color flow and Doppler was performed. The study is technically difficult for diagnosis.Verbal consent was obtained from the patient to use Definity contrast in order to optimize the study. The use of Definity was indicated as two or more contiguous segments of the left ventricular endocardial border were unable to be adequately visualized by standard imaging methods. 1.3 mL of mechanically activated Definity was mixed with 8.7 mL of normal saline. A total of 2 mL of the resulting Definity solution was administered, and the remaining contrast was wasted and discarded.   No adverse reaction to contrast was noted.   9/14/18 EF 61% No GLS     DEXA BONE DENSITY AXIAL  FINDINGS: Average lumbar bone mineral density 1.0 g/cm sq correlating to  a T value of -0.2 and a Z score of 1.4. Right femoral neck bone mineral  density 0.77 g/cm sq correlating to a T value of -0.7 and a Z score of  0.5. Left femoral neck bone mineral density 0.79 g/cm sq correlating to  a T value of -0.6 and a Z score of 0.6.     CONCLUSION: Normal bone mineral density.     This report was finalized on 3/18/2019      Echocardiogram  Study Description     A two-dimensional transthoracic echocardiogram with color flow and Doppler was performed.   The study is technically difficult for diagnosis. The quality of the study is limited due to poor acoustic windows related to breast implants. Verbal consent was obtained from the patient to use Definity contrast in order to optimize the study. Note also that contrast was used to enhance ejection fraction reproducability of the LV ejection fraction on serial  echocardiograms.  The use of Definity was indicated as two or more contiguous segments of the left ventricular endocardial border were unable to be adequately visualized by standard imaging methods. 1.3 mL of mechanically activated Definity was mixed with 8.7 mL of normal saline. A total of 2 mL of the resulting Definity solution was administered, and the remaining contrast was wasted and discarded.   No adverse reaction to contrast was noted.   No GLS obtained due to breast implants and spacers  12/17/18  EF=57%    09/14/18  EF=61%.   Electronically signed by Keily Jensen MD on 3/15/19 at 1713 EDT       FINDINGS: Average lumbar bone mineral density 1.0 g/cm sq correlating to  a T value of -0.2 and a Z score of 1.4. Right femoral neck bone mineral  density 0.77 g/cm sq correlating to a T value of -0.7 and a Z score of  0.5. Left femoral neck bone mineral density 0.79 g/cm sq correlating to  a T value of -0.6 and a Z score of 0.6.     CONCLUSION: Normal bone mineral density.     6/7/19  · Estimated EF = 67%.  · Left ventricular systolic function is normal.  · No change from prior study       Interpretation Summary     · Left ventricular systolic function is normal.  · Calculated EF = 68%.  · C/w prior study 3/15/19, there is no change.      Electronically signed by Naomi Mars MD on 8/30/19 at 1555 EDT    DEXA BONE DENSITY AXIAL     CONCLUSION: Normal bone mineral density.     This report was finalized on 3/18/2019 6:11 AM by Dr. Da Maxwell M.D.            Assessment/Plan   1.  Multifocal right breast cancer T1 BN(ic)-isolated tumor cells medial tumor being grade 3 ER/KY negative HER-2 3+ lateral tumor being T1b N0 grade 1 ER/KY positive HER-2 2+ negative fish Oncotype score of 4-post bilateral mastectomy nipple sparing on the left  ·   Positive family history of multiple malignancies genetic testing negative extended panel  · Weekly Taxol Herceptin started 9/28/2018 ×12 planned followed by aromatase  inhibitor for her second tumor  · Arimidex started in 2/19-intolerant switch to Aromasin in 4/19    2.  Wound dehiscence no signs of infection resutured-expander removed in 3/19 with permanent implant placed    3. Blurry vision.  Cataract surgery in 3 weeks vision improved    4. Left hip pain referral to orthopedics planned -degenerative disease of the hip aggravated by Arimidex-joint pain improved with Januvia    Plan  1.  Continue Aromasin 25 mg to continue  as adjuvant therapy for her second tumor -prescription sent to the pharmacy in [place]  2  Bone density due again in March 2021  3.  See me in 6 months for follow-up with a DEXA scan

## 2021-03-26 NOTE — TELEPHONE ENCOUNTER
Per staff msg regarding adding dexa scan to be done in [place] before she comes back - called pt and left msg to call with the name of the facility in [place] this needs to be scheduled at

## 2021-03-29 ENCOUNTER — TELEPHONE (OUTPATIENT)
Dept: ONCOLOGY | Facility: CLINIC | Age: 66
End: 2021-03-29

## 2021-03-29 NOTE — TELEPHONE ENCOUNTER
Called pt with date and time of DEXA- scheduled DEXA 4/9/21 at 4:00 at [facility] ([facility])

## 2021-07-07 NOTE — TELEPHONE ENCOUNTER
Faxed referral order and pt info to [facility] attention [place]. [phone].     Verona Sawant RN    not examined

## 2021-09-10 ENCOUNTER — APPOINTMENT (OUTPATIENT)
Dept: LAB | Facility: HOSPITAL | Age: 66
End: 2021-09-10

## 2021-09-17 ENCOUNTER — TELEPHONE (OUTPATIENT)
Dept: ONCOLOGY | Facility: CLINIC | Age: 66
End: 2021-09-17

## 2021-10-15 ENCOUNTER — OFFICE VISIT (OUTPATIENT)
Dept: ONCOLOGY | Facility: CLINIC | Age: 66
End: 2021-10-15

## 2021-10-15 ENCOUNTER — LAB (OUTPATIENT)
Dept: LAB | Facility: HOSPITAL | Age: 66
End: 2021-10-15

## 2021-10-15 VITALS
BODY MASS INDEX: 23.15 KG/M2 | HEIGHT: 64 IN | OXYGEN SATURATION: 97 % | RESPIRATION RATE: 18 BRPM | HEART RATE: 92 BPM | DIASTOLIC BLOOD PRESSURE: 83 MMHG | WEIGHT: 135.6 LBS | TEMPERATURE: 97.4 F | SYSTOLIC BLOOD PRESSURE: 131 MMHG

## 2021-10-15 DIAGNOSIS — Z17.0 MALIGNANT NEOPLASM OF OVERLAPPING SITES OF RIGHT BREAST IN FEMALE, ESTROGEN RECEPTOR POSITIVE (HCC): ICD-10-CM

## 2021-10-15 DIAGNOSIS — C50.811 MALIGNANT NEOPLASM OF OVERLAPPING SITES OF RIGHT BREAST IN FEMALE, ESTROGEN RECEPTOR POSITIVE (HCC): ICD-10-CM

## 2021-10-15 DIAGNOSIS — Z79.899 ENCOUNTER FOR LONG-TERM (CURRENT) USE OF HIGH-RISK MEDICATION: ICD-10-CM

## 2021-10-15 DIAGNOSIS — C50.811 MALIGNANT NEOPLASM OF OVERLAPPING SITES OF RIGHT BREAST IN FEMALE, ESTROGEN RECEPTOR POSITIVE (HCC): Primary | ICD-10-CM

## 2021-10-15 DIAGNOSIS — Z17.0 MALIGNANT NEOPLASM OF OVERLAPPING SITES OF RIGHT BREAST IN FEMALE, ESTROGEN RECEPTOR POSITIVE (HCC): Primary | ICD-10-CM

## 2021-10-15 LAB
ALBUMIN SERPL-MCNC: 5.1 G/DL (ref 3.5–5.2)
ALBUMIN/GLOB SERPL: 1.8 G/DL (ref 1.1–2.4)
ALP SERPL-CCNC: 94 U/L (ref 38–116)
ALT SERPL W P-5'-P-CCNC: 19 U/L (ref 0–33)
ANION GAP SERPL CALCULATED.3IONS-SCNC: 11.6 MMOL/L (ref 5–15)
AST SERPL-CCNC: 19 U/L (ref 0–32)
BASOPHILS # BLD AUTO: 0.05 10*3/MM3 (ref 0–0.2)
BASOPHILS NFR BLD AUTO: 0.9 % (ref 0–1.5)
BILIRUB SERPL-MCNC: 0.3 MG/DL (ref 0.2–1.2)
BUN SERPL-MCNC: 21 MG/DL (ref 6–20)
BUN/CREAT SERPL: 25.3 (ref 7.3–30)
CALCIUM SPEC-SCNC: 10.4 MG/DL (ref 8.5–10.2)
CHLORIDE SERPL-SCNC: 100 MMOL/L (ref 98–107)
CO2 SERPL-SCNC: 29.4 MMOL/L (ref 22–29)
CREAT SERPL-MCNC: 0.83 MG/DL (ref 0.6–1.1)
DEPRECATED RDW RBC AUTO: 44 FL (ref 37–54)
EOSINOPHIL # BLD AUTO: 0.22 10*3/MM3 (ref 0–0.4)
EOSINOPHIL NFR BLD AUTO: 4.1 % (ref 0.3–6.2)
ERYTHROCYTE [DISTWIDTH] IN BLOOD BY AUTOMATED COUNT: 14.6 % (ref 12.3–15.4)
GFR SERPL CREATININE-BSD FRML MDRD: 69 ML/MIN/1.73
GFR SERPL CREATININE-BSD FRML MDRD: 84 ML/MIN/1.73
GLOBULIN UR ELPH-MCNC: 2.9 GM/DL (ref 1.8–3.5)
GLUCOSE SERPL-MCNC: 103 MG/DL (ref 74–124)
HCT VFR BLD AUTO: 44 % (ref 34–46.6)
HGB BLD-MCNC: 13.7 G/DL (ref 12–15.9)
IMM GRANULOCYTES # BLD AUTO: 0.01 10*3/MM3 (ref 0–0.05)
IMM GRANULOCYTES NFR BLD AUTO: 0.2 % (ref 0–0.5)
LYMPHOCYTES # BLD AUTO: 1.77 10*3/MM3 (ref 0.7–3.1)
LYMPHOCYTES NFR BLD AUTO: 33.1 % (ref 19.6–45.3)
MCH RBC QN AUTO: 26 PG (ref 26.6–33)
MCHC RBC AUTO-ENTMCNC: 31.1 G/DL (ref 31.5–35.7)
MCV RBC AUTO: 83.7 FL (ref 79–97)
MONOCYTES # BLD AUTO: 0.41 10*3/MM3 (ref 0.1–0.9)
MONOCYTES NFR BLD AUTO: 7.7 % (ref 5–12)
NEUTROPHILS NFR BLD AUTO: 2.89 10*3/MM3 (ref 1.7–7)
NEUTROPHILS NFR BLD AUTO: 54 % (ref 42.7–76)
NRBC BLD AUTO-RTO: 0 /100 WBC (ref 0–0.2)
PLATELET # BLD AUTO: 308 10*3/MM3 (ref 140–450)
PMV BLD AUTO: 9.2 FL (ref 6–12)
POTASSIUM SERPL-SCNC: 4 MMOL/L (ref 3.5–4.7)
PROT SERPL-MCNC: 8 G/DL (ref 6.3–8)
RBC # BLD AUTO: 5.26 10*6/MM3 (ref 3.77–5.28)
SODIUM SERPL-SCNC: 141 MMOL/L (ref 134–145)
WBC # BLD AUTO: 5.35 10*3/MM3 (ref 3.4–10.8)

## 2021-10-15 PROCEDURE — 36415 COLL VENOUS BLD VENIPUNCTURE: CPT

## 2021-10-15 PROCEDURE — 80053 COMPREHEN METABOLIC PANEL: CPT

## 2021-10-15 PROCEDURE — 85025 COMPLETE CBC W/AUTO DIFF WBC: CPT

## 2021-10-15 PROCEDURE — 99214 OFFICE O/P EST MOD 30 MIN: CPT | Performed by: INTERNAL MEDICINE

## 2021-10-15 RX ORDER — FEXOFENADINE HCL AND PSEUDOEPHEDRINE HCI 60; 120 MG/1; MG/1
1 TABLET, EXTENDED RELEASE ORAL
COMMUNITY
Start: 2021-06-21 | End: 2022-06-22

## 2021-10-15 RX ORDER — CHLORTHALIDONE 25 MG/1
1 TABLET ORAL NIGHTLY
COMMUNITY
Start: 2021-06-21

## 2021-10-15 RX ORDER — EXEMESTANE 25 MG/1
25 TABLET ORAL DAILY
Qty: 90 TABLET | Refills: 3 | Status: SHIPPED | OUTPATIENT
Start: 2021-10-15 | End: 2022-11-29 | Stop reason: SDUPTHER

## 2021-10-15 NOTE — PROGRESS NOTES
Subjective     REASON FOR CONSULTATION:    1.Multifocal right breast cancer post bilateral mastectomies- T1b N0+-10:00 tumor 8 mm grade 1 ER/OK positive HER-2 negative Oncotype 4  2.  5:00 tumor right breast ER/OK negative HER-2 positive 10 mm in size  3.  Negative genetic testing  4.  Adequate echo weekly Taxol Herceptin started on 9/21/18                          Referring physician Kwaku Gudino M.D.                            History of Present Illness : This is a 62-year-old lady with a multifocal right breast cancer T1b N0+ ER/OK negative HER-2 positive and the second T1b ER/OK positive HER-2 negative tumor here for follow-up after completing maintenance Herceptin.      She was started on Arimidex  for her second tumor which was a low risk hormone positive tumor.  But after 2 months the pain in her hip and shin became unbearable and we switched to Aromasin which she has done very well overall.    She is tolerating the Aromasin well at this point and continuing on it without any problems and swears that taking 100 mg of Januvia completely after gated her joint pains from Aromasin    She saw the orthopedic doctor who thought she needed her left hip replaced and she does not want to do this at the time she tells me Januvia helps significantly with joint pains and she is doing this     She denies headaches, speech disturbances, photophobia, new neuropathy.     Her appetite is good. Her bowels and bladder are working well.     She has had her cataract surgeries and her vision is much improved    She is in  [facility] and she will continue follow-up here for the time being    She missed her DEXA scan is going to schedule this    Past Medical History:   Diagnosis Date   • Environmental allergies    • GERD (gastroesophageal reflux disease)    • History of foreign travel     [place]   • Hypertension    • Malignant neoplasm of overlapping sites of right breast in female, estrogen receptor positive (HCC)  7/18/2018        Past Surgical History:   Procedure Laterality Date   • BREAST BIOPSY Right 2018    Malignant   • BREAST RECONSTRUCTION Bilateral 8/1/2018    Procedure: BILATERAL BREAST TISSUE EXPANDER INSERTION WITH ALLODERM;  Surgeon: Waterhouse, Maurine, MD;  Location: [facility] OR;  Service: Plastics   • BREAST TISSUE EXPANDER REMOVAL INSERTION OF IMPLANT Bilateral 3/22/2019    Procedure: BILATERAL BREAST TISSUE EXPANDER REMOVAL INSERTION OF BILATERAL BREAST IMPLANT;  Surgeon: Waterhouse, Maurine, MD;  Location: [facility] OR OSC;  Service: Plastics   • COLONOSCOPY N/A 3/24/2017    Procedure: COLONOSCOPY TO CECUM AND TI;  Surgeon: Aleksander Bella MD;  Location: [facility] ENDOSCOPY;  Service:    • ENDOSCOPY N/A 3/24/2017    Procedure: ESOPHAGOGASTRODUODENOSCOPY;  Surgeon: Aleksander Bella MD;  Location: [facility] ENDOSCOPY;  Service:    • EYE SURGERY Right 05/01/2019    lens transplant   • HYSTERECTOMY  1989   • MASTECTOMY W/ SENTINEL NODE BIOPSY Bilateral 8/1/2018    Procedure: BILATERAL TOTAL MASTECTOMY WITH RIGHT SENTINEL LYMPH MARTINEZ BIOPSY;  Surgeon: Kwaku Gudino MD;  Location: [facility] OR;  Service: General   • OOPHORECTOMY Bilateral 1989   • VENOUS ACCESS DEVICE (PORT) INSERTION Left 9/14/2018    Procedure: INSERTION VENOUS ACCESS DEVICE;  Surgeon: Kwauk Gudino MD;  Location: [facility] OR;  Service: General      ONCOLOGIC HISTORY;  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  The patient had never had a previous mammogram and was referred for evaluation at which time she was found to have a 4.2 cm mass in the upper outer quadrant of the right breast at the 9:30 position associated with pleomorphic calcifications in the lower inner quadrant also felt to be suspicious left breast was benign.  Patient underwent ultrasound guided biopsy on 6/15/18 with the biopsy at the 9:30 position with clip  placement showing invasive mammary carcinoma ductal type low-grade ER % WV 9800% HER-2 2+ negative by fish.  Patient also underwent biopsy at the 4:00 position which showed a microscopic focus of invasive ductal carcinoma felt to be probably a lymphatic space involvement and was too small to do additional testing and a clip was placed.  Patient went on to have bilateral breast MRIs and was referred to Dr. Gudino MRIs confirmed 2 areas of abnormality at the breast-lower inner quadrant showed a large hematoma with a clip identified and there is extensive clumped abnormal enhancement beginning superior to the biopsy cavity and extending laterally to the entire upper half of the right breast into the upper outer quadrant measuring 1.2 x 1.4 cm and extending over 7 cm.  A biopsy clip was identified in this area the abnormal enhancement focally abutted the pectoralis muscle medially with no invasion through few borderline enlarged right axillary nodes with normal morphology no internal mammary adenopathy was noted left breast was benign.  Patient went on to have bilateral mastectomies the final path report showed 2 separate primaries in the breast associated with extensive high-grade DCIS measuring 6 cm  The lower inner quadrant lesion measured 1 cm was a grade 3 invasive ductal carcinoma ER 0 WV 0 HER-2 3+ and the lesion at the 9:30 position was a grade 1 invasive ductal carcinoma measuring 8 mm strongly ER/WV positive and HER-2 negative based on preop testing.  3 sentinel nodes were obtained 1 showed isolated tumor cells.  Margins showed  Positivity with DCIS in the inferior medial margin and was close to the deep margin (2mm) and inferior superficial margin(3mm).  Lymphovascular invasion was present.  Left breast is benign    Postoperatively she's done well and had her expanders filled and is here today to discuss adjuvant treatment.  Unfortunately she did not know about the testing on the second tumor at  surgery and thought she had a grade 1 ER/WI positive HER-2 negative tumor and was very surprised when I told her the prior markers on the second tumor.    She is  0 para 0 menarche was at age 13 menopause at age 33 when she had a hysterectomy and oophorectomy.  She's been on hormone replacement since age of 33 until her recent diagnosis .    Family history is positive for mother having colon cancer age 72 maternal aunt with breast cancer in her 60s and another maternal aunt with uterine cancer in her 70s.  She's had negative genetic testing but the extended panel is being done and is pending    We discussed treatment plans and I have scheduled her for Chemotherapy education with tentative plans for weekly Taxol Herceptin for 12 weeks followed by Herceptin for a year  We will do an Oncotype DX score on the hormone positive tumor which is low-grade unlikely to be high risk    We discussed whether or not radiation would be indicated and except for the fact that there is a positive margin with high-grade DCIS normally she would not need radiation but I recommended a consultation because of the margins and the high grade nature of the DCIS.    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  She is presented at the multidiscipline conference and everybody felt comfortable with  weekly Taxol Herceptin for 12 weeks followed by Herceptin for a year and Oncotype testing on the second tumor for which she would receive an aromatase inhibitor for 5 years unless it was high risk which is very unlikely    She is here today having had an echo which showed an adequate ejection fraction  Of 61% and she had a port placed and is here to start treatment.    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    Unfortunately the wrong specimen was sent for Oncotype testing and ER negative specimen was sent and I talked to the pathologist and they will send the correct specimen for Oncotype DX testing with no talked to the patient.    That he has no questions and is happy to finally get started with therapy.    Genetic testing results are negative using the INVITAE  Panel    1/19  She is already having pain in her left knee and wants to see an orthopedic surgeon for this pain is pretty sharp and she is taking Celebrex to help deal with this    We discussed the side effects of Arimidex including hot flashes and joint pains but she is wanting to do this rather than tamoxifen because she does not like the side effect profile of tamoxifen    She's never had a bone density and we will order this also and make sure it is adequate    She has no significant neuropathy and she is happy to be off the Taxol-she tolerated the Herceptin alone well but wants to cut back on the Benadryl because it makes her too sleepy    She lost a lot of hair in the chronic for him because her [place] cooling Did not fit well on the vertex of her skull    She reports mild blurry vision that started approximately after the 10th dose of Taxol and her ophthalmologist as she has cataracts that are  affecting the macular part of her iron she needs to have surgeries sometime in the next month.  I told her that Herceptin does not cause this and all the  steroid premeds may have accelerated to cataract          Current Outpatient Medications on File Prior to Visit   Medication Sig Dispense Refill   • chlorthalidone (HYGROTON) 25 MG tablet Take 1 tablet by mouth Every Night.     • fexofenadine-pseudoephedrine (ALLEGRA-D)  MG per 12 hr tablet Take 1 tablet by mouth.     • omeprazole-sodium bicarbonate (ZEGERID)  MG per capsule Take 1 capsule by mouth Every Night.     • potassium chloride (MICRO-K) 10 MEQ CR capsule Take 2 capsules by mouth Daily. 180 capsule 3   • Turmeric 500 MG tablet Take 1,000 mg by mouth Daily.     • [DISCONTINUED] chlorthalidone (HYGROTON) 25 MG tablet Take 1 tablet by mouth Every Night. 30 tablet 6   • [DISCONTINUED] exemestane (AROMASIN) 25 MG chemo tablet Take 1 tablet by mouth Daily. 90 tablet 3   • [DISCONTINUED] fexofenadine-pseudoephedrine (ALLEGRA-D)  MG per 12 hr tablet Take 1 tablet by mouth.       No current facility-administered medications on file prior to visit.        ALLERGIES:    Allergies   Allergen Reactions   • Moxifloxacin Anaphylaxis and Rash   • Aloxi [Palonosetron Hcl] Itching   • Latex Rash     Added based on information entered during log entry, please review and add reactions, type, and severity as needed        Social History     Socioeconomic History   • Marital status:      Spouse name: FLORINA Gross   • Number of children: 0   • Years of education: Graduate School   Tobacco Use   • Smoking status: Never Smoker   • Smokeless tobacco: Never Used   Substance and Sexual Activity   • Alcohol use: Yes     Types: 1 Glasses of wine per week   • Drug use: No   • Sexual activity: Defer     Comment:         Family History   Problem Relation Age of Onset   • Colon cancer Mother 72   • Breast cancer Maternal Aunt 60   • Uterine cancer Maternal  "Aunt 75   • Stroke Father    • Malig Hyperthermia Neg Hx         Review of Systems   Constitutional: Positive for diaphoresis.   Gastrointestinal:        Chronic indigestion   Endocrine: Positive for heat intolerance.   Neurological: 1-st1stgstrstastdstest numbness.    Left hip pain 2/10 taking Januvia    Objective     Vitals:    10/15/21 1153   BP: 131/83   Pulse: 92   Resp: 18   Temp: 97.4 °F (36.3 °C)   TempSrc: Temporal   SpO2: 97%   Weight: 61.5 kg (135 lb 9.6 oz)   Height: 162.6 cm (64.02\")   PainSc: 0-No pain     Current Status 10/15/2021   ECOG score 0       Physical Exam    GENERAL:  Well-developed, well-nourished in no acute distress.   SKIN:  Warm, dry without rashes, purpura or petechiae.  EYES:  Pupils equal, round and reactive to light.  EOMs intact.  Conjunctivae normal. Mild opacity of the right eye.   EARS:  Hearing intact.  NOSE:  Septum midline.  No excoriations or nasal discharge.  MOUTH:  Tongue is well-papillated; no stomatitis or ulcers.  Lips normal.  THROAT:  Oropharynx without lesions or exudates.  NECK:  Supple with good range of motion; no thyromegaly or masses, no JVD.  LYMPHATICS:  No cervical, supraclavicular, axillary or inguinal adenopathy.  CHEST:  Lungs clear to auscultation. Good airflow.  BREATS: Left breast is benign with an implant nipple intact the right breast has an implant in place   CARDIAC:  Regular rate and rhythm without murmurs, rubs or gallops. Normal S1,S2.  ABDOMEN:  Soft, nontender with no hepatosplenomegaly or masses.  EXTREMITIES:  No clubbing, cyanosis or edema.  No obvious fluid or warmth in the left knee  NEUROLOGICAL:  Cranial Nerves II-XII grossly intact.  No focal neurological deficits.  PSYCHIATRIC:  Normal affect and mood.    I have reexamined the patient and the results are consistent with the previously documented exam. Martin Treviño MD       RECENT LABS:  Hematology WBC   Date Value Ref Range Status   10/15/2021 5.35 3.40 - 10.80 10*3/mm3 Final     RBC   Date " Value Ref Range Status   10/15/2021 5.26 3.77 - 5.28 10*6/mm3 Final     Hemoglobin   Date Value Ref Range Status   10/15/2021 13.7 12.0 - 15.9 g/dL Final     Hematocrit   Date Value Ref Range Status   10/15/2021 44.0 34.0 - 46.6 % Final     Platelets   Date Value Ref Range Status   10/15/2021 308 140 - 450 10*3/mm3 Final        SYNOPTIC REPORT (Based on [facility] Cancer Case Summary, version January 2018):                Procedure: Total mastectomy.               Specimen/tumor laterality: Right.                Tumor site: Lower inner quadrant and central/inferior breast.                 Tumor size (greastest dimension of largest invasive focus): 1 cm (lower inner                       quadrant tumor).               Histologic type: Invasive carcinoma of no special type (ductal, not otherwise specified).                 Histologic grade (Rebeca Histologic Score):                              Glandular (acinar)/tubular differentiation: Score 3.                             Nuclear pleomorphism: Score 3.                             Mitotic rate: Score 2.                            Overall grade: Grade 3 (score 8 of 9).                            NOTE: The above Rebeca Histologic Score applies to the largest and                                    highest grade tumor (lower inner quadrant tumor).  The tumor in the                                    central/inferior breast is intermediate grade (tubular score 3, nuclear                                    score 2, mitotic score 1).               Tumor focality: Multiple foci of invasive carcinoma.                             Number of foci: 2.                              Sizes of individual foci: 1 cm (lower inner quadrant tumor) and approximately                                    0.8 cm (central/inferior tumor).               Ductal carcinoma in situ (DCIS): Present, positive for extensive intraductal component.                            Size (extent) of DCIS:  Approximately greater than 6 cm (estimated based                                    on gross and microscopic findings).                            Architectural pattern: Solid and cribriform.                            Nuclear grade: Grade III (high).                            Necrosis: Central comedonecrosis.               Lobular carcinoma in situ (LCIS): No LCIS in specimen.               Margins:                             Invasive carcinoma margins: Uninvolved by invasive carcinoma.                                          Distance from closest margin: 2 mm (deep margin).                             DCIS margins: Positive for DCIS (inferior medial margin [lower inner quadrant]).                                          Additional DCIS margins: DCIS extends to within 2 mm of deep margin                                                 and to within 3 mm of inferior superficial soft tissue margin.  Atypical                                                 ductal hyperplasia is focally present at the deep margin.               Regional lymph nodes: Involved by tumor cells.                             Number of lymph nodes with macrometastases: 0.                            Number of lymph nodes with micrometastases: 0.                            Number of lymph nodes with isolated tumor cells: 1.                            Number of lymph nodes examined: 3.                             Number of sentinel lymph nodes examined: 3.                Treatment effect: No known presurgical therapy.                Lymphovascular invasion: Present.               Dermal lymphovascular invasion: Not identified.                Pathologic stage classification:                             TNM descriptors: m (multiple foci of invasive carcinoma).                             Primary tumor: pT1b.                            Regional lymph nodes: pN0(i+)(sn).                             Distant metastasis: Not applicable.                 Additional pathologic findings:                              Ductal hyperplasia of the usual type.                              Fibroinflammatory changes consistent with sites of prior biopsy (2 prior biopsy                                    sites).                 Ancillary studies:ER AR negative HER-2 positive lower inner quadrant tumor    Echocardiogram  Reading physician: Keily Jensen MD Ordering physician: Cecy Treviño MD Study date: 12/17/18     Study Description     A two-dimensional transthoracic echocardiogram with color flow and Doppler was performed. The study is technically difficult for diagnosis.Verbal consent was obtained from the patient to use Definity contrast in order to optimize the study. The use of Definity was indicated as two or more contiguous segments of the left ventricular endocardial border were unable to be adequately visualized by standard imaging methods. 1.3 mL of mechanically activated Definity was mixed with 8.7 mL of normal saline. A total of 2 mL of the resulting Definity solution was administered, and the remaining contrast was wasted and discarded.   No adverse reaction to contrast was noted.   9/14/18 EF 61% No GLS     DEXA BONE DENSITY AXIAL  FINDINGS: Average lumbar bone mineral density 1.0 g/cm sq correlating to  a T value of -0.2 and a Z score of 1.4. Right femoral neck bone mineral  density 0.77 g/cm sq correlating to a T value of -0.7 and a Z score of  0.5. Left femoral neck bone mineral density 0.79 g/cm sq correlating to  a T value of -0.6 and a Z score of 0.6.     CONCLUSION: Normal bone mineral density.     This report was finalized on 3/18/2019      Echocardiogram  Study Description     A two-dimensional transthoracic echocardiogram with color flow and Doppler was performed.   The study is technically difficult for diagnosis. The quality of the study is limited due to poor acoustic windows related to breast implants. Verbal consent was obtained from the  patient to use Definity contrast in order to optimize the study. Note also that contrast was used to enhance ejection fraction reproducability of the LV ejection fraction on serial echocardiograms.  The use of Definity was indicated as two or more contiguous segments of the left ventricular endocardial border were unable to be adequately visualized by standard imaging methods. 1.3 mL of mechanically activated Definity was mixed with 8.7 mL of normal saline. A total of 2 mL of the resulting Definity solution was administered, and the remaining contrast was wasted and discarded.   No adverse reaction to contrast was noted.   No GLS obtained due to breast implants and spacers  12/17/18  EF=57%    09/14/18  EF=61%.   Electronically signed by Keily Jensen MD on 3/15/19 at 1713 EDT       FINDINGS: Average lumbar bone mineral density 1.0 g/cm sq correlating to  a T value of -0.2 and a Z score of 1.4. Right femoral neck bone mineral  density 0.77 g/cm sq correlating to a T value of -0.7 and a Z score of  0.5. Left femoral neck bone mineral density 0.79 g/cm sq correlating to  a T value of -0.6 and a Z score of 0.6.     CONCLUSION: Normal bone mineral density.     6/7/19  · Estimated EF = 67%.  · Left ventricular systolic function is normal.  · No change from prior study       Interpretation Summary     · Left ventricular systolic function is normal.  · Calculated EF = 68%.  · C/w prior study 3/15/19, there is no change.      Electronically signed by Naomi Mars MD on 8/30/19 at 1555 EDT    DEXA BONE DENSITY AXIAL     CONCLUSION: Normal bone mineral density.     This report was finalized on 3/18/2019 6:11 AM by Dr. Da Maxwell M.D.            Assessment/Plan   1.  Multifocal right breast cancer T1 BN(ic)-isolated tumor cells medial tumor being grade 3 ER/KS negative HER-2 3+ lateral tumor being T1b N0 grade 1 ER/KS positive HER-2 2+ negative fish Oncotype score of 4-post bilateral mastectomy nipple sparing on  the left  ·   Positive family history of multiple malignancies genetic testing negative extended panel  · Weekly Taxol Herceptin started 9/28/2018 ×12 planned followed by aromatase inhibitor for her second tumor  · Arimidex started in 2/19-intolerant switch to Aromasin in 4/19  · Continue Aromasin as of 10/21 with good tolerance/    2.  Wound dehiscence no signs of infection resutured-expander removed in 3/19 with permanent implant placed    3. Blurry vision.  Cataract surgery in 3 weeks vision improved    4. Left hip pain referral to orthopedics planned -degenerative disease of the hip aggravated by Arimidex-joint pain improved with Januvia    Plan  1.  Continue Aromasin 25 mg to continue  as adjuvant therapy for her second tumor -prescription sent to the pharmacy in [place]  2  Bone density due and will be rescheduled  3.  See me in 6 months for follow-up with a DEXA scan

## 2022-04-15 ENCOUNTER — LAB (OUTPATIENT)
Dept: LAB | Facility: HOSPITAL | Age: 67
End: 2022-04-15

## 2022-04-15 ENCOUNTER — OFFICE VISIT (OUTPATIENT)
Dept: ONCOLOGY | Facility: CLINIC | Age: 67
End: 2022-04-15

## 2022-04-15 VITALS
RESPIRATION RATE: 16 BRPM | OXYGEN SATURATION: 100 % | WEIGHT: 136.3 LBS | SYSTOLIC BLOOD PRESSURE: 145 MMHG | DIASTOLIC BLOOD PRESSURE: 79 MMHG | HEIGHT: 64 IN | TEMPERATURE: 96.8 F | HEART RATE: 98 BPM | BODY MASS INDEX: 23.27 KG/M2

## 2022-04-15 DIAGNOSIS — C50.811 MALIGNANT NEOPLASM OF OVERLAPPING SITES OF RIGHT BREAST IN FEMALE, ESTROGEN RECEPTOR POSITIVE: ICD-10-CM

## 2022-04-15 DIAGNOSIS — Z79.899 ENCOUNTER FOR LONG-TERM (CURRENT) USE OF HIGH-RISK MEDICATION: ICD-10-CM

## 2022-04-15 DIAGNOSIS — Z17.0 MALIGNANT NEOPLASM OF OVERLAPPING SITES OF RIGHT BREAST IN FEMALE, ESTROGEN RECEPTOR POSITIVE: Primary | ICD-10-CM

## 2022-04-15 DIAGNOSIS — C50.811 MALIGNANT NEOPLASM OF OVERLAPPING SITES OF RIGHT BREAST IN FEMALE, ESTROGEN RECEPTOR POSITIVE: Primary | ICD-10-CM

## 2022-04-15 DIAGNOSIS — Z17.0 MALIGNANT NEOPLASM OF OVERLAPPING SITES OF RIGHT BREAST IN FEMALE, ESTROGEN RECEPTOR POSITIVE: ICD-10-CM

## 2022-04-15 PROBLEM — Z79.811 AROMATASE INHIBITOR USE: Status: ACTIVE | Noted: 2022-04-15

## 2022-04-15 LAB
ALBUMIN SERPL-MCNC: 4.8 G/DL (ref 3.5–5.2)
ALBUMIN/GLOB SERPL: 1.7 G/DL (ref 1.1–2.4)
ALP SERPL-CCNC: 99 U/L (ref 38–116)
ALT SERPL W P-5'-P-CCNC: 17 U/L (ref 0–33)
ANION GAP SERPL CALCULATED.3IONS-SCNC: 12 MMOL/L (ref 5–15)
AST SERPL-CCNC: 20 U/L (ref 0–32)
BASOPHILS # BLD AUTO: 0.06 10*3/MM3 (ref 0–0.2)
BASOPHILS NFR BLD AUTO: 0.8 % (ref 0–1.5)
BILIRUB SERPL-MCNC: 0.3 MG/DL (ref 0.2–1.2)
BUN SERPL-MCNC: 15 MG/DL (ref 6–20)
BUN/CREAT SERPL: 13.9 (ref 7.3–30)
CALCIUM SPEC-SCNC: 10.1 MG/DL (ref 8.5–10.2)
CHLORIDE SERPL-SCNC: 102 MMOL/L (ref 98–107)
CO2 SERPL-SCNC: 30 MMOL/L (ref 22–29)
CREAT SERPL-MCNC: 1.08 MG/DL (ref 0.6–1.1)
DEPRECATED RDW RBC AUTO: 43.2 FL (ref 37–54)
EGFRCR SERPLBLD CKD-EPI 2021: 57.1 ML/MIN/1.73
EOSINOPHIL # BLD AUTO: 0.18 10*3/MM3 (ref 0–0.4)
EOSINOPHIL NFR BLD AUTO: 2.4 % (ref 0.3–6.2)
ERYTHROCYTE [DISTWIDTH] IN BLOOD BY AUTOMATED COUNT: 14.6 % (ref 12.3–15.4)
GLOBULIN UR ELPH-MCNC: 2.8 GM/DL (ref 1.8–3.5)
GLUCOSE SERPL-MCNC: 97 MG/DL (ref 74–124)
HCT VFR BLD AUTO: 41.1 % (ref 34–46.6)
HGB BLD-MCNC: 12.9 G/DL (ref 12–15.9)
IMM GRANULOCYTES # BLD AUTO: 0.02 10*3/MM3 (ref 0–0.05)
IMM GRANULOCYTES NFR BLD AUTO: 0.3 % (ref 0–0.5)
LYMPHOCYTES # BLD AUTO: 2.22 10*3/MM3 (ref 0.7–3.1)
LYMPHOCYTES NFR BLD AUTO: 30.1 % (ref 19.6–45.3)
MCH RBC QN AUTO: 26.1 PG (ref 26.6–33)
MCHC RBC AUTO-ENTMCNC: 31.4 G/DL (ref 31.5–35.7)
MCV RBC AUTO: 83 FL (ref 79–97)
MONOCYTES # BLD AUTO: 0.57 10*3/MM3 (ref 0.1–0.9)
MONOCYTES NFR BLD AUTO: 7.7 % (ref 5–12)
NEUTROPHILS NFR BLD AUTO: 4.33 10*3/MM3 (ref 1.7–7)
NEUTROPHILS NFR BLD AUTO: 58.7 % (ref 42.7–76)
NRBC BLD AUTO-RTO: 0 /100 WBC (ref 0–0.2)
PLATELET # BLD AUTO: 303 10*3/MM3 (ref 140–450)
PMV BLD AUTO: 9.2 FL (ref 6–12)
POTASSIUM SERPL-SCNC: 3.8 MMOL/L (ref 3.5–4.7)
PROT SERPL-MCNC: 7.6 G/DL (ref 6.3–8)
RBC # BLD AUTO: 4.95 10*6/MM3 (ref 3.77–5.28)
SODIUM SERPL-SCNC: 144 MMOL/L (ref 134–145)
WBC NRBC COR # BLD: 7.38 10*3/MM3 (ref 3.4–10.8)

## 2022-04-15 PROCEDURE — 85025 COMPLETE CBC W/AUTO DIFF WBC: CPT

## 2022-04-15 PROCEDURE — 80053 COMPREHEN METABOLIC PANEL: CPT

## 2022-04-15 PROCEDURE — 36415 COLL VENOUS BLD VENIPUNCTURE: CPT

## 2022-04-15 PROCEDURE — 99214 OFFICE O/P EST MOD 30 MIN: CPT | Performed by: INTERNAL MEDICINE

## 2022-04-15 NOTE — PROGRESS NOTES
Subjective     REASON FOR CONSULTATION:    1.Multifocal right breast cancer post bilateral mastectomies- T1b N0+-10:00 tumor 8 mm grade 1 ER/TX positive HER-2 negative Oncotype 4  2.  5:00 tumor right breast ER/TX negative HER-2 positive 10 mm in size  3.  Negative genetic testing  4.  Adequate echo weekly Taxol Herceptin started on 9/21/18                          Referring physician Kwaku Gudino M.D.                            History of Present Illness : This is a 62-year-old lady with a multifocal right breast cancer T1b N0+ ER/TX negative HER-2 positive and the second T1b ER/TX positive HER-2 negative tumor here for follow-up     She was started on Arimidex  for her second tumor which was a low risk hormone positive tumor.  But after 2 months the pain in her hip and shin became unbearable and we switched to Aromasin which she has done very well overall.  She has been on hormonal blockade since January 2019 little over 3 years    She is tolerating the Aromasin well at this point and continuing on it without any problems and swears that taking 100 mg of Januvia completely abrogated her joint pains from Aromasin    She saw the orthopedic doctor who thought she needed her left hip replaced and she does not want to do this at the time she tells me Januvia helps significantly with joint pains and she is doing this     She denies headaches, speech disturbances, photophobia, new neuropathy.     Her appetite is good. Her bowels and bladder are working well.     She has had her cataract surgeries and her vision is much improved    She is in  [facility] and she will continue follow-up here for the time being    She missed her DEXA scan is going to reschedule this    Past Medical History:   Diagnosis Date   • Environmental allergies    • GERD (gastroesophageal reflux disease)    • History of foreign travel     [place]   • Hypertension    • Malignant neoplasm of overlapping sites of right breast in female,  estrogen receptor positive (HCC) 7/18/2018        Past Surgical History:   Procedure Laterality Date   • BREAST BIOPSY Right 2018    Malignant   • BREAST RECONSTRUCTION Bilateral 8/1/2018    Procedure: BILATERAL BREAST TISSUE EXPANDER INSERTION WITH ALLODERM;  Surgeon: Waterhouse, Maurine, MD;  Location: [facility] MAIN OR;  Service: Plastics   • BREAST TISSUE EXPANDER REMOVAL INSERTION OF IMPLANT Bilateral 3/22/2019    Procedure: BILATERAL BREAST TISSUE EXPANDER REMOVAL INSERTION OF BILATERAL BREAST IMPLANT;  Surgeon: Waterhouse, Maurine, MD;  Location: [facility] OR OSC;  Service: Plastics   • COLONOSCOPY N/A 3/24/2017    Procedure: COLONOSCOPY TO CECUM AND TI;  Surgeon: Aleksander Bella MD;  Location: [facility] ENDOSCOPY;  Service:    • ENDOSCOPY N/A 3/24/2017    Procedure: ESOPHAGOGASTRODUODENOSCOPY;  Surgeon: Aleksander Bella MD;  Location: 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  The patient had never had a previous mammogram and was referred for evaluation at which time she was found to have a 4.2 cm mass in the upper outer quadrant of the right breast at the 9:30 position associated with pleomorphic calcifications in the lower inner quadrant also felt to be suspicious left breast was benign.  Patient underwent ultrasound guided biopsy on 6/15/18 with the  biopsy at the 9:30 position with clip placement showing invasive mammary carcinoma ductal type low-grade ER % MS 9800% HER-2 2+ negative by fish.  Patient also underwent biopsy at the 4:00 position which showed a microscopic focus of invasive ductal carcinoma felt to be probably a lymphatic space involvement and was too small to do additional testing and a clip was placed.  Patient went on to have bilateral breast MRIs and was referred to Dr. Gudino MRIs confirmed 2 areas of abnormality at the breast-lower inner quadrant showed a large hematoma with a clip identified and there is extensive clumped abnormal enhancement beginning superior to the biopsy cavity and extending laterally to the entire upper half of the right breast into the upper outer quadrant measuring 1.2 x 1.4 cm and extending over 7 cm.  A biopsy clip was identified in this area the abnormal enhancement focally abutted the pectoralis muscle medially with no invasion through few borderline enlarged right axillary nodes with normal morphology no internal mammary adenopathy was noted left breast was benign.  Patient went on to have bilateral mastectomies the final path report showed 2 separate primaries in the breast associated with extensive high-grade DCIS measuring 6 cm  The lower inner quadrant lesion measured 1 cm was a grade 3 invasive ductal carcinoma ER 0 MS 0 HER-2 3+ and the lesion at the 9:30 position was a grade 1 invasive ductal carcinoma measuring 8 mm strongly ER/MS positive and HER-2 negative based on preop testing.  3 sentinel nodes were obtained 1 showed isolated tumor cells.  Margins showed  Positivity with DCIS in the inferior medial margin and was close to the deep margin (2mm) and inferior superficial margin(3mm).  Lymphovascular invasion was present.  Left breast is benign    Postoperatively she's done well and had her expanders filled and is here today to discuss adjuvant treatment.  Unfortunately she did not know about  the testing on the second tumor at surgery and thought she had a grade 1 ER/HI positive HER-2 negative tumor and was very surprised when I told her the prior markers on the second tumor.    She is  0 para 0 menarche was at age 13 menopause at age 33 when she had a hysterectomy and oophorectomy.  She's been on hormone replacement since age of 33 until her recent diagnosis .    Family history is positive for mother having colon cancer age 72 maternal aunt with breast cancer in her 60s and another maternal aunt with uterine cancer in her 70s.  She's had negative genetic testing but the extended panel is being done and is pending    We discussed treatment plans and I have scheduled her for Chemotherapy education with tentative plans for weekly Taxol Herceptin for 12 weeks followed by Herceptin for a year  We will do an Oncotype DX score on the hormone positive tumor which is low-grade unlikely to be high risk    We discussed whether or not radiation would be indicated and except for the fact that there is a positive margin with high-grade DCIS normally she would not need radiation but I recommended a consultation because of the margins and the high grade nature of the DCIS.    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  She is presented at the multidiscipline conference and  everybody felt comfortable with weekly Taxol Herceptin for 12 weeks followed by Herceptin for a year and Oncotype testing on the second tumor for which she would receive an aromatase inhibitor for 5 years unless it was high risk which is very unlikely    She is here today having had an echo which showed an adequate ejection fraction  Of 61% and she had a port placed and is here to start treatment.    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    Unfortunately the wrong specimen was sent for Oncotype testing and ER negative specimen was sent and I talked to the pathologist and they will send the correct specimen for Oncotype DX testing with no talked to the patient.    That he has no questions and is happy to finally get started with therapy.    Genetic testing results are negative using the INVITAE  Panel    1/19  She is already having pain in her left knee and wants to see an orthopedic surgeon for this pain is pretty sharp and she is taking Celebrex to help deal with this    We discussed the side effects of Arimidex including hot flashes and joint pains but she is wanting to do this rather than tamoxifen because she does not like the side effect profile of tamoxifen    She's never had a bone density and we will order this also and make sure it is adequate    She has no significant neuropathy and she is happy to be off the Taxol-she tolerated the Herceptin alone well but wants to cut back on the Benadryl because it makes her too sleepy    She lost a lot of hair in the chronic for him because her [place] cooling Did not fit well on the vertex of her skull    She reports mild blurry vision that started approximately after the 10th dose of Taxol and her ophthalmologist as she  has cataracts that are affecting the macular part of her iron she needs to have surgeries sometime in the next month.  I told her that Herceptin does not cause this and all the  steroid premeds may have accelerated to cataract          Current Outpatient Medications on File Prior to Visit   Medication Sig Dispense Refill   • chlorthalidone (HYGROTON) 25 MG tablet Take 1 tablet by mouth Every Night.     • exemestane (AROMASIN) 25 MG chemo tablet Take 1 tablet by mouth Daily. 90 tablet 3   • fexofenadine-pseudoephedrine (ALLEGRA-D)  MG per 12 hr tablet Take 1 tablet by mouth.     • omeprazole-sodium bicarbonate (ZEGERID)  MG per capsule Take 1 capsule by mouth Every Night.     • potassium chloride (MICRO-K) 10 MEQ CR capsule Take 2 capsules by mouth Daily. 180 capsule 3   • Turmeric 500 MG tablet Take 1,000 mg by mouth Daily.       No current facility-administered medications on file prior to visit.        ALLERGIES:    Allergies   Allergen Reactions   • Moxifloxacin Anaphylaxis and Rash   • Aloxi [Palonosetron Hcl] Itching   • Latex Rash     Added based on information entered during log entry, please review and add reactions, type, and severity as needed        Social History     Socioeconomic History   • Marital status:      Spouse name: FLORINA Gross   • Number of children: 0   • Years of education: Graduate School   Tobacco Use   • Smoking status: Never Smoker   • Smokeless tobacco: Never Used   Substance and Sexual Activity   • Alcohol use: Yes     Types: 1 Glasses of wine per week   • Drug use: No   • Sexual activity: Defer     Comment:         Family History   Problem Relation Age of Onset   • Colon cancer Mother 72   • Breast cancer Maternal Aunt 60   • Uterine cancer Maternal Aunt 75   • Stroke Father    • Malig Hyperthermia Neg Hx      Review of Systems   Constitutional: Negative for appetite change, chills, diaphoresis, fatigue, fever and unexpected weight change.   HENT: Negative for  "hearing loss, sore throat and trouble swallowing.    Respiratory: Negative for cough, chest tightness, shortness of breath and wheezing.    Cardiovascular: Negative for chest pain, palpitations and leg swelling.   Gastrointestinal: Negative for abdominal distention, abdominal pain, constipation, diarrhea, nausea and vomiting.   Genitourinary: Negative for dysuria, frequency, hematuria and urgency.   Musculoskeletal: Negative for joint swelling.        No muscle weakness.   Skin: Negative for rash and wound.   Neurological: Negative for seizures, syncope, speech difficulty, weakness, numbness and headaches.   Hematological: Negative for adenopathy. Does not bruise/bleed easily.   Psychiatric/Behavioral: Negative for behavioral problems, confusion and suicidal ideas.   All other systems reviewed and are negative.         Review of Systems   Constitutional: Positive for diaphoresis.   Gastrointestinal:        Chronic indigestion   Endocrine: Positive for heat intolerance.   Neurological: 1-st1stgstrstastdstest numbness.    Left hip pain 2/10 taking Januvia    Objective     Vitals:    04/15/22 1322   BP: 145/79   Pulse: 98   Resp: 16   Temp: 96.8 °F (36 °C)   TempSrc: Temporal   SpO2: 100%   Weight: 61.8 kg (136 lb 4.8 oz)   Height: 162.6 cm (64.02\")   PainSc: 0-No pain     Current Status 4/15/2022   ECOG score 0       Physical Exam    GENERAL:  Well-developed, well-nourished in no acute distress.   SKIN:  Warm, dry without rashes, purpura or petechiae.  EYES:  Pupils equal, round and reactive to light.  EOMs intact.  Conjunctivae normal. Mild opacity of the right eye.   EARS:  Hearing intact.  NOSE:  Septum midline.  No excoriations or nasal discharge.  MOUTH:  Tongue is well-papillated; no stomatitis or ulcers.  Lips normal.  THROAT:  Oropharynx without lesions or exudates.  NECK:  Supple with good range of motion; no thyromegaly or masses, no JVD.  LYMPHATICS:  No cervical, supraclavicular, axillary or inguinal adenopathy.  CHEST: "  Lungs clear to auscultation. Good airflow.  BREATS: Left breast is benign with an implant nipple intact the right breast has an implant in place   CARDIAC:  Regular rate and rhythm without murmurs, rubs or gallops. Normal S1,S2.  ABDOMEN:  Soft, nontender with no hepatosplenomegaly or masses.  EXTREMITIES:  No clubbing, cyanosis or edema.  No obvious fluid or warmth in the left knee  NEUROLOGICAL:  Cranial Nerves II-XII grossly intact.  No focal neurological deficits.  PSYCHIATRIC:  Normal affect and mood.    I have reexamined the patient and the results are consistent with the previously documented exam. Martin Treviño MD       RECENT LABS:  Hematology WBC   Date Value Ref Range Status   04/15/2022 7.38 3.40 - 10.80 10*3/mm3 Final     RBC   Date Value Ref Range Status   04/15/2022 4.95 3.77 - 5.28 10*6/mm3 Final     Hemoglobin   Date Value Ref Range Status   04/15/2022 12.9 12.0 - 15.9 g/dL Final     Hematocrit   Date Value Ref Range Status   04/15/2022 41.1 34.0 - 46.6 % Final     Platelets   Date Value Ref Range Status   04/15/2022 303 140 - 450 10*3/mm3 Final        SYNOPTIC REPORT (Based on [facility] Cancer Case Summary, version January 2018):                Procedure: Total mastectomy.               Specimen/tumor laterality: Right.                Tumor site: Lower inner quadrant and central/inferior breast.                 Tumor size (greastest dimension of largest invasive focus): 1 cm (lower inner                       quadrant tumor).               Histologic type: Invasive carcinoma of no special type (ductal, not otherwise specified).                 Histologic grade (Rebeca Histologic Score):                              Glandular (acinar)/tubular differentiation: Score 3.                             Nuclear pleomorphism: Score 3.                             Mitotic rate: Score 2.                            Overall grade: Grade 3 (score 8 of 9).                            NOTE: The above Era  Histologic Score applies to the largest and                                    highest grade tumor (lower inner quadrant tumor).  The tumor in the                                    central/inferior breast is intermediate grade (tubular score 3, nuclear                                    score 2, mitotic score 1).               Tumor focality: Multiple foci of invasive carcinoma.                             Number of foci: 2.                              Sizes of individual foci: 1 cm (lower inner quadrant tumor) and approximately                                    0.8 cm (central/inferior tumor).               Ductal carcinoma in situ (DCIS): Present, positive for extensive intraductal component.                            Size (extent) of DCIS: Approximately greater than 6 cm (estimated based                                    on gross and microscopic findings).                            Architectural pattern: Solid and cribriform.                            Nuclear grade: Grade III (high).                            Necrosis: Central comedonecrosis.               Lobular carcinoma in situ (LCIS): No LCIS in specimen.               Margins:                             Invasive carcinoma margins: Uninvolved by invasive carcinoma.                                          Distance from closest margin: 2 mm (deep margin).                             DCIS margins: Positive for DCIS (inferior medial margin [lower inner quadrant]).                                          Additional DCIS margins: DCIS extends to within 2 mm of deep margin                                                 and to within 3 mm of inferior superficial soft tissue margin.  Atypical                                                 ductal hyperplasia is focally present at the deep margin.               Regional lymph nodes: Involved by tumor cells.                             Number of lymph nodes with macrometastases: 0.                             Number of lymph nodes with micrometastases: 0.                            Number of lymph nodes with isolated tumor cells: 1.                            Number of lymph nodes examined: 3.                             Number of sentinel lymph nodes examined: 3.                Treatment effect: No known presurgical therapy.                Lymphovascular invasion: Present.               Dermal lymphovascular invasion: Not identified.                Pathologic stage classification:                             TNM descriptors: m (multiple foci of invasive carcinoma).                             Primary tumor: pT1b.                            Regional lymph nodes: pN0(i+)(sn).                             Distant metastasis: Not applicable.                Additional pathologic findings:                              Ductal hyperplasia of the usual type.                              Fibroinflammatory changes consistent with sites of prior biopsy (2 prior biopsy                                    sites).                 Ancillary studies:ER [place] negative HER-2 positive lower inner quadrant tumor    Echocardiogram  Reading physician: Keily Jensen MD Ordering physician: Cecy Treviño MD Study date: 12/17/18     Study Description     A two-dimensional transthoracic echocardiogram with color flow and Doppler was performed. The study is technically difficult for diagnosis.Verbal consent was obtained from the patient to use Definity contrast in order to optimize the study. The use of Definity was indicated as two or more contiguous segments of the left ventricular endocardial border were unable to be adequately visualized by standard imaging methods. 1.3 mL of mechanically activated Definity was mixed with 8.7 mL of normal saline. A total of 2 mL of the resulting Definity solution was administered, and the remaining contrast was wasted and discarded.   No adverse reaction to contrast was noted.   9/14/18 EF 61% No GLS     DEXA BONE  DENSITY AXIAL  FINDINGS: Average lumbar bone mineral density 1.0 g/cm sq correlating to  a T value of -0.2 and a Z score of 1.4. Right femoral neck bone mineral  density 0.77 g/cm sq correlating to a T value of -0.7 and a Z score of  0.5. Left femoral neck bone mineral density 0.79 g/cm sq correlating to  a T value of -0.6 and a Z score of 0.6.     CONCLUSION: Normal bone mineral density.     This report was finalized on 3/18/2019      Echocardiogram  Study Description     A two-dimensional transthoracic echocardiogram with color flow and Doppler was performed.   The study is technically difficult for diagnosis. The quality of the study is limited due to poor acoustic windows related to breast implants. Verbal consent was obtained from the patient to use Definity contrast in order to optimize the study. Note also that contrast was used to enhance ejection fraction reproducability of the LV ejection fraction on serial echocardiograms.  The use of Definity was indicated as two or more contiguous segments of the left ventricular endocardial border were unable to be adequately visualized by standard imaging methods. 1.3 mL of mechanically activated Definity was mixed with 8.7 mL of normal saline. A total of 2 mL of the resulting Definity solution was administered, and the remaining contrast was wasted and discarded.   No adverse reaction to contrast was noted.   No GLS obtained due to breast implants and spacers  12/17/18  EF=57%    09/14/18  EF=61%.   Electronically signed by Keily Jensen MD on 3/15/19 at 1713 EDT       FINDINGS: Average lumbar bone mineral density 1.0 g/cm sq correlating to  a T value of -0.2 and a Z score of 1.4. Right femoral neck bone mineral  density 0.77 g/cm sq correlating to a T value of -0.7 and a Z score of  0.5. Left femoral neck bone mineral density 0.79 g/cm sq correlating to  a T value of -0.6 and a Z score of 0.6.     CONCLUSION: Normal bone mineral density.     6/7/19  · Estimated EF =  67%.  · Left ventricular systolic function is normal.  · No change from prior study       Interpretation Summary     · Left ventricular systolic function is normal.  · Calculated EF = 68%.  · C/w prior study 3/15/19, there is no change.      Electronically signed by Naomi Mars MD on 8/30/19 at 1555 EDT    DEXA BONE DENSITY AXIAL     CONCLUSION: Normal bone mineral density.     This report was finalized on 3/18/2019 6:11 AM by Dr. Da Maxwell M.D.            Assessment/Plan   1.  Multifocal right breast cancer T1 BN(ic)-isolated tumor cells medial tumor being grade 3 ER/NH negative HER-2 3+ lateral tumor being T1b N0 grade 1 ER/NH positive HER-2 2+ negative fish Oncotype score of 4-post bilateral mastectomy nipple sparing on the left  ·   Positive family history of multiple malignancies genetic testing negative extended panel  · Weekly Taxol Herceptin started 9/28/2018 ×12 planned followed by aromatase inhibitor for her second tumor  · Arimidex started in 2/19-intolerant switch to Aromasin in 4/19  · Continue Aromasin as of 10/21 with good tolerance/    2.  Wound dehiscence no signs of infection resutured-expander removed in 3/19 with permanent implant placed    3. Blurry vision.  Cataract surgery in 3 weeks vision improved    4. Left hip pain referral to orthopedics planned -degenerative disease of the hip aggravated by Arimidex-joint pain improved with Januvia    Plan  1.  Continue Aromasin 25 mg t  2  Bone density due and will be rescheduled  3.  See me in 6 months for follow-up with a DEXA scan

## 2022-05-23 RX ORDER — POTASSIUM CHLORIDE 750 MG/1
20 CAPSULE, EXTENDED RELEASE ORAL DAILY
Qty: 180 CAPSULE | Refills: 3 | Status: SHIPPED | OUTPATIENT
Start: 2022-05-23

## 2022-11-29 ENCOUNTER — TELEPHONE (OUTPATIENT)
Dept: ONCOLOGY | Facility: CLINIC | Age: 67
End: 2022-11-29

## 2022-11-29 ENCOUNTER — APPOINTMENT (OUTPATIENT)
Dept: LAB | Facility: HOSPITAL | Age: 67
End: 2022-11-29

## 2022-11-29 RX ORDER — EXEMESTANE 25 MG/1
25 TABLET ORAL DAILY
Qty: 90 TABLET | Refills: 3 | Status: SHIPPED | OUTPATIENT
Start: 2022-11-29

## 2022-11-29 NOTE — TELEPHONE ENCOUNTER
Caller: Carly Mendoza    Relationship: Self    Best call back number: [phone]    What is the best time to reach you: ANYTIME    Who are you requesting to speak with (clinical staff, provider,  specific staff member): SERAFIN NICHOLS - SCHEDULING    What was the call regarding: PT CALLING TO FIGURE OUT WHAT IS GOING ON WITH HER APPT - SHE WAS ORIGINALLY SCHED 12/2 BUT THEN IT WAS CHANGED TO TODAY. SHE WAS NOT AWARE OF THIS. SHE ALREADY TOOK OFF WORK Friday AND R/S ALL OF HER PTS TO COME IN ON 12/2.     PLEASE HAVE SERAFIN NICHOLS CALL TO DISCUSS WITH PT. PT NEEDS A CALLBACK ASAP SO SHE CAN COORDINATE WITH HER SCHED.     Do you require a callback: YES

## 2023-01-06 ENCOUNTER — OFFICE VISIT (OUTPATIENT)
Dept: ONCOLOGY | Facility: CLINIC | Age: 68
End: 2023-01-06
Payer: COMMERCIAL

## 2023-01-06 ENCOUNTER — LAB (OUTPATIENT)
Dept: LAB | Facility: HOSPITAL | Age: 68
End: 2023-01-06
Payer: COMMERCIAL

## 2023-01-06 VITALS
WEIGHT: 136 LBS | HEIGHT: 64 IN | BODY MASS INDEX: 23.22 KG/M2 | HEART RATE: 101 BPM | DIASTOLIC BLOOD PRESSURE: 87 MMHG | OXYGEN SATURATION: 96 % | SYSTOLIC BLOOD PRESSURE: 124 MMHG | TEMPERATURE: 97.3 F | RESPIRATION RATE: 18 BRPM

## 2023-01-06 DIAGNOSIS — Z17.0 MALIGNANT NEOPLASM OF OVERLAPPING SITES OF RIGHT BREAST IN FEMALE, ESTROGEN RECEPTOR POSITIVE: Primary | ICD-10-CM

## 2023-01-06 DIAGNOSIS — C50.811 MALIGNANT NEOPLASM OF OVERLAPPING SITES OF RIGHT BREAST IN FEMALE, ESTROGEN RECEPTOR POSITIVE: Primary | ICD-10-CM

## 2023-01-06 DIAGNOSIS — C50.811 MALIGNANT NEOPLASM OF OVERLAPPING SITES OF RIGHT BREAST IN FEMALE, ESTROGEN RECEPTOR POSITIVE: ICD-10-CM

## 2023-01-06 DIAGNOSIS — Z17.0 MALIGNANT NEOPLASM OF OVERLAPPING SITES OF RIGHT BREAST IN FEMALE, ESTROGEN RECEPTOR POSITIVE: ICD-10-CM

## 2023-01-06 LAB
ALBUMIN SERPL-MCNC: 4.5 G/DL (ref 3.5–5.2)
ALBUMIN/GLOB SERPL: 1.4 G/DL (ref 1.1–2.4)
ALP SERPL-CCNC: 88 U/L (ref 38–116)
ALT SERPL W P-5'-P-CCNC: 26 U/L (ref 0–33)
ANION GAP SERPL CALCULATED.3IONS-SCNC: 13.4 MMOL/L (ref 5–15)
AST SERPL-CCNC: 21 U/L (ref 0–32)
BASOPHILS # BLD AUTO: 0.04 10*3/MM3 (ref 0–0.2)
BASOPHILS NFR BLD AUTO: 0.6 % (ref 0–1.5)
BILIRUB SERPL-MCNC: 0.3 MG/DL (ref 0.2–1.2)
BUN SERPL-MCNC: 17 MG/DL (ref 6–20)
BUN/CREAT SERPL: 22.4 (ref 7.3–30)
CALCIUM SPEC-SCNC: 10.3 MG/DL (ref 8.5–10.2)
CHLORIDE SERPL-SCNC: 104 MMOL/L (ref 98–107)
CO2 SERPL-SCNC: 24.6 MMOL/L (ref 22–29)
CREAT SERPL-MCNC: 0.76 MG/DL (ref 0.6–1.1)
DEPRECATED RDW RBC AUTO: 42.5 FL (ref 37–54)
EGFRCR SERPLBLD CKD-EPI 2021: 86.5 ML/MIN/1.73
EOSINOPHIL # BLD AUTO: 0.15 10*3/MM3 (ref 0–0.4)
EOSINOPHIL NFR BLD AUTO: 2.2 % (ref 0.3–6.2)
ERYTHROCYTE [DISTWIDTH] IN BLOOD BY AUTOMATED COUNT: 14.4 % (ref 12.3–15.4)
GLOBULIN UR ELPH-MCNC: 3.2 GM/DL (ref 1.8–3.5)
GLUCOSE SERPL-MCNC: 116 MG/DL (ref 74–124)
HCT VFR BLD AUTO: 41.6 % (ref 34–46.6)
HGB BLD-MCNC: 13.2 G/DL (ref 12–15.9)
IMM GRANULOCYTES # BLD AUTO: 0.01 10*3/MM3 (ref 0–0.05)
IMM GRANULOCYTES NFR BLD AUTO: 0.1 % (ref 0–0.5)
LYMPHOCYTES # BLD AUTO: 2.42 10*3/MM3 (ref 0.7–3.1)
LYMPHOCYTES NFR BLD AUTO: 34.8 % (ref 19.6–45.3)
MCH RBC QN AUTO: 26 PG (ref 26.6–33)
MCHC RBC AUTO-ENTMCNC: 31.7 G/DL (ref 31.5–35.7)
MCV RBC AUTO: 81.9 FL (ref 79–97)
MONOCYTES # BLD AUTO: 0.75 10*3/MM3 (ref 0.1–0.9)
MONOCYTES NFR BLD AUTO: 10.8 % (ref 5–12)
NEUTROPHILS NFR BLD AUTO: 3.59 10*3/MM3 (ref 1.7–7)
NEUTROPHILS NFR BLD AUTO: 51.5 % (ref 42.7–76)
NRBC BLD AUTO-RTO: 0 /100 WBC (ref 0–0.2)
PLATELET # BLD AUTO: 339 10*3/MM3 (ref 140–450)
PMV BLD AUTO: 9.2 FL (ref 6–12)
POTASSIUM SERPL-SCNC: 3.8 MMOL/L (ref 3.5–4.7)
PROT SERPL-MCNC: 7.7 G/DL (ref 6.3–8)
RBC # BLD AUTO: 5.08 10*6/MM3 (ref 3.77–5.28)
SODIUM SERPL-SCNC: 142 MMOL/L (ref 134–145)
WBC NRBC COR # BLD: 6.96 10*3/MM3 (ref 3.4–10.8)

## 2023-01-06 PROCEDURE — 36415 COLL VENOUS BLD VENIPUNCTURE: CPT

## 2023-01-06 PROCEDURE — 99214 OFFICE O/P EST MOD 30 MIN: CPT | Performed by: INTERNAL MEDICINE

## 2023-01-06 PROCEDURE — 85025 COMPLETE CBC W/AUTO DIFF WBC: CPT

## 2023-01-06 PROCEDURE — 80053 COMPREHEN METABOLIC PANEL: CPT

## 2023-01-06 RX ORDER — FEXOFENADINE HCL AND PSEUDOEPHEDRINE HCI 60; 120 MG/1; MG/1
1 TABLET, EXTENDED RELEASE ORAL
COMMUNITY
Start: 2022-03-11 | End: 2023-03-12

## 2023-01-06 RX ORDER — ESZOPICLONE 3 MG/1
TABLET, FILM COATED ORAL
COMMUNITY
Start: 2022-11-11

## 2023-01-06 NOTE — PROGRESS NOTES
Subjective     REASON FOR CONSULTATION:    1.Multifocal right breast cancer post bilateral mastectomies- T1b N0+-10:00 tumor 8 mm grade 1 ER/NJ positive HER-2 negative Oncotype 4  2.  5:00 tumor right breast ER/NJ negative HER-2 positive 10 mm in size  3.  Negative genetic testing  4.  Adequate echo weekly Taxol Herceptin started on 9/21/18                          Referring physician Kwaku Gudino M.D.                            History of Present Illness : This is a 62-year-old lady with a multifocal right breast cancer T1b N0+ ER/NJ negative HER-2 positive and the second T1b ER/NJ positive HER-2 negative tumor here for follow-up     She was started on Arimidex  for her second tumor which was a low risk hormone positive tumor.  But after 2 months the pain in her hip and shin became unbearable and we switched to Aromasin which she has done very well overall.  She has been on hormonal blockade since January 2019 - 4 years    She is tolerating the Aromasin well at this point and continuing on it without any problems and swears that taking 100 mg of Januvia completely abrogated her joint pains from Aromasin    Discussed the duration of hormonal blockade and since this is for her HER2 negative hormone positive cancer I told her she could go up to 7-1/2 years of treatment based on her good tolerance.    Most recent DEXA scan this year shows worsening osteopenia in her hips and I recommend she go back on calcium and vitamin D but if she continues to drop we will probably add Prolia    She saw the orthopedic doctor who thought she needed her left hip replaced and she does not want to do this at the time she tells me Januvia helps significantly with joint pains and she is doing this     She denies headaches, speech disturbances, photophobia, new neuropathy.     Her appetite is good. Her bowels and bladder are working well.     She has had her cataract surgeries and her vision is much improved    She is in  [place]  practicing and she will continue follow-up here for the time being        Past Medical History:   Diagnosis Date   • Environmental allergies    • GERD (gastroesophageal reflux disease)    • History of foreign travel     [place]   • Hypertension    • Malignant neoplasm of overlapping sites of right breast in female, estrogen receptor positive (HCC) 7/18/2018        Past Surgical History:   Procedure Laterality Date   • BREAST BIOPSY Right 2018    Malignant   • BREAST RECONSTRUCTION Bilateral 8/1/2018    Procedure: BILATERAL BREAST TISSUE EXPANDER INSERTION WITH ALLODERM;  Surgeon: Waterhouse, Maurine, MD;  Location: [facility] MAIN OR;  Service: Plastics   • BREAST TISSUE EXPANDER REMOVAL INSERTION OF IMPLANT Bilateral 3/22/2019    Procedure: BILATERAL BREAST TISSUE EXPANDER REMOVAL INSERTION OF BILATERAL BREAST IMPLANT;  Surgeon: Waterhouse, Maurine, MD;  [facility];  Service: Plastics   • COLONOSCOPY N/A 3/24/2017    Procedure: COLONOSCOPY TO CECUM AND TI;  Surgeon: Aleksander Bella MD;  Location: [facility] ENDOSCOPY;  Service:    • ENDOSCOPY N/A 3/24/2017    Procedure: ESOPHAGOGASTRODUODENOSCOPY;  Surgeon: Aleksander Bella MD;  Location: [facility] ENDOSCOPY;  Service:    • EYE SURGERY Right 05/01/2019    lens transplant   • HYSTERECTOMY  1989   • MASTECTOMY W/ SENTINEL NODE BIOPSY Bilateral 8/1/2018    Procedure: BILATERAL TOTAL MASTECTOMY WITH RIGHT SENTINEL LYMPH MARTINEZ BIOPSY;  Surgeon: Kwaku Gudino MD;  Location: [facility] OR;  Service: General   • OOPHORECTOMY Bilateral 1989   • VENOUS ACCESS DEVICE (PORT) INSERTION Left 9/14/2018    Procedure: INSERTION VENOUS ACCESS DEVICE;  Surgeon: Kwaku Gudino MD;  Location: [facility] OR;  Service: General      ONCOLOGIC HISTORY;  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  The patient had never had a previous mammogram and was referred for  evaluation at which time she was found to have a 4.2 cm mass in the upper outer quadrant of the right breast at the 9:30 position associated with pleomorphic calcifications in the lower inner quadrant also felt to be suspicious left breast was benign.  Patient underwent ultrasound guided biopsy on 6/15/18 with the biopsy at the 9:30 position with clip placement showing invasive mammary carcinoma ductal type low-grade ER % IA 9800% HER-2 2+ negative by fish.  Patient also underwent biopsy at the 4:00 position which showed a microscopic focus of invasive ductal carcinoma felt to be probably a lymphatic space involvement and was too small to do additional testing and a clip was placed.  Patient went on to have bilateral breast MRIs and was referred to Dr. Gudino MRIs confirmed 2 areas of abnormality at the breast-lower inner quadrant showed a large hematoma with a clip identified and there is extensive clumped abnormal enhancement beginning superior to the biopsy cavity and extending laterally to the entire upper half of the right breast into the upper outer quadrant measuring 1.2 x 1.4 cm and extending over 7 cm.  A biopsy clip was identified in this area the abnormal enhancement focally abutted the pectoralis muscle medially with no invasion through few borderline enlarged right axillary nodes with normal morphology no internal mammary adenopathy was noted left breast was benign.  Patient went on to have bilateral mastectomies the final path report showed 2 separate primaries in the breast associated with extensive high-grade DCIS measuring 6 cm  The lower inner quadrant lesion measured 1 cm was a grade 3 invasive ductal carcinoma ER 0 IA 0 HER-2 3+ and the lesion at the 9:30 position was a grade 1 invasive ductal carcinoma measuring 8 mm strongly ER/IA positive and HER-2 negative based on preop testing.  3 sentinel nodes were obtained 1 showed isolated tumor cells.  Margins showed  Positivity with DCIS in  the inferior medial margin and was close to the deep margin (2mm) and inferior superficial margin(3mm).  Lymphovascular invasion was present.  Left breast is benign    Postoperatively she's done well and had her expanders filled and is here today to discuss adjuvant treatment.  Unfortunately she did not know about the testing on the second tumor at surgery and thought she had a grade 1 ER/MN positive HER-2 negative tumor and was very surprised when I told her the prior markers on the second tumor.    She is  0 para 0 menarche was at age 13 menopause at age 33 when she had a hysterectomy and oophorectomy.  She's been on hormone replacement since age of 33 until her recent diagnosis .    Family history is positive for mother having colon cancer age 72 maternal aunt with breast cancer in her 60s and another maternal aunt with uterine cancer in her 70s.  She's had negative genetic testing but the extended panel is being done and is pending    We discussed treatment plans and I have scheduled her for Chemotherapy education with tentative plans for weekly Taxol Herceptin for 12 weeks followed by Herceptin for a year  We will do an Oncotype DX score on the hormone positive tumor which is low-grade unlikely to be high risk    We discussed whether or not radiation would be indicated and except for the fact that there is a positive margin with high-grade DCIS normally she would not need radiation but I recommended a consultation because of the margins and the high grade nature of the DCIS.    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  She is presented at the multidiscipline conference and everybody felt comfortable with weekly Taxol Herceptin for 12 weeks followed by Herceptin for a year and Oncotype testing on the second tumor for which she would receive an aromatase inhibitor for 5 years unless it was high risk which is very unlikely    She is here today having had an echo which showed an adequate ejection fraction  Of 61% and she had a port placed and is here to start treatment.    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    Unfortunately the wrong specimen was sent for Oncotype testing and ER negative specimen was sent and I talked to the pathologist and they will send the correct specimen for Oncotype DX testing with no talked to the patient.    That he has no questions and is happy to finally get started with therapy.    Genetic testing results are negative using the INVITAE  Panel    1/19  She is already having pain in her left knee and wants to see an orthopedic surgeon for this pain is pretty sharp and she is taking Celebrex to help deal with this    We discussed the side effects of Arimidex including hot flashes and joint pains but she is wanting to do this rather than tamoxifen because she does not like the side effect profile of tamoxifen    She's never had a bone density and we will order this also and make sure it is adequate    She has no significant neuropathy and she is happy to be off the Taxol-she tolerated the Herceptin alone  well but wants to cut back on the Benadryl because it makes her too sleepy    She lost a lot of hair in the chronic for him because her [place] cooling Did not fit well on the vertex of her skull    She reports mild blurry vision that started approximately after the 10th dose of Taxol and her ophthalmologist as she has cataracts that are affecting the macular part of her iron she needs to have surgeries sometime in the next month.  I told her that Herceptin does not cause this and all the  steroid premeds may have accelerated to cataract          Current Outpatient Medications on File Prior to Visit   Medication Sig Dispense Refill   • chlorthalidone (HYGROTON) 25 MG tablet Take 1 tablet by mouth Every Night.     • eszopiclone (LUNESTA) 3 MG tablet      • exemestane (AROMASIN) 25 MG chemo tablet Take 1 tablet by mouth Daily. 90 tablet 3   • fexofenadine-pseudoephedrine (ALLEGRA-D)  MG per 12 hr tablet Take 1 tablet by mouth.     • omeprazole-sodium bicarbonate (ZEGERID)  MG per capsule Take 1 capsule by mouth Every Night.     • potassium chloride (MICRO-K) 10 MEQ CR capsule Take 2 capsules by mouth daily. 180 capsule 3   • SITagliptin (JANUVIA) 100 MG tablet Take 100 mg by mouth Daily.     • Turmeric 500 MG tablet Take 1,000 mg by mouth Daily.       No current facility-administered medications on file prior to visit.        ALLERGIES:    Allergies   Allergen Reactions   • Moxifloxacin Anaphylaxis and Rash   • Aloxi [Palonosetron Hcl] Itching   • Latex Rash     Added based on information entered during log entry, please review and add reactions, type, and severity as needed        Social History     Socioeconomic History   • Marital status:      Spouse name: FLORINA Gross   • Number of children: 0   • Years of education: Graduate School   Tobacco Use   • Smoking status: Never   • Smokeless tobacco: Never   Substance and Sexual Activity   • Alcohol use: Yes     Types: 1 Glasses of wine per week   • Drug  use: No   • Sexual activity: Defer     Comment:         Family History   Problem Relation Age of Onset   • Colon cancer Mother 72   • Breast cancer Maternal Aunt 60   • Uterine cancer Maternal Aunt 75   • Stroke Father    • Malig Hyperthermia Neg Hx      Review of Systems   Constitutional: Negative for appetite change, chills, diaphoresis, fatigue, fever and unexpected weight change.   HENT: Negative for hearing loss, sore throat and trouble swallowing.    Respiratory: Negative for cough, chest tightness, shortness of breath and wheezing.    Cardiovascular: Negative for chest pain, palpitations and leg swelling.   Gastrointestinal: Negative for abdominal distention, abdominal pain, constipation, diarrhea, nausea and vomiting.   Genitourinary: Negative for dysuria, frequency, hematuria and urgency.   Musculoskeletal: Negative for joint swelling.        No muscle weakness.   Skin: Negative for rash and wound.   Neurological: Negative for seizures, syncope, speech difficulty, weakness, numbness and headaches.   Hematological: Negative for adenopathy. Does not bruise/bleed easily.   Psychiatric/Behavioral: Negative for behavioral problems, confusion and suicidal ideas.   All other systems reviewed and are negative.         Review of Systems   Constitutional: Positive for diaphoresis.   Gastrointestinal:        Chronic indigestion   Endocrine: Positive for heat intolerance.   Neurological: 1-st1stgstrstastdstest numbness.    Left hip pain 2/10 taking Januvia    Objective     Vitals:    01/06/23 1225   BP: 124/87   Pulse: 101   Resp: 18   Temp: 97.3 °F (36.3 °C)   TempSrc: Temporal   SpO2: 96%   Weight: 61.7 kg (136 lb)   Height: 162 cm (63.78\")   PainSc: 0-No pain     Current Status 1/6/2023   ECOG score 0       Physical Exam    GENERAL:  Well-developed, well-nourished in no acute distress.   SKIN:  Warm, dry without rashes, purpura or petechiae.  EYES:  Pupils equal, round and reactive to light.  EOMs intact.  Conjunctivae  normal. Mild opacity of the right eye.   EARS:  Hearing intact.  NOSE:  Septum midline.  No excoriations or nasal discharge.  MOUTH:  Tongue is well-papillated; no stomatitis or ulcers.  Lips normal.  THROAT:  Oropharynx without lesions or exudates.  NECK:  Supple with good range of motion; no thyromegaly or masses, no JVD.  LYMPHATICS:  No cervical, supraclavicular, axillary or inguinal adenopathy.  CHEST:  Lungs clear to auscultation. Good airflow.  BREATS: Left breast implant nipple intact the right breast has an implant in place   CARDIAC:  Regular rate and rhythm without murmurs, rubs or gallops. Normal S1,S2.  ABDOMEN:  Soft, nontender with no hepatosplenomegaly or masses.  EXTREMITIES:  No clubbing, cyanosis or edema.  No obvious fluid or warmth in the left knee  NEUROLOGICAL:  Cranial Nerves II-XII grossly intact.  No focal neurological deficits.  PSYCHIATRIC:  Normal affect and mood.    I have reexamined the patient and the results are consistent with the previously documented exam. Martin Treviño MD       RECENT LABS:  Hematology WBC   Date Value Ref Range Status   01/06/2023 6.96 3.40 - 10.80 10*3/mm3 Final     RBC   Date Value Ref Range Status   01/06/2023 5.08 3.77 - 5.28 10*6/mm3 Final     Hemoglobin   Date Value Ref Range Status   01/06/2023 13.2 12.0 - 15.9 g/dL Final     Hematocrit   Date Value Ref Range Status   01/06/2023 41.6 34.0 - 46.6 % Final     Platelets   Date Value Ref Range Status   01/06/2023 339 140 - 450 10*3/mm3 Final        SYNOPTIC REPORT (Based on [facility] Cancer Case Summary, version January 2018):                Procedure: Total mastectomy.               Specimen/tumor laterality: Right.                Tumor site: Lower inner quadrant and central/inferior breast.                 Tumor size (greastest dimension of largest invasive focus): 1 cm (lower inner                       quadrant tumor).               Histologic type: Invasive carcinoma of no special type (ductal, not  otherwise specified).                 Histologic grade (Rebeca Histologic Score):                              Glandular (acinar)/tubular differentiation: Score 3.                             Nuclear pleomorphism: Score 3.                             Mitotic rate: Score 2.                            Overall grade: Grade 3 (score 8 of 9).                            NOTE: The above Rebeca Histologic Score applies to the largest and                                    highest grade tumor (lower inner quadrant tumor).  The tumor in the                                    central/inferior breast is intermediate grade (tubular score 3, nuclear                                    score 2, mitotic score 1).               Tumor focality: Multiple foci of invasive carcinoma.                             Number of foci: 2.                              Sizes of individual foci: 1 cm (lower inner quadrant tumor) and approximately                                    0.8 cm (central/inferior tumor).               Ductal carcinoma in situ (DCIS): Present, positive for extensive intraductal component.                            Size (extent) of DCIS: Approximately greater than 6 cm (estimated based                                    on gross and microscopic findings).                            Architectural pattern: Solid and cribriform.                            Nuclear grade: Grade III (high).                            Necrosis: Central comedonecrosis.               Lobular carcinoma in situ (LCIS): No LCIS in specimen.               Margins:                             Invasive carcinoma margins: Uninvolved by invasive carcinoma.                                          Distance from closest margin: 2 mm (deep margin).                             DCIS margins: Positive for DCIS (inferior medial margin [lower inner quadrant]).                                          Additional DCIS margins: DCIS extends to within 2 mm of  deep margin                                                 and to within 3 mm of inferior superficial soft tissue margin.  Atypical                                                 ductal hyperplasia is focally present at the deep margin.               Regional lymph nodes: Involved by tumor cells.                             Number of lymph nodes with macrometastases: 0.                            Number of lymph nodes with micrometastases: 0.                            Number of lymph nodes with isolated tumor cells: 1.                            Number of lymph nodes examined: 3.                             Number of sentinel lymph nodes examined: 3.                Treatment effect: No known presurgical therapy.                Lymphovascular invasion: Present.               Dermal lymphovascular invasion: Not identified.                Pathologic stage classification:                             TNM descriptors: m (multiple foci of invasive carcinoma).                             Primary tumor: pT1b.                            Regional lymph nodes: pN0(i+)(sn).                             Distant metastasis: Not applicable.                Additional pathologic findings:                              Ductal hyperplasia of the usual type.                              Fibroinflammatory changes consistent with sites of prior biopsy (2 prior biopsy                                    sites).                 Ancillary studies:ER MS negative HER-2 positive lower inner quadrant tumor    Echocardiogram  Reading physician: Keily Jensen MD Ordering physician: Cecy Treviño MD Study date: 12/17/18     Study Description     A two-dimensional transthoracic echocardiogram with color flow and Doppler was performed. The study is technically difficult for diagnosis.Verbal consent was obtained from the patient to use Definity contrast in order to optimize the study. The use of Definity was indicated as two or more contiguous  segments of the left ventricular endocardial border were unable to be adequately visualized by standard imaging methods. 1.3 mL of mechanically activated Definity was mixed with 8.7 mL of normal saline. A total of 2 mL of the resulting Definity solution was administered, and the remaining contrast was wasted and discarded.   No adverse reaction to contrast was noted.   9/14/18 EF 61% No GLS     DEXA BONE DENSITY AXIAL  FINDINGS: Average lumbar bone mineral density 1.0 g/cm sq correlating to  a T value of -0.2 and a Z score of 1.4. Right femoral neck bone mineral  density 0.77 g/cm sq correlating to a T value of -0.7 and a Z score of  0.5. Left femoral neck bone mineral density 0.79 g/cm sq correlating to  a T value of -0.6 and a Z score of 0.6.     CONCLUSION: Normal bone mineral density.     This report was finalized on 3/18/2019      Echocardiogram  Study Description     A two-dimensional transthoracic echocardiogram with color flow and Doppler was performed.   The study is technically difficult for diagnosis. The quality of the study is limited due to poor acoustic windows related to breast implants. Verbal consent was obtained from the patient to use Definity contrast in order to optimize the study. Note also that contrast was used to enhance ejection fraction reproducability of the LV ejection fraction on serial echocardiograms.  The use of Definity was indicated as two or more contiguous segments of the left ventricular endocardial border were unable to be adequately visualized by standard imaging methods. 1.3 mL of mechanically activated Definity was mixed with 8.7 mL of normal saline. A total of 2 mL of the resulting Definity solution was administered, and the remaining contrast was wasted and discarded.   No adverse reaction to contrast was noted.   No GLS obtained due to breast implants and spacers  12/17/18  EF=57%    09/14/18  EF=61%.   Electronically signed by Keily Jensen MD on 3/15/19 at 1713 EDT        FINDINGS: Average lumbar bone mineral density 1.0 g/cm sq correlating to  a T value of -0.2 and a Z score of 1.4. Right femoral neck bone mineral  density 0.77 g/cm sq correlating to a T value of -0.7 and a Z score of  0.5. Left femoral neck bone mineral density 0.79 g/cm sq correlating to  a T value of -0.6 and a Z score of 0.6.     CONCLUSION: Normal bone mineral density.     6/7/19  · Estimated EF = 67%.  · Left ventricular systolic function is normal.  · No change from prior study       Interpretation Summary     · Left ventricular systolic function is normal.  · Calculated EF = 68%.  · C/w prior study 3/15/19, there is no change.      Electronically signed by Naomi Mars MD on 8/30/19 at 1555 EDT    DEXA BONE DENSITY AXIAL     CONCLUSION: Normal bone mineral density.     This report was finalized on 3/18/2019 6:11 AM by Dr. Da Maxwell M.D.            Assessment & Plan   1.  Multifocal right breast cancer T1 BN(ic)-isolated tumor cells medial tumor being grade 3 ER/WY negative HER-2 3+ lateral tumor being T1b N0 grade 1 ER/WY positive HER-2 2+ negative fish Oncotype score of 4-post bilateral mastectomy nipple sparing on the left  ·   Positive family history of multiple malignancies genetic testing negative extended panel  · Weekly Taxol Herceptin started 9/28/2018 ×12 planned followed by aromatase inhibitor for her second tumor  · Arimidex started in 2/19-intolerant switch to Aromasin in 4/19  · Continue Aromasin as of 1/23 with good tolerance/    2.  Wound dehiscence no signs of infection resutured-expander removed in 3/19 with permanent implant placed    3. Blurry vision.  Cataract surgery in 3 weeks vision improved    4. Left hip pain referral to orthopedics planned -degenerative disease of the hip aggravated by Arimidex-joint pain improved with Januvia    Plan  1.  Continue Aromasin 25 mg 7 and half years of treatment planned  2 .  Calcium and vitamin D for osteopenia  3.  see me in 6 months  for follow-up

## 2023-11-14 RX ORDER — EXEMESTANE 25 MG/1
25 TABLET ORAL DAILY
Qty: 90 TABLET | Refills: 1 | Status: SHIPPED | OUTPATIENT
Start: 2023-11-14

## 2023-11-14 NOTE — TELEPHONE ENCOUNTER
Caller: Kelly, Betty C    Relationship: Self    Best call back number: [phone]    Requested Prescriptions:   Requested Prescriptions     Pending Prescriptions Disp Refills    exemestane (AROMASIN) 25 MG tablet 90 tablet 3     Sig: Take 1 tablet by mouth Daily.    potassium chloride (MICRO-K) 10 MEQ CR capsule 180 capsule 3     Sig: Take 2 capsules by mouth Daily.        Pharmacy where request should be sent: [place] [id number] - [phone] [facility] [phone] FX     Last office visit with prescribing clinician: 6/30/2023   Last telemedicine visit with prescribing clinician: Visit date not found   Next office visit with prescribing clinician: 1/26/2024         Does the patient have less than a 3 day supply:  [x] Yes  [] No    Would you like a call back once the refill request has been completed: [x] Yes [] No    If the office needs to give you a call back, can they leave a voicemail: [x] Yes [] No

## 2023-11-17 RX ORDER — POTASSIUM CHLORIDE 750 MG/1
20 CAPSULE, EXTENDED RELEASE ORAL DAILY
Qty: 180 CAPSULE | Refills: 3 | OUTPATIENT
Start: 2023-11-17

## 2024-01-15 ENCOUNTER — TELEPHONE (OUTPATIENT)
Dept: ONCOLOGY | Facility: CLINIC | Age: 69
End: 2024-01-15
Payer: COMMERCIAL

## 2024-01-15 NOTE — TELEPHONE ENCOUNTER
Caller: Carly Mendoza    Relationship: Self    Best call back number: [phone]    What is the best time to reach you: ANY.LEAVE VM    Who are you requesting to speak with (clinical staff, provider,  specific staff member): SCHEDULING        What was the call regarding: PATIENT CALLED TO R/S APPT ON 1/19/24. PATIENT LEAVES 3 HOURS AWAY AND WOULD LIKE TO SCHEDULE APPT FOR 11AM TO 12 NOON IF POSSIBLE     Is it okay if the provider responds through MyChart: NO

## 2024-01-15 NOTE — TELEPHONE ENCOUNTER
PATIENT CALLED BACK AND STATES THAT SHE CAN ONLY DO THE 1-19-24 LATER IN THE DAY.  PLEASE CALL PATIENT TO RESCHEDULE

## 2024-01-18 ENCOUNTER — TELEPHONE (OUTPATIENT)
Dept: ONCOLOGY | Facility: CLINIC | Age: 69
End: 2024-01-18

## 2024-02-23 ENCOUNTER — TELEPHONE (OUTPATIENT)
Dept: ONCOLOGY | Facility: CLINIC | Age: 69
End: 2024-02-23

## 2024-02-23 NOTE — TELEPHONE ENCOUNTER
Caller: Carly Mendoza    Relationship to patient: Self    Best call back number: [phone]    Chief complaint: RESCHEDULE     Type of visit: LAB AND FOLLOW UP    Requested date: ANY FRIDAY AROUND 11:00    If rescheduling, when is the original appointment: TODAY 2-23     Additional notes:PLEASE ADVISE

## (undated) DEVICE — SKIN PREP TRAY W/CHG: Brand: MEDLINE INDUSTRIES, INC.

## (undated) DEVICE — PENCL E/S HNDSWTCH SMOKEEVAC HOLSTR 10FT

## (undated) DEVICE — INTENDED FOR TISSUE SEPARATION, AND OTHER PROCEDURES THAT REQUIRE A SHARP SURGICAL BLADE TO PUNCTURE OR CUT.: Brand: BARD-PARKER ® CARBON RIB-BACK BLADES

## (undated) DEVICE — GLV SURG BIOGEL LTX PF 6 1/2

## (undated) DEVICE — NDL HYPO ECLPS SFTY 22G 1 1/2IN

## (undated) DEVICE — MEDI-VAC YANKAUER SUCTION HANDLE W/BULBOUS TIP: Brand: CARDINAL HEALTH

## (undated) DEVICE — ENCORE® LATEX ORTHO SIZE 8, STERILE LATEX POWDER-FREE SURGICAL GLOVE: Brand: ENCORE

## (undated) DEVICE — SPNG LAP 18X18IN LF STRL PK/5

## (undated) DEVICE — BANDAGE,GAUZE,BULKEE II,4.5"X4.1YD,STRL: Brand: MEDLINE

## (undated) DEVICE — BITEBLOCK OMNI BLOC

## (undated) DEVICE — SYR LUERLOK 50ML

## (undated) DEVICE — ANTIBACTERIAL UNDYED BRAIDED (POLYGLACTIN 910), SYNTHETIC ABSORBABLE SUTURE: Brand: COATED VICRYL

## (undated) DEVICE — IRRIGATOR BULB ASEPTO 60CC STRL

## (undated) DEVICE — SUT VIC 3/0 PS2 27IN J427H

## (undated) DEVICE — SINGLE-USE BIOPSY FORCEPS: Brand: RADIAL JAW 4

## (undated) DEVICE — THE TORRENT IRRIGATION SCOPE CONNECTOR IS USED WITH THE TORRENT IRRIGATION TUBING TO PROVIDE IRRIGATION FLUIDS SUCH AS STERILE WATER DURING GASTROINTESTINAL ENDOSCOPIC PROCEDURES WHEN USED IN CONJUNCTION WITH AN IRRIGATION PUMP (OR ELECTROSURGICAL UNIT).: Brand: TORRENT

## (undated) DEVICE — AMD ANTIMICROBIAL GAUZE SPONGES,12 PLY USP TYPE VII, 0.2% POLYHEXAMETHYLENE BIGUANIDE HCI (PHMB): Brand: CURITY

## (undated) DEVICE — BNDG ELAS CO-FLEX SLF ADHR 4IN5YD LF STRL

## (undated) DEVICE — Device: Brand: DEFENDO AIR/WATER/SUCTION AND BIOPSY VALVE

## (undated) DEVICE — SCANLAN® SUTURE BOOT™ INSTRUMENT JAW COVERS - ORIGINAL YELLOW, STANDARD PKG (5 PAIR/CARTRIDGE, 1 CARTRIDGE/PKG): Brand: SCANLAN® SUTURE BOOT™ INSTRUMENT JAW COVERS

## (undated) DEVICE — 3M™ STERI-STRIP™ ANTIMICROBIAL SKIN CLOSURES 1/2 INCH X 4 INCHES 50/CARTON 4 CARTONS/CASE A1847: Brand: 3M™ STERI-STRIP™

## (undated) DEVICE — ELECTRD BLD EDGE/INSUL1P 2.4X5.1MM STRL

## (undated) DEVICE — DRN WND JP RND W TROC SIL 15F 3/16IN

## (undated) DEVICE — SOL NACL 0.9PCT 1000ML

## (undated) DEVICE — DRSNG SURESITE WNDW 4X4.5

## (undated) DEVICE — MARKR SKIN W/RULR AND LBL

## (undated) DEVICE — ELECTRD BLD EXT EDGE/INSUL 6IN

## (undated) DEVICE — VLV 4 NDL/FREE SAFESITE W CAP ASP/INJ

## (undated) DEVICE — SYR LUERLOK 30CC

## (undated) DEVICE — 3M(TM) TEGADERM(TM) TRANSPARENT FILM DRESSING FRAME STYLE 1627: Brand: 3M™ TEGADERM™

## (undated) DEVICE — GLV SURG SENSICARE MICRO PF LF 6.5 STRL

## (undated) DEVICE — SUT PROLN 2/0 CT2 30IN 8411H

## (undated) DEVICE — BNDG ELAS ELITE V/CLOSE 6IN 5YD LF STRL

## (undated) DEVICE — TOTAL TRAY, 16FR 10ML SIL FOLEY, URN: Brand: MEDLINE

## (undated) DEVICE — 3M™ STERI-STRIP™ COMPOUND BENZOIN TINCTURE 40 BAGS/CARTON 4 CARTONS/CASE C1544: Brand: 3M™ STERI-STRIP™

## (undated) DEVICE — SUT VIC 2/0 SH 27IN

## (undated) DEVICE — STRIP CLS WND SUTURESTRIP/PLS 0.5X5IN TP1105

## (undated) DEVICE — NDL SPINE 22G 31/2IN BLK

## (undated) DEVICE — CVR TRANSD CIV FLX TPR 11.9 TO 3.8X61CM

## (undated) DEVICE — PK PROC MINOR TOWER 40

## (undated) DEVICE — SUT MNCRYL PLS ANTIB UD 4/0 PS2 18IN

## (undated) DEVICE — NDL HYPO PRECISIONGLIDE REG 25G 1 1/2

## (undated) DEVICE — JACKSON-PRATT 100CC BULB RESERVOIR: Brand: CARDINAL HEALTH

## (undated) DEVICE — DRAPE,CHEST,FENES,15X10,STERIL: Brand: MEDLINE

## (undated) DEVICE — IMPLANTABLE DEVICE
Type: IMPLANTABLE DEVICE | Status: NON-FUNCTIONAL
Removed: 2019-03-22

## (undated) DEVICE — COVER,LIGHT HANDLE,FLX,2/PK: Brand: MEDLINE INDUSTRIES, INC.

## (undated) DEVICE — CANN NASL CO2 TRULINK W/O2 A/

## (undated) DEVICE — SPNG GZ WOVN 4X4IN 12PLY 10/BX STRL

## (undated) DEVICE — SUT PDS 2/0 CT1 27IN DYED Z339H

## (undated) DEVICE — APPL CHLORAPREP W/TINT 10.5ML PERC STRL

## (undated) DEVICE — SUT VIC 3/0 TIES 18IN J110T

## (undated) DEVICE — STPLR SKIN VISISTAT WD 35CT

## (undated) DEVICE — TUBING, SUCTION, 1/4" X 10', STRAIGHT: Brand: MEDLINE

## (undated) DEVICE — PK UNIV COMPL 40

## (undated) DEVICE — CONTAINER,SPECIMEN,OR STERILE,4OZ: Brand: MEDLINE

## (undated) DEVICE — SYR CONTRL LUERLOK 10CC

## (undated) DEVICE — COVER,MAYO STAND,STERILE: Brand: MEDLINE

## (undated) DEVICE — GLV SURG SENSICARE MICRO PF LF 7 STRL

## (undated) DEVICE — 1010 S-DRAPE TOWEL DRAPE 10/BX: Brand: STERI-DRAPE™

## (undated) DEVICE — 3M™ STERI-STRIP™ REINFORCED ADHESIVE SKIN CLOSURES, R1547, 1/2 IN X 4 IN (12 MM X 100 MM), 6 STRIPS/ENVELOPE: Brand: 3M™ STERI-STRIP™

## (undated) DEVICE — DRP C/ARM 41X74IN

## (undated) DEVICE — SUT VIC 2/0 TIES 18IN J111T

## (undated) DEVICE — LOU MINOR PROCEDURE: Brand: MEDLINE INDUSTRIES, INC.

## (undated) DEVICE — SYR LUERLOK 5CC

## (undated) DEVICE — SOL NACL 0.9PCT 100ML SGL

## (undated) DEVICE — PENCL E/S HNDSWCH ROCKR CB

## (undated) DEVICE — GAUZE,SPONGE,4"X4",16PLY,XRAY,STRL,LF: Brand: MEDLINE

## (undated) DEVICE — SUT VIC 5/0 P3 18IN J493G

## (undated) DEVICE — BIOPATCH™ ANTIMICROBIAL DRESSING WITH CHLORHEXIDINE GLUCONATE IS A HYDROPHILLIC POLYURETHANE ABSORPTIVE FOAM WITH CHLORHEXIDINE GLUCONATE (CHG) WHICH INHIBITS BACTERIAL GROWTH UNDER THE DRESSING. THE DRESSING IS INTENDED TO BE USED TO ABSORB EXUDATE, COVER A WOUND CAUSED BY VASCULAR AND NONVASCULAR PERCUTANEOUS MEDICAL DEVICES DURING SURGERY, AS WELL AS REDUCE LOCAL INFECTION AND COLONIZATION OF MICROORGANISMS.: Brand: BIOPATCH

## (undated) DEVICE — TUBING, SUCTION, 1/4" X 20', STRAIGHT: Brand: MEDLINE INDUSTRIES, INC.

## (undated) DEVICE — DRAPE,REIN 53X77,STERILE: Brand: MEDLINE

## (undated) DEVICE — SUT SILK 2/0 FS BLK 18IN 685G

## (undated) DEVICE — DRN WND JP RND W TROC SIL 10F 1/8IN

## (undated) DEVICE — GOWN,SIRUS,NON REINFRCD,LARGE,SET IN SL: Brand: MEDLINE

## (undated) DEVICE — DECANT BG O JET